# Patient Record
Sex: MALE | Race: BLACK OR AFRICAN AMERICAN | URBAN - METROPOLITAN AREA
[De-identification: names, ages, dates, MRNs, and addresses within clinical notes are randomized per-mention and may not be internally consistent; named-entity substitution may affect disease eponyms.]

---

## 2017-01-03 ENCOUNTER — OFFICE VISIT (OUTPATIENT)
Dept: PRIMARY CARE CLINIC | Age: 47
End: 2017-01-03

## 2017-01-03 VITALS
BODY MASS INDEX: 31.8 KG/M2 | WEIGHT: 228 LBS | HEART RATE: 92 BPM | SYSTOLIC BLOOD PRESSURE: 136 MMHG | DIASTOLIC BLOOD PRESSURE: 86 MMHG | OXYGEN SATURATION: 93 % | TEMPERATURE: 99.5 F

## 2017-01-03 DIAGNOSIS — E11.9 TYPE 2 DIABETES MELLITUS WITHOUT COMPLICATION, WITHOUT LONG-TERM CURRENT USE OF INSULIN (HCC): Chronic | ICD-10-CM

## 2017-01-03 DIAGNOSIS — R03.0 ELEVATED BLOOD PRESSURE (NOT HYPERTENSION): Chronic | ICD-10-CM

## 2017-01-03 DIAGNOSIS — N52.9 ERECTILE DYSFUNCTION, UNSPECIFIED ERECTILE DYSFUNCTION TYPE: ICD-10-CM

## 2017-01-03 DIAGNOSIS — E11.9 TYPE 2 DIABETES MELLITUS WITHOUT COMPLICATION, WITH LONG-TERM CURRENT USE OF INSULIN (HCC): ICD-10-CM

## 2017-01-03 DIAGNOSIS — Z79.4 TYPE 2 DIABETES MELLITUS WITHOUT COMPLICATION, WITH LONG-TERM CURRENT USE OF INSULIN (HCC): ICD-10-CM

## 2017-01-03 LAB
A/G RATIO: 1.5 (ref 1.1–2.2)
ALBUMIN SERPL-MCNC: 4.7 G/DL (ref 3.4–5)
ALP BLD-CCNC: 49 U/L (ref 40–129)
ALT SERPL-CCNC: 61 U/L (ref 10–40)
ANION GAP SERPL CALCULATED.3IONS-SCNC: 21 MMOL/L (ref 3–16)
AST SERPL-CCNC: 28 U/L (ref 15–37)
BASOPHILS ABSOLUTE: 0 K/UL (ref 0–0.2)
BASOPHILS RELATIVE PERCENT: 0.3 %
BILIRUB SERPL-MCNC: <0.2 MG/DL (ref 0–1)
BUN BLDV-MCNC: 12 MG/DL (ref 7–20)
CALCIUM SERPL-MCNC: 9.8 MG/DL (ref 8.3–10.6)
CHLORIDE BLD-SCNC: 99 MMOL/L (ref 99–110)
CHOLESTEROL, TOTAL: 218 MG/DL (ref 0–199)
CO2: 21 MMOL/L (ref 21–32)
CREAT SERPL-MCNC: 0.9 MG/DL (ref 0.9–1.3)
CREATININE URINE: 113 MG/DL (ref 39–259)
EOSINOPHILS ABSOLUTE: 0 K/UL (ref 0–0.6)
EOSINOPHILS RELATIVE PERCENT: 0.5 %
GFR AFRICAN AMERICAN: >60
GFR NON-AFRICAN AMERICAN: >60
GLOBULIN: 3.2 G/DL
GLUCOSE BLD-MCNC: 192 MG/DL (ref 70–99)
HCT VFR BLD CALC: 44.4 % (ref 40.5–52.5)
HDLC SERPL-MCNC: 50 MG/DL (ref 40–60)
HEMOGLOBIN: 14.3 G/DL (ref 13.5–17.5)
LDL CHOLESTEROL CALCULATED: 121 MG/DL
LYMPHOCYTES ABSOLUTE: 2.5 K/UL (ref 1–5.1)
LYMPHOCYTES RELATIVE PERCENT: 35.6 %
MCH RBC QN AUTO: 27 PG (ref 26–34)
MCHC RBC AUTO-ENTMCNC: 32.3 G/DL (ref 31–36)
MCV RBC AUTO: 83.5 FL (ref 80–100)
MICROALBUMIN UR-MCNC: 19.2 MG/DL
MICROALBUMIN/CREAT UR-RTO: 169.9 MG/G (ref 0–30)
MONOCYTES ABSOLUTE: 0.3 K/UL (ref 0–1.3)
MONOCYTES RELATIVE PERCENT: 4.7 %
NEUTROPHILS ABSOLUTE: 4.2 K/UL (ref 1.7–7.7)
NEUTROPHILS RELATIVE PERCENT: 58.9 %
PDW BLD-RTO: 13.8 % (ref 12.4–15.4)
PLATELET # BLD: 311 K/UL (ref 135–450)
PMV BLD AUTO: 8.9 FL (ref 5–10.5)
POTASSIUM SERPL-SCNC: 4.9 MMOL/L (ref 3.5–5.1)
RBC # BLD: 5.31 M/UL (ref 4.2–5.9)
SODIUM BLD-SCNC: 141 MMOL/L (ref 136–145)
TOTAL PROTEIN: 7.9 G/DL (ref 6.4–8.2)
TRIGL SERPL-MCNC: 236 MG/DL (ref 0–150)
TSH SERPL DL<=0.05 MIU/L-ACNC: 2.28 UIU/ML (ref 0.27–4.2)
VLDLC SERPL CALC-MCNC: 47 MG/DL
WBC # BLD: 7.1 K/UL (ref 4–11)

## 2017-01-03 PROCEDURE — 99214 OFFICE O/P EST MOD 30 MIN: CPT | Performed by: INTERNAL MEDICINE

## 2017-01-03 RX ORDER — LOVASTATIN 40 MG/1
40 TABLET ORAL DAILY
Qty: 30 TABLET | Refills: 6 | Status: SHIPPED | OUTPATIENT
Start: 2017-01-03 | End: 2017-09-12 | Stop reason: SDUPTHER

## 2017-01-03 RX ORDER — OLMESARTAN MEDOXOMIL 20 MG/1
20 TABLET ORAL DAILY
Qty: 30 TABLET | Refills: 6 | Status: SHIPPED | OUTPATIENT
Start: 2017-01-03 | End: 2017-09-12 | Stop reason: SDUPTHER

## 2017-01-03 RX ORDER — SILDENAFIL 100 MG/1
100 TABLET, FILM COATED ORAL PRN
Qty: 10 TABLET | Refills: 6 | Status: SHIPPED | OUTPATIENT
Start: 2017-01-03 | End: 2017-07-13 | Stop reason: SDUPTHER

## 2017-01-03 ASSESSMENT — PATIENT HEALTH QUESTIONNAIRE - PHQ9
2. FEELING DOWN, DEPRESSED OR HOPELESS: 0
1. LITTLE INTEREST OR PLEASURE IN DOING THINGS: 0
SUM OF ALL RESPONSES TO PHQ9 QUESTIONS 1 & 2: 0
SUM OF ALL RESPONSES TO PHQ QUESTIONS 1-9: 0

## 2017-01-03 ASSESSMENT — ENCOUNTER SYMPTOMS
EYE ITCHING: 0
BLURRED VISION: 0
EYE DISCHARGE: 0
GASTROINTESTINAL NEGATIVE: 1
EYE PAIN: 0
ORTHOPNEA: 0
SHORTNESS OF BREATH: 0
PHOTOPHOBIA: 0
RESPIRATORY NEGATIVE: 1
EYE REDNESS: 0

## 2017-01-04 LAB
ESTIMATED AVERAGE GLUCOSE: 214.5 MG/DL
HBA1C MFR BLD: 9.1 %

## 2017-03-24 ENCOUNTER — OFFICE VISIT (OUTPATIENT)
Dept: PRIMARY CARE CLINIC | Age: 47
End: 2017-03-24

## 2017-03-24 ENCOUNTER — TELEPHONE (OUTPATIENT)
Dept: SURGERY | Age: 47
End: 2017-03-24

## 2017-03-24 VITALS
BODY MASS INDEX: 31.94 KG/M2 | OXYGEN SATURATION: 100 % | WEIGHT: 229 LBS | TEMPERATURE: 97.9 F | DIASTOLIC BLOOD PRESSURE: 74 MMHG | HEART RATE: 86 BPM | SYSTOLIC BLOOD PRESSURE: 132 MMHG | RESPIRATION RATE: 18 BRPM

## 2017-03-24 DIAGNOSIS — L72.3 SEBACEOUS CYST: Primary | ICD-10-CM

## 2017-03-24 DIAGNOSIS — R03.0 ELEVATED BLOOD PRESSURE (NOT HYPERTENSION): Chronic | ICD-10-CM

## 2017-03-24 DIAGNOSIS — E11.9 TYPE 2 DIABETES MELLITUS WITHOUT COMPLICATION, WITHOUT LONG-TERM CURRENT USE OF INSULIN (HCC): Chronic | ICD-10-CM

## 2017-03-24 DIAGNOSIS — E78.5 HYPERLIPIDEMIA, UNSPECIFIED HYPERLIPIDEMIA TYPE: Chronic | ICD-10-CM

## 2017-03-24 PROCEDURE — 99214 OFFICE O/P EST MOD 30 MIN: CPT | Performed by: INTERNAL MEDICINE

## 2017-03-24 ASSESSMENT — ENCOUNTER SYMPTOMS
SORE THROAT: 0
VOMITING: 0
EYE DISCHARGE: 0
EYE REDNESS: 0
CHANGE IN BOWEL HABIT: 0
ABDOMINAL PAIN: 0
GASTROINTESTINAL NEGATIVE: 1
RESPIRATORY NEGATIVE: 1
EYE ITCHING: 0
COUGH: 0
NAUSEA: 0
SWOLLEN GLANDS: 0
PHOTOPHOBIA: 0
EYE PAIN: 0
VISUAL CHANGE: 0

## 2017-03-24 ASSESSMENT — PATIENT HEALTH QUESTIONNAIRE - PHQ9
2. FEELING DOWN, DEPRESSED OR HOPELESS: 0
SUM OF ALL RESPONSES TO PHQ QUESTIONS 1-9: 0
1. LITTLE INTEREST OR PLEASURE IN DOING THINGS: 0
SUM OF ALL RESPONSES TO PHQ9 QUESTIONS 1 & 2: 0

## 2017-03-28 ENCOUNTER — OFFICE VISIT (OUTPATIENT)
Dept: SURGERY | Age: 47
End: 2017-03-28

## 2017-03-28 VITALS
HEART RATE: 66 BPM | SYSTOLIC BLOOD PRESSURE: 112 MMHG | DIASTOLIC BLOOD PRESSURE: 78 MMHG | BODY MASS INDEX: 32.2 KG/M2 | WEIGHT: 230 LBS | HEIGHT: 71 IN

## 2017-03-28 DIAGNOSIS — L72.3 SEBACEOUS CYST: Primary | ICD-10-CM

## 2017-03-28 PROCEDURE — 11402 EXC TR-EXT B9+MARG 1.1-2 CM: CPT | Performed by: SURGERY

## 2017-07-13 DIAGNOSIS — N52.9 ERECTILE DYSFUNCTION, UNSPECIFIED ERECTILE DYSFUNCTION TYPE: ICD-10-CM

## 2017-07-14 RX ORDER — SILDENAFIL CITRATE 100 MG
TABLET ORAL
Qty: 10 TABLET | Refills: 0 | Status: SHIPPED | OUTPATIENT
Start: 2017-07-14 | End: 2017-08-18 | Stop reason: SDUPTHER

## 2017-08-18 DIAGNOSIS — N52.9 ERECTILE DYSFUNCTION, UNSPECIFIED ERECTILE DYSFUNCTION TYPE: ICD-10-CM

## 2017-08-22 RX ORDER — SILDENAFIL CITRATE 100 MG
TABLET ORAL
Qty: 10 TABLET | Refills: 0 | Status: SHIPPED | OUTPATIENT
Start: 2017-08-22 | End: 2017-09-12 | Stop reason: SDUPTHER

## 2017-09-12 ENCOUNTER — OFFICE VISIT (OUTPATIENT)
Dept: PRIMARY CARE CLINIC | Age: 47
End: 2017-09-12

## 2017-09-12 VITALS
BODY MASS INDEX: 30.8 KG/M2 | HEART RATE: 89 BPM | DIASTOLIC BLOOD PRESSURE: 81 MMHG | RESPIRATION RATE: 18 BRPM | WEIGHT: 220 LBS | SYSTOLIC BLOOD PRESSURE: 115 MMHG | TEMPERATURE: 98.2 F | OXYGEN SATURATION: 95 % | HEIGHT: 71 IN

## 2017-09-12 DIAGNOSIS — E11.9 TYPE 2 DIABETES MELLITUS WITHOUT COMPLICATION, WITHOUT LONG-TERM CURRENT USE OF INSULIN (HCC): Chronic | ICD-10-CM

## 2017-09-12 DIAGNOSIS — E11.9 TYPE 2 DIABETES MELLITUS WITHOUT COMPLICATION, WITH LONG-TERM CURRENT USE OF INSULIN (HCC): ICD-10-CM

## 2017-09-12 DIAGNOSIS — E11.8 TYPE 2 DIABETES MELLITUS WITH COMPLICATION, WITHOUT LONG-TERM CURRENT USE OF INSULIN (HCC): ICD-10-CM

## 2017-09-12 DIAGNOSIS — Z79.4 TYPE 2 DIABETES MELLITUS WITHOUT COMPLICATION, WITH LONG-TERM CURRENT USE OF INSULIN (HCC): ICD-10-CM

## 2017-09-12 DIAGNOSIS — N52.9 ERECTILE DYSFUNCTION, UNSPECIFIED ERECTILE DYSFUNCTION TYPE: ICD-10-CM

## 2017-09-12 LAB
GLUCOSE BLD-MCNC: 226 MG/DL
HBA1C MFR BLD: 14 %

## 2017-09-12 PROCEDURE — 82962 GLUCOSE BLOOD TEST: CPT | Performed by: INTERNAL MEDICINE

## 2017-09-12 PROCEDURE — 83036 HEMOGLOBIN GLYCOSYLATED A1C: CPT | Performed by: INTERNAL MEDICINE

## 2017-09-12 PROCEDURE — 99214 OFFICE O/P EST MOD 30 MIN: CPT | Performed by: INTERNAL MEDICINE

## 2017-09-12 RX ORDER — LOVASTATIN 40 MG/1
40 TABLET ORAL DAILY
Qty: 30 TABLET | Refills: 6 | Status: SHIPPED | OUTPATIENT
Start: 2017-09-12 | End: 2018-09-10

## 2017-09-12 RX ORDER — OLMESARTAN MEDOXOMIL 20 MG/1
20 TABLET ORAL DAILY
Qty: 30 TABLET | Refills: 6 | Status: SHIPPED | OUTPATIENT
Start: 2017-09-12 | End: 2018-10-08 | Stop reason: SDUPTHER

## 2017-09-12 RX ORDER — SILDENAFIL 100 MG/1
TABLET, FILM COATED ORAL
Qty: 10 TABLET | Refills: 5 | Status: SHIPPED | OUTPATIENT
Start: 2017-09-12 | End: 2018-03-27 | Stop reason: SDUPTHER

## 2017-09-12 RX ORDER — LANCETS 30 GAUGE
EACH MISCELLANEOUS
Qty: 50 EACH | Refills: 3 | Status: SHIPPED | OUTPATIENT
Start: 2017-09-12 | End: 2022-05-09

## 2017-09-12 ASSESSMENT — ENCOUNTER SYMPTOMS
PHOTOPHOBIA: 0
RESPIRATORY NEGATIVE: 1
EYE ITCHING: 0
VISUAL CHANGE: 0
EYE PAIN: 0
EYE REDNESS: 0
BLURRED VISION: 0
EYE DISCHARGE: 0
GASTROINTESTINAL NEGATIVE: 1

## 2017-09-26 ENCOUNTER — OFFICE VISIT (OUTPATIENT)
Dept: PRIMARY CARE CLINIC | Age: 47
End: 2017-09-26

## 2017-09-26 VITALS
DIASTOLIC BLOOD PRESSURE: 78 MMHG | TEMPERATURE: 98 F | OXYGEN SATURATION: 97 % | SYSTOLIC BLOOD PRESSURE: 115 MMHG | HEART RATE: 90 BPM | WEIGHT: 224.1 LBS | HEIGHT: 71 IN | BODY MASS INDEX: 31.37 KG/M2

## 2017-09-26 DIAGNOSIS — E11.9 TYPE 2 DIABETES MELLITUS WITHOUT COMPLICATION, WITHOUT LONG-TERM CURRENT USE OF INSULIN (HCC): Primary | Chronic | ICD-10-CM

## 2017-09-26 LAB — GLUCOSE BLD-MCNC: 281 MG/DL

## 2017-09-26 PROCEDURE — 99213 OFFICE O/P EST LOW 20 MIN: CPT | Performed by: INTERNAL MEDICINE

## 2017-09-26 PROCEDURE — 82962 GLUCOSE BLOOD TEST: CPT | Performed by: INTERNAL MEDICINE

## 2017-09-26 ASSESSMENT — ENCOUNTER SYMPTOMS
EYE REDNESS: 0
GASTROINTESTINAL NEGATIVE: 1
VISUAL CHANGE: 0
PHOTOPHOBIA: 0
EYE PAIN: 0
BLURRED VISION: 0
EYE DISCHARGE: 0
EYE ITCHING: 0
RESPIRATORY NEGATIVE: 1

## 2017-10-10 ENCOUNTER — OFFICE VISIT (OUTPATIENT)
Dept: PRIMARY CARE CLINIC | Age: 47
End: 2017-10-10

## 2017-10-10 VITALS
DIASTOLIC BLOOD PRESSURE: 76 MMHG | HEIGHT: 71 IN | BODY MASS INDEX: 31.5 KG/M2 | HEART RATE: 67 BPM | SYSTOLIC BLOOD PRESSURE: 115 MMHG | WEIGHT: 225 LBS

## 2017-10-10 DIAGNOSIS — E11.9 TYPE 2 DIABETES MELLITUS WITHOUT COMPLICATION, WITHOUT LONG-TERM CURRENT USE OF INSULIN (HCC): Chronic | ICD-10-CM

## 2017-10-10 PROCEDURE — 99213 OFFICE O/P EST LOW 20 MIN: CPT | Performed by: INTERNAL MEDICINE

## 2017-10-10 ASSESSMENT — ENCOUNTER SYMPTOMS
EYE PAIN: 0
RESPIRATORY NEGATIVE: 1
VISUAL CHANGE: 0
EYE ITCHING: 0
PHOTOPHOBIA: 0
BLURRED VISION: 0
GASTROINTESTINAL NEGATIVE: 1
EYE DISCHARGE: 0
EYE REDNESS: 0

## 2017-10-10 NOTE — ASSESSMENT & PLAN NOTE
2 week follow up and has had a change in his diet and improving blood sugars. Still is over 200 in the morning before breakfast. Increase the Toujeo to 35 units.

## 2017-10-10 NOTE — PROGRESS NOTES
Maico Mendoza  YOB: 1970    Date of Service:  10/10/2017    Chief Complaint   Patient presents with    Diabetes     Diabetes in under better control on basal insulin (Toujeo) and metformin daily along with an improved diet. Diabetes   He presents for his follow-up diabetic visit. He has type 2 diabetes mellitus. No MedicAlert identification noted. His disease course has been improving. Pertinent negatives for hypoglycemia include no confusion, dizziness, headaches, hunger, mood changes, nervousness/anxiousness, pallor, seizures, sleepiness, speech difficulty, sweats or tremors. Pertinent negatives for diabetes include no blurred vision, no chest pain, no fatigue, no foot ulcerations, no polydipsia, no polyphagia, no polyuria, no visual change, no weakness and no weight loss. There are no hypoglycemic complications. Pertinent negatives for hypoglycemia complications include no blackouts. Symptoms are improving. There are no diabetic complications. Risk factors for coronary artery disease include diabetes mellitus, hypertension, male sex, obesity and stress. Current diabetic treatment includes intensive insulin program and oral agent (monotherapy). He is compliant with treatment all of the time. His weight is increasing steadily. He is following a diabetic diet. Meal planning includes ADA exchanges. He has had a previous visit with a dietitian. He participates in exercise three times a week. His home blood glucose trend is decreasing rapidly. An ACE inhibitor/angiotensin II receptor blocker is being taken. He does not see a podiatrist.Eye exam is current.        Allergies   Allergen Reactions    Food      shrimp     Outpatient Prescriptions Marked as Taking for the 10/10/17 encounter (Office Visit) with Taylor Croft MD   Medication Sig Dispense Refill    Insulin Pen Needle 31G X 8 MM MISC 1 each by Does not apply route daily 100 each 3    sildenafil (VIAGRA) 100 MG tablet TAKE ONE Exam   Constitutional: He is oriented to person, place, and time. He appears well-developed and well-nourished. No distress. HENT:   Head: Normocephalic and atraumatic. Eyes: Conjunctivae and EOM are normal. Pupils are equal, round, and reactive to light. Neck: Normal range of motion. Neck supple. Cardiovascular: Normal rate, regular rhythm and normal heart sounds. Exam reveals no gallop and no friction rub. No murmur heard. Pulmonary/Chest: Effort normal and breath sounds normal.   Musculoskeletal: Normal range of motion. He exhibits no edema, tenderness or deformity. Neurological: He is alert and oriented to person, place, and time. Skin: Skin is warm and dry. He is not diaphoretic. Psychiatric: He has a normal mood and affect. His behavior is normal. Judgment and thought content normal.       Lab Review   Office Visit on 09/26/2017   Component Date Value    Glucose 09/26/2017 281    Office Visit on 09/12/2017   Component Date Value    Glucose 09/12/2017 226     Hemoglobin A1C 09/12/2017 14          Health Maintenance   Topic Date Due    Diabetic retinal exam  01/28/2017    DTaP/Tdap/Td vaccine (1 - Tdap) 03/23/2018 (Originally 1/2/1988)    Flu vaccine (1) 03/23/2018 (Originally 9/1/2017)    HIV screen  03/23/2018 (Originally 1/2/1984)    Pneumococcal med risk (1 of 1 - PPSV23) 09/13/2018 (Originally 1/2/1988)    Diabetic hemoglobin A1C test  12/12/2017    Diabetic microalbuminuria test  01/03/2018    Lipid screen  01/03/2018    Diabetic foot exam  03/24/2018          Assessment/Plan:    Diabetes mellitus ([address]  2 week follow up and has had a change in his diet and improving blood sugars. Still is over 200 in the morning before breakfast. Increase the Toujeo to 35 units. 1. Type 2 diabetes mellitus without complication, without long-term current use of insulin (HCC)    - Insulin Pen Needle 31G X 8 MM MISC; 1 each by Does not apply route daily  Dispense: 100 each;  Refill: 3

## 2017-10-10 NOTE — PATIENT INSTRUCTIONS
Increase the Toujeo to 35 units each day. If your morning blood sugars continue to be over 200, let me know before your next visit in a month and I will increase it to 40 units. Check your blood sugars each day as you are doing and bring in your diary to review on your next visit. Patient Education        Learning About Meal Planning for Diabetes  Why plan your meals? Meal planning can be a key part of managing diabetes. Planning meals and snacks with the right balance of carbohydrate, protein, and fat can help you keep your blood sugar at the target level you set with your doctor. You don't have to eat special foods. You can eat what your family eats, including sweets once in a while. But you do have to pay attention to how often you eat and how much you eat of certain foods. You may want to work with a dietitian or a certified diabetes educator. He or she can give you tips and meal ideas and can answer your questions about meal planning. This health professional can also help you reach a healthy weight if that is one of your goals. What plan is right for you? Your dietitian or diabetes educator may suggest that you start with the plate format or carbohydrate counting. The plate format  The plate format is a simple way to help you manage how you eat. You plan meals by learning how much space each food should take on a plate. Using the plate format helps you spread carbohydrate throughout the day. It can make it easier to keep your blood sugar level within your target range. It also helps you see if you're eating healthy portion sizes. To use the plate format, you put non-starchy vegetables on half your plate. Add meat or meat substitutes on one-quarter of the plate. Put a grain or starchy vegetable (such as brown rice or a potato) on the final quarter of the plate.  You can add a small piece of fruit and some low-fat or fat-free milk or yogurt, depending on your carbohydrate goal for each meal.  Here are [email or web address] and sign in to your [employer] account. Enter W867 in the [facility] box to learn more about \"Learning About Meal Planning for Diabetes. \"     If you do not have an account, please click on the \"Sign Up Now\" link. Current as of: March 13, 2017  Content Version: 11.3  © 1285-7943 [employer]. Care instructions adapted under license by [facility] ([facility]). If you have questions about a medical condition or this instruction, always ask your healthcare professional. [place] [address] any warranty or liability for your use of this information. Patient Education        Learning About Diabetes Food Guidelines  Your Care Instructions  Meal planning is important to manage diabetes. It helps keep your blood sugar at a target level (which you set with your doctor). You don't have to eat special foods. You can eat what your family eats, including sweets once in a while. But you do have to pay attention to how often you eat and how much you eat of certain foods. You may want to work with a dietitian or a certified diabetes educator (CDE) to help you plan meals and snacks. A dietitian or CDE can also help you lose weight if that is one of your goals. What should you know about eating carbs? Managing the amount of carbohydrate (carbs) you eat is an important part of healthy meals when you have diabetes. Carbohydrate is found in many foods. · Learn which foods have carbs. And learn the amounts of carbs in different foods. ¨ Bread, cereal, pasta, and rice have about 15 grams of carbs in a serving. A serving is 1 slice of bread (1 ounce), ½ cup of cooked cereal, or 1/3 cup of cooked pasta or rice. ¨ Fruits have 15 grams of carbs in a serving. A serving is 1 small fresh fruit, such as an apple or orange; ½ of a banana; ½ cup of cooked or canned fruit; ½ cup of fruit juice; 1 cup of melon or raspberries; or 2 tablespoons of dried fruit.   ¨ Milk and no-sugar-added yogurt have 15 grams of carbs in a serving. A serving is 1 cup of milk or 2/3 cup of no-sugar-added yogurt. ¨ Starchy vegetables have 15 grams of carbs in a serving. A serving is ½ cup of mashed potatoes or sweet potato; 1 cup winter squash; ½ of a small baked potato; ½ cup of cooked beans; or ½ cup cooked corn or green peas. · Learn how much carbs to eat each day and at each meal. A dietitian or CDE can teach you how to keep track of the amount of carbs you eat. This is called carbohydrate counting. · If you are not sure how to count carbohydrate grams, use the Plate Method to plan meals. It is a good, quick way to make sure that you have a balanced meal. It also helps you spread carbs throughout the day. ¨ Divide your plate by types of foods. Put non-starchy vegetables on half the plate, meat or other protein food on one-quarter of the plate, and a grain or starchy vegetable in the final quarter of the plate. To this you can add a small piece of fruit and 1 cup of milk or yogurt, depending on how many carbs you are supposed to eat at a meal.  · Try to eat about the same amount of carbs at each meal. Do not \"save up\" your daily allowance of carbs to eat at one meal.  · Proteins have very little or no carbs per serving. Examples of proteins are beef, chicken, turkey, fish, eggs, tofu, cheese, cottage cheese, and peanut butter. A serving size of meat is 3 ounces, which is about the size of a deck of cards. Examples of meat substitute serving sizes (equal to 1 ounce of meat) are 1/4 cup of cottage cheese, 1 egg, 1 tablespoon of peanut butter, and ½ cup of tofu. How can you eat out and still eat healthy? · Learn to estimate the serving sizes of foods that have carbohydrate. If you measure food at home, it will be easier to estimate the amount in a serving of restaurant food.   · If the meal you order has too much carbohydrate (such as potatoes, corn, or baked beans), ask to have a low-carbohydrate Instructions    You don't have to eat special foods when you take insulin. You just have to be careful to eat healthy foods. And you have to spread throughout the day the carbohydrates you eat. Carbohydrates raise blood sugar higher and more quickly than any other nutrient. It is found in desserts, breads and cereals, and fruit. It's also found in starchy vegetables such as potatoes and corn, grains such as rice and pasta, and milk and yogurt. The more carbohydrates, or carbs, you eat at one time, the higher your blood sugar will rise. Spreading carbs throughout the day helps keep your blood sugar levels within your target range. Counting carbs is one of the best ways to keep your blood sugar under control when you use insulin. It helps you match the right amount of insulin to the number of grams of carbohydrates in a meal. You need to test your blood sugar several times a day to learn how carbs affect you. Then you can change your diet and insulin dose as needed. A registered dietitian or certified diabetes educator can help you plan meals and snacks. Follow-up care is a key part of your treatment and safety. Be sure to make and go to all appointments, and call your doctor if you are having problems. It's also a good idea to know your test results and keep a list of the medicines you take. How can you care for yourself at home? Know your daily amount of carbohydrates  Your daily amount depends on several things, including your weight, how active you are, which diabetes medicines you take, and what your goals are for your blood sugar levels. A registered dietitian or certified diabetes educator can help you plan how many carbohydrates to include in each meal and snack. For most adults, a guideline for the daily amount of carbohydrates is:  · 45 to 60 grams at each meal. That's about the same as 3 to 4 carbohydrate servings. · 15 to 20 grams at each snack.  That's about the same as 1 carbohydrate serving. Count carbs  If you take insulin, you need to know how many grams of carbohydrates are in a meal. This lets you know how much rapid-acting insulin to take before you eat. If you use an insulin pump, you get a constant rate of insulin during the day. So the pump must be programmed at meals to give you extra insulin to cover the rise in blood sugar after meals. When you know how many carbohydrates you will eat, you can take the right amount of insulin. Or, if you always use the same amount of insulin, you need to make sure that you eat the same amount of carbs at meals. · Learn your own insulin-to-carbohydrates ratio. You and your diabetes health professional will figure out the ratio. You can do this by testing your blood sugar after meals. For example, you may need a certain amount of insulin for every 15 grams of carbohydrates. · Add up the carbohydrate grams in a meal. Then you can figure out how many units of insulin to take based on your insulin-to-carbohydrates ratio. · Look at labels on packaged foods. This can tell you how many carbohydrates are in a serving. You can also use guides from the American Diabetes Association. · Be aware of portions, or serving sizes. If a package has two servings and you eat the whole package, you need to double the number of grams of carbohydrates listed for one serving. · Protein, fat, and fiber do not raise blood sugar as much as carbs do. If you eat a lot of these nutrients in a meal, your blood sugar will rise more slowly than it would otherwise. · Exercise lowers blood sugar. You can use less insulin than you would if you were not doing exercise. Keep in mind that timing matters. If you exercise within 1 hour after a meal, your body may need less insulin for that meal than it would if you exercised 3 hours after the meal. Test your blood sugar to find out how exercise affects your need for insulin.   Eat from all food groups  · Eat at least three meals a drink.  ¨ Check your blood sugar more often. This is because alcohol can lower your blood sugar too much. This may happen even hours later while you sleep. You may want to eat and adjust your insulin dose when you drink alcohol to prevent severe low blood sugar. ¨ Talk to your doctor. Alcohol may not be recommended when you are taking certain diabetes medicines. Where can you learn more? Go to https://ScanCafepepiceweb.[employer]. org and sign in to your [employer] account. Enter I912 in the [place] box to learn more about \"Counting Carbohydrates When You Take Insulin: Care Instructions. \"     If you do not have an account, please click on the \"Sign Up Now\" link. Current as of: March 13, 2017  Content Version: 11.3  © 9295-7613 [employer]. Care instructions adapted under license by [facility] ([facility]). If you have questions about a medical condition or this instruction, always ask your healthcare professional. [place] [address] any warranty or liability for your use of this information.

## 2017-11-01 ENCOUNTER — TELEPHONE (OUTPATIENT)
Dept: PRIMARY CARE CLINIC | Age: 47
End: 2017-11-01

## 2017-11-01 DIAGNOSIS — E11.9 TYPE 2 DIABETES MELLITUS WITHOUT COMPLICATION, WITHOUT LONG-TERM CURRENT USE OF INSULIN (HCC): Primary | Chronic | ICD-10-CM

## 2017-11-01 RX ORDER — INSULIN GLARGINE 300 U/ML
25 INJECTION, SOLUTION SUBCUTANEOUS NIGHTLY
Qty: 1.5 ML | Refills: 5 | Status: SHIPPED | OUTPATIENT
Start: 2017-11-01 | End: 2018-07-05 | Stop reason: SDUPTHER

## 2017-11-01 NOTE — TELEPHONE ENCOUNTER
Sent to the pharmacy. If there are any problems with this (and there always are) please ask someone else in the office to take care of it [facility] with samples. I am out of town until next week. Thanks.

## 2017-12-06 ENCOUNTER — OFFICE VISIT (OUTPATIENT)
Dept: PRIMARY CARE CLINIC | Age: 47
End: 2017-12-06

## 2017-12-06 VITALS
WEIGHT: 232 LBS | RESPIRATION RATE: 18 BRPM | OXYGEN SATURATION: 96 % | BODY MASS INDEX: 32.36 KG/M2 | TEMPERATURE: 98 F | HEART RATE: 86 BPM | DIASTOLIC BLOOD PRESSURE: 80 MMHG | SYSTOLIC BLOOD PRESSURE: 134 MMHG

## 2017-12-06 DIAGNOSIS — E11.9 TYPE 2 DIABETES MELLITUS WITHOUT COMPLICATION, WITHOUT LONG-TERM CURRENT USE OF INSULIN (HCC): Chronic | ICD-10-CM

## 2017-12-06 LAB — HBA1C MFR BLD: 10.8 %

## 2017-12-06 PROCEDURE — 99213 OFFICE O/P EST LOW 20 MIN: CPT | Performed by: INTERNAL MEDICINE

## 2017-12-06 PROCEDURE — 83036 HEMOGLOBIN GLYCOSYLATED A1C: CPT | Performed by: INTERNAL MEDICINE

## 2017-12-06 ASSESSMENT — ENCOUNTER SYMPTOMS
RESPIRATORY NEGATIVE: 1
EYE DISCHARGE: 0
EYES NEGATIVE: 1

## 2017-12-06 NOTE — PATIENT INSTRUCTIONS
Continue the current insulin dose. Continue metformin. See me in 2 months. Continue your other medications.

## 2017-12-06 NOTE — PROGRESS NOTES
Dexter Mann  YOB: 1970    Date of Service:  12/6/2017    Chief Complaint   Patient presents with    1 Month Follow-Up    Diabetes     Doing well after 2 months on Toujeo and feels a lot better. Diabetes   He presents for his follow-up diabetic visit. He has type 2 diabetes mellitus. No MedicAlert identification noted. His disease course has been improving. There are no hypoglycemic associated symptoms. Pertinent negatives for hypoglycemia include no confusion or nervousness/anxiousness. There are no diabetic associated symptoms. There are no hypoglycemic complications. Symptoms are stable. There are no diabetic complications. Risk factors for coronary artery disease include hypertension, male sex, diabetes mellitus, dyslipidemia and sedentary lifestyle. Current diabetic treatment includes intensive insulin program and oral agent (monotherapy). He is compliant with treatment all of the time. His weight is increasing steadily. He is following a diabetic diet. Meal planning includes ADA exchanges. He has had a previous visit with a dietitian. He participates in exercise three times a week. His home blood glucose trend is decreasing rapidly. His breakfast blood glucose range is generally 180-200 mg/dl. His lunch blood glucose range is generally 140-180 mg/dl. His dinner blood glucose range is generally 140-180 mg/dl. His highest blood glucose is 140-180 mg/dl. His overall blood glucose range is 140-180 mg/dl. An ACE inhibitor/angiotensin II receptor blocker is being taken. He does not see a podiatrist.Eye exam is current.        Allergies   Allergen Reactions    Food      shrimp     Outpatient Prescriptions Marked as Taking for the 12/6/17 encounter (Office Visit) with Adam Robledo MD   Medication Sig Dispense Refill    TOUJEO SOLOSTAR 300 UNIT/ML injection pen Inject 25 Units into the skin nightly 1.5 mL 5    Insulin Pen Needle 31G X 8 MM MISC 1 each by Does not apply route daily 100 each 3    sildenafil (VIAGRA) 100 MG tablet TAKE ONE TABLET BY MOUTH AS NEEDED FOR ERECTILE DYSFUNCTION 10 tablet 5    metFORMIN (GLUCOPHAGE) 500 MG tablet TAKE ONE TABLET BY MOUTH TWICE DAILY WITH MEALS 60 tablet 5    Lancets MISC Test once daily 50 each 3    olmesartan (BENICAR) 20 MG tablet Take 1 tablet by mouth daily 30 tablet 6         There is no immunization history on file for this patient. Past Medical History:   Diagnosis Date    Diabetes mellitus ([address]     ED (erectile dysfunction) 1/31/2013    Hyperlipidemia LDL goal < 100 1/31/2013     No past surgical history on file. Family History   Problem Relation Age of Onset    Diabetes Mother     Diabetes Father     Diabetes Sister     Diabetes Brother        Review of Systems:  Review of Systems   Constitutional: Negative for activity change and appetite change. HENT: Negative. Eyes: Negative. Negative for discharge. Respiratory: Negative. Genitourinary: Negative for difficulty urinating. Musculoskeletal: Negative for arthralgias. Skin: Negative for rash. Neurological: Negative. Psychiatric/Behavioral: Negative. Negative for agitation and confusion. The patient is not nervous/anxious. All other systems reviewed and are negative. Vitals:    12/06/17 1619   BP: 134/80   Site: Left Arm   Position: Sitting   Cuff Size: Large Adult   Pulse: 86   Resp: 18   Temp: 98 °F (36.7 °C)   TempSrc: Oral   SpO2: 96%   Weight: 232 lb (105.2 kg)     Body mass index is 32.36 kg/m². Wt Readings from Last 3 Encounters:   12/06/17 232 lb (105.2 kg)   10/10/17 225 lb (102.1 kg)   09/26/17 224 lb 1.6 oz (101.7 kg)     BP Readings from Last 3 Encounters:   12/06/17 134/80   10/10/17 115/76   09/26/17 115/78         Physical Exam   Constitutional: He is oriented to person, place, and time. He appears well-developed and well-nourished. HENT:   Head: Normocephalic and atraumatic.    Eyes: Conjunctivae and EOM are normal. Pupils are equal, round, and reactive to light. Neck: Normal range of motion. Neck supple. Cardiovascular: Normal rate, regular rhythm and normal heart sounds. Pulmonary/Chest: Effort normal and breath sounds normal.   Musculoskeletal: Normal range of motion. Neurological: He is alert and oriented to person, place, and time. Skin: Skin is warm and dry. Psychiatric: He has a normal mood and affect. His behavior is normal. Thought content normal.       Lab Review   Office Visit on 09/26/2017   Component Date Value    Glucose 09/26/2017 281    Office Visit on 09/12/2017   Component Date Value    Glucose 09/12/2017 226     Hemoglobin A1C 09/12/2017 14          Health Maintenance   Topic Date Due    Diabetic retinal exam  01/28/2017    Diabetic hemoglobin A1C test  12/12/2017    DTaP/Tdap/Td vaccine (1 - Tdap) 03/23/2018 (Originally 1/2/1988)    Flu vaccine (1) 03/23/2018 (Originally 9/1/2017)    HIV screen  03/23/2018 (Originally 1/2/1984)    Pneumococcal med risk (1 of 1 - PPSV23) 09/13/2018 (Originally 1/2/1988)    Diabetic microalbuminuria test  01/03/2018    Lipid screen  01/03/2018    Diabetic foot exam  03/24/2018          Assessment/Plan:    Diabetes mellitus ([address]  The patient is doing well on the 35 untis of [facility]. The diary was reviewed from the last 2 months and his blood sugar control is improving with blood sugars consistently in the mid to high one hundreds. 1. Type 2 diabetes mellitus without complication, without long-term current use of insulin ([address]         No Follow-up on file.

## 2017-12-06 NOTE — ASSESSMENT & PLAN NOTE
The patient is doing well on the 35 untis of Toujeo. The diary was reviewed from the last 2 months and his blood sugar control is improving with blood sugars consistently in the mid to high one hundreds.

## 2018-01-15 LAB — DIABETIC RETINOPATHY: NEGATIVE

## 2018-02-07 ENCOUNTER — OFFICE VISIT (OUTPATIENT)
Dept: PRIMARY CARE CLINIC | Age: 48
End: 2018-02-07

## 2018-02-07 VITALS
SYSTOLIC BLOOD PRESSURE: 118 MMHG | TEMPERATURE: 98.7 F | HEART RATE: 92 BPM | DIASTOLIC BLOOD PRESSURE: 81 MMHG | BODY MASS INDEX: 33.42 KG/M2 | WEIGHT: 239.6 LBS | OXYGEN SATURATION: 95 %

## 2018-02-07 DIAGNOSIS — E11.9 TYPE 2 DIABETES MELLITUS WITHOUT COMPLICATION, WITHOUT LONG-TERM CURRENT USE OF INSULIN (HCC): Primary | Chronic | ICD-10-CM

## 2018-02-07 LAB — HBA1C MFR BLD: 9.3 %

## 2018-02-07 PROCEDURE — 99213 OFFICE O/P EST LOW 20 MIN: CPT | Performed by: INTERNAL MEDICINE

## 2018-02-07 PROCEDURE — 83036 HEMOGLOBIN GLYCOSYLATED A1C: CPT | Performed by: INTERNAL MEDICINE

## 2018-02-07 ASSESSMENT — ENCOUNTER SYMPTOMS
EYE ITCHING: 0
EYE REDNESS: 0
RESPIRATORY NEGATIVE: 1
EYE DISCHARGE: 0
EYE PAIN: 0
PHOTOPHOBIA: 0
EYES NEGATIVE: 1
GASTROINTESTINAL NEGATIVE: 1

## 2018-02-07 NOTE — ASSESSMENT & PLAN NOTE
a1c is slowly coming down on 35 units of insulin. Weight is going up. Not controlling carbohydrates very well. Give diet instruction today in summary. See me in 3months.

## 2018-02-07 NOTE — PATIENT INSTRUCTIONS
up\" your daily allowance of carbs to eat at one meal.  · Proteins have very little or no carbs per serving. Examples of proteins are beef, chicken, turkey, fish, eggs, tofu, cheese, cottage cheese, and peanut butter. A serving size of meat is 3 ounces, which is about the size of a deck of cards. Examples of meat substitute serving sizes (equal to 1 ounce of meat) are 1/4 cup of cottage cheese, 1 egg, 1 tablespoon of peanut butter, and ½ cup of tofu. How can you eat out and still eat healthy? · Learn to estimate the serving sizes of foods that have carbohydrate. If you measure food at home, it will be easier to estimate the amount in a serving of restaurant food. · If the meal you order has too much carbohydrate (such as potatoes, corn, or baked beans), ask to have a low-carbohydrate food instead. Ask for a salad or green vegetables. · If you use insulin, check your blood sugar before and after eating out to help you plan how much to eat in the future. · If you eat more carbohydrate at a meal than you had planned, take a walk or do other exercise. This will help lower your blood sugar. What else should you know? · Limit saturated fat, such as the fat from meat and dairy products. This is a healthy choice because people who have diabetes are at higher risk of heart disease. So choose lean cuts of meat and nonfat or low-fat dairy products. Use olive or canola oil instead of butter or shortening when cooking. · Don't skip meals. Your blood sugar may drop too low if you skip meals and take insulin or certain medicines for diabetes. · Check with your doctor before you drink alcohol. Alcohol can cause your blood sugar to drop too low. Alcohol can also cause a bad reaction if you take certain diabetes medicines. Follow-up care is a key part of your treatment and safety. Be sure to make and go to all appointments, and call your doctor if you are having problems.  It's also a good idea to know your test results and Add to it as you think of more things you need. · Take the list to the store to do your weekly shopping. Follow-up care is a key part of your treatment and safety. Be sure to make and go to all appointments, and call your doctor if you are having problems. It's also a good idea to know your test results and keep a list of the medicines you take. Where can you learn more? Go to [email or web address]://[email or web address] org and sign in to your RiffTrax account. Enter K235 in the Jiglu box to learn more about \"Learning About Meal Planning for Diabetes. \"     If you do not have an account, please click on the \"Sign Up Now\" link. Current as of: March 13, 2017  Content Version: 11.5  © 3343-9010 V[employer]. Care instructions adapted under license by [facility] [place] [address] If you have questions about a medical condition or this instruction, always ask your healthcare professional. [place] [address] any warranty or liability for your use of this information. Patient Education        Counting Carbohydrates: Care Instructions  Your Care Instructions    You don't have to eat special foods when you have diabetes. You just have to be careful to eat healthy foods. Carbohydrates (carbs) raise blood sugar higher and quicker than any other nutrient. Carbs are found in desserts, breads and cereals, and fruit. They're also in starchy vegetables. These include potatoes, corn, and grains such as rice and pasta. Carbs are also in milk and yogurt. The more carbs you eat at one time, the higher your blood sugar will rise. Spreading carbs all through the day helps keep your blood sugar levels within your target range. Counting carbs is one of the best ways to keep your blood sugar under control. If you use insulin, counting carbs helps you match the right amount of insulin to the number of grams of carbs in a meal. Then you can change your diet and insulin dose as needed.  Testing your blood than it would if you exercised 3 hours after the meal. Test your blood sugar to find out how exercise affects your need for insulin. If you do or don't take insulin:  · Look at labels on packaged foods. This can tell you how many carbs are in a serving. You can also use guides from the American Diabetes Association. · Be aware of portions, or serving sizes. If a package has two servings and you eat the whole package, you need to double the number of grams of carbohydrate listed for one serving. · Protein, fat, and fiber do not raise blood sugar as much as carbs do. If you eat a lot of these nutrients in a meal, your blood sugar will rise more slowly than it would otherwise. Eat from all food groups  · Eat at least three meals a day. · Plan meals to include food from all the food groups. The food groups include grains, fruits, dairy, proteins, and vegetables. · Talk to your dietitian or diabetes educator about ways to add limited amounts of sweets into your meal plan. · If you drink alcohol, talk to your doctor. It may not be recommended when you are taking certain diabetes medicines. Where can you learn more? Go to [email or web address]://[email or web address] [email or web address].[email or web address] org and sign in to your Modumetal account. Enter D609 in the [facility] box to learn more about \"Counting Carbohydrates: Care Instructions. \"     If you do not have an account, please click on the \"Sign Up Now\" link. Current as of: March 13, 2017  Content Version: 11.5  © 6842-7037 [employer]. Care instructions adapted under license by [facility] ([facility]). If you have questions about a medical condition or this instruction, always ask your healthcare professional. [place] [address] any warranty or liability for your use of this information. Patient Education        Learning About Insulin Pens  What is an insulin pen? An insulin pen is a device for giving insulin shots. It looks like a pen.  Inside the pen is a needle and a cartridge filled with insulin. You can set the dose of insulin with a dial on the outside of the pen. You use the pen to give the insulin shot (injection). Both disposable and reusable insulin pens are available. With a disposable pen, a set amount of insulin comes in the pen ready to use. When the insulin is used up, you throw the pen away. You use a new pen the next time you need insulin. With a reusable pen, you don't throw the pen away. Instead, you reload the pen with a pre-measured cartridge of insulin. When the insulin is used up, you insert a new cartridge into the pen. Disposable and reusable pens both need a new needle with each shot. The needles come in different lengths and widths. [place] needles will prevent injecting into the muscle, especially in children or people who are lean. Thinner-width needles reduce the pricking sensation. Width is measured by gauge. The higher the number, the thinner the needle. Why do some people prefer pens? · Most people find that insulin pens are easier to use than a bottle and syringe. · Many people feel less pain (or no pain) with the smaller insulin pen needle, compared to a syringe needle. · Insulin pens may help you give yourself more accurate doses. When you draw insulin into a syringe, you must carefully measure so that you don't get too much or too little. But with a pen, you set a dial for the amount of insulin you want, and then you push the button. · Insulin pens may work better than syringes for people who don't see well or who have problems like arthritis that make it harder to use a syringe. · Using an insulin pen draws less attention from others. You can give yourself insulin with fewer people noticing. · You don't need to carry insulin bottles and syringes everywhere you go. An insulin pen fits into a pocket or purse. What should you know about insulin pens? Each pen delivers a different brand and type (or types) of insulin.  Some deliver button and push it in until it stops. Keep the pen in your skin. Hold the dose knob in for 10 seconds (or to the number that the  recommends). Then pull the needle out of your skin. Do not rub the area. 8. Put only the outer cap back over the needle. The thin inner cover is harder to put back on, and you could stick yourself. 9. After covering the needle with the outer cap, unscrew the needle and throw it away in a sharps container or other solid plastic container. You can get a sharps container at your drugstore. 10. Always read the insulin package information that tells the best way to store your insulin pen and insulin cartridges. In general, unopened insulin for pens will last longer if it is kept in the refrigerator. After insulin is opened, most manufacturers say to store it at room temperature. Don't share insulin pens with anyone else who uses insulin. Even when the needle is changed, an insulin pen can carry bacteria or blood that can make another person sick. Where can you learn more? Go to https://ECO-SAFE.[email or web address] org and sign in to your [employer] Technologies account. Enter M910 in the Twist box to learn more about \"Learning About Insulin Pens. \"     If you do not have an account, please click on the \"Sign Up Now\" link. Current as of: March 13, 2017  Content Version: 11.5  © 3027-1647 [employer]CH[employer]OLOG[employer]. Care instructions adapted under license by [facility] ([facility]). If you have questions about a medical condition or this instruction, always ask your healthcare professional. [place] [address] any warranty or liability for your use of this information. Patient Education        Learning About Metformin for Type 2 Diabetes  Introduction    Metformin (such as Glucophage) is a medicine used to treat type 2 diabetes. It helps keep blood sugar levels on target.   You may have tried to eat a healthy diet, lose weight, and get more exercise to keep your blood [email or web address] and sign in to your [employer] account. Enter J360 in the Emos [employer] box to learn more about \"Learning About Metformin for Type 2 Diabetes. \"     If you do not have an account, please click on the \"Sign Up Now\" link. Current as of: March 13, 2017  Content Version: 11.5  © 5737-2736 Healthwise, [employer]. Care instructions adapted under license by [facility] ([facility]). If you have questions about a medical condition or this instruction, always ask your healthcare professional. [place] any warranty or liability for your use of this information.

## 2018-03-21 ENCOUNTER — OFFICE VISIT (OUTPATIENT)
Dept: PRIMARY CARE CLINIC | Age: 48
End: 2018-03-21

## 2018-03-21 VITALS
DIASTOLIC BLOOD PRESSURE: 83 MMHG | BODY MASS INDEX: 33.74 KG/M2 | HEIGHT: 71 IN | TEMPERATURE: 97.8 F | WEIGHT: 241 LBS | SYSTOLIC BLOOD PRESSURE: 119 MMHG | HEART RATE: 78 BPM | OXYGEN SATURATION: 96 %

## 2018-03-21 DIAGNOSIS — L29.9 ITCHING: ICD-10-CM

## 2018-03-21 DIAGNOSIS — L02.91 ABSCESS: ICD-10-CM

## 2018-03-21 PROCEDURE — 99214 OFFICE O/P EST MOD 30 MIN: CPT | Performed by: INTERNAL MEDICINE

## 2018-03-21 ASSESSMENT — ENCOUNTER SYMPTOMS
COUGH: 0
EYE ITCHING: 0
EYE DISCHARGE: 0
PHOTOPHOBIA: 0
RESPIRATORY NEGATIVE: 1
EYE PAIN: 0
GASTROINTESTINAL NEGATIVE: 1
RHINORRHEA: 0
EYE REDNESS: 0

## 2018-03-21 NOTE — PATIENT INSTRUCTIONS
Start on the topical steroid cream up to twice a day as needed to help relieve itching. Continue to change the bandage at least once a day. Continue current medications. See me next month as previously planned.

## 2018-03-27 DIAGNOSIS — E11.9 TYPE 2 DIABETES MELLITUS WITHOUT COMPLICATION, WITHOUT LONG-TERM CURRENT USE OF INSULIN (HCC): Chronic | ICD-10-CM

## 2018-03-27 DIAGNOSIS — N52.9 ERECTILE DYSFUNCTION, UNSPECIFIED ERECTILE DYSFUNCTION TYPE: ICD-10-CM

## 2018-03-28 RX ORDER — SILDENAFIL CITRATE 100 MG
TABLET ORAL
Qty: 10 TABLET | Refills: 5 | Status: SHIPPED | OUTPATIENT
Start: 2018-03-28 | End: 2018-09-24 | Stop reason: SDUPTHER

## 2018-03-28 NOTE — TELEPHONE ENCOUNTER
Requested Prescriptions     Pending Prescriptions Disp Refills    metFORMIN (GLUCOPHAGE) 500 MG tablet [Pharmacy Med Name: METFORMIN 500MG TAB] 60 tablet 5     Sig: TAKE ONE TABLET BY MOUTH TWICE DAILY WITH MEALS    VIAGRA 100 MG tablet [Pharmacy Med Name: SILDENAFIL 100MG TAB] 10 tablet 5     Sig: TAKE ONE TABLET BY MOUTH AS NEEDED FOR ERECTILE DYSFUNCTION     LAst OV 3/21/18

## 2018-05-08 ENCOUNTER — OFFICE VISIT (OUTPATIENT)
Dept: PRIMARY CARE CLINIC | Age: 48
End: 2018-05-08

## 2018-05-08 VITALS
SYSTOLIC BLOOD PRESSURE: 113 MMHG | RESPIRATION RATE: 16 BRPM | OXYGEN SATURATION: 97 % | DIASTOLIC BLOOD PRESSURE: 80 MMHG | HEIGHT: 71 IN | BODY MASS INDEX: 32.62 KG/M2 | TEMPERATURE: 97.5 F | HEART RATE: 78 BPM | WEIGHT: 233 LBS

## 2018-05-08 DIAGNOSIS — R03.0 ELEVATED BLOOD PRESSURE READING WITHOUT DIAGNOSIS OF HYPERTENSION: Chronic | ICD-10-CM

## 2018-05-08 DIAGNOSIS — E11.9 TYPE 2 DIABETES MELLITUS WITHOUT COMPLICATION, WITHOUT LONG-TERM CURRENT USE OF INSULIN (HCC): Primary | Chronic | ICD-10-CM

## 2018-05-08 LAB — HBA1C MFR BLD: 11 %

## 2018-05-08 PROCEDURE — 83036 HEMOGLOBIN GLYCOSYLATED A1C: CPT | Performed by: INTERNAL MEDICINE

## 2018-05-08 PROCEDURE — 99214 OFFICE O/P EST MOD 30 MIN: CPT | Performed by: INTERNAL MEDICINE

## 2018-05-08 ASSESSMENT — ENCOUNTER SYMPTOMS
RESPIRATORY NEGATIVE: 1
BLURRED VISION: 0
EYES NEGATIVE: 1
EYE DISCHARGE: 0

## 2018-07-06 ENCOUNTER — TELEPHONE (OUTPATIENT)
Dept: PRIMARY CARE CLINIC | Age: 48
End: 2018-07-06

## 2018-07-06 NOTE — TELEPHONE ENCOUNTER
Pt is calling because he takes Toujeo, however he will not be able to get another refill until 7/11/18. Do we have any samples available? Pl,s advise. thank you!   Last ov: 5/8/18   Lion: [phone]

## 2018-08-15 ENCOUNTER — OFFICE VISIT (OUTPATIENT)
Dept: PRIMARY CARE CLINIC | Age: 48
End: 2018-08-15

## 2018-08-15 VITALS
RESPIRATION RATE: 18 BRPM | WEIGHT: 237 LBS | HEIGHT: 71 IN | DIASTOLIC BLOOD PRESSURE: 88 MMHG | HEART RATE: 68 BPM | BODY MASS INDEX: 33.18 KG/M2 | OXYGEN SATURATION: 96 % | SYSTOLIC BLOOD PRESSURE: 128 MMHG

## 2018-08-15 DIAGNOSIS — M25.572 ACUTE LEFT ANKLE PAIN: ICD-10-CM

## 2018-08-15 DIAGNOSIS — R03.0 ELEVATED BLOOD PRESSURE READING WITHOUT DIAGNOSIS OF HYPERTENSION: Chronic | ICD-10-CM

## 2018-08-15 DIAGNOSIS — E11.9 TYPE 2 DIABETES MELLITUS WITHOUT COMPLICATION, WITHOUT LONG-TERM CURRENT USE OF INSULIN (HCC): Primary | Chronic | ICD-10-CM

## 2018-08-15 DIAGNOSIS — E11.41 DIABETIC MONONEUROPATHY ASSOCIATED WITH TYPE 2 DIABETES MELLITUS (HCC): ICD-10-CM

## 2018-08-15 LAB — HBA1C MFR BLD: 10.9 %

## 2018-08-15 PROCEDURE — 83036 HEMOGLOBIN GLYCOSYLATED A1C: CPT | Performed by: INTERNAL MEDICINE

## 2018-08-15 PROCEDURE — 99214 OFFICE O/P EST MOD 30 MIN: CPT | Performed by: INTERNAL MEDICINE

## 2018-08-15 RX ORDER — GABAPENTIN 100 MG/1
100 CAPSULE ORAL 3 TIMES DAILY
Qty: 42 CAPSULE | Refills: 0 | Status: SHIPPED | OUTPATIENT
Start: 2018-08-15 | End: 2021-02-15 | Stop reason: ALTCHOICE

## 2018-08-15 RX ORDER — INSULIN GLARGINE 300 U/ML
INJECTION, SOLUTION SUBCUTANEOUS
Qty: 9 PEN | Refills: 5 | Status: SHIPPED | OUTPATIENT
Start: 2018-08-15 | End: 2018-12-24 | Stop reason: SDUPTHER

## 2018-08-15 ASSESSMENT — ENCOUNTER SYMPTOMS
EYE ITCHING: 0
EYES NEGATIVE: 1
RESPIRATORY NEGATIVE: 1
PHOTOPHOBIA: 0
EYE REDNESS: 0
GASTROINTESTINAL NEGATIVE: 1
EYE DISCHARGE: 0
EYE PAIN: 0

## 2018-08-15 ASSESSMENT — PATIENT HEALTH QUESTIONNAIRE - PHQ9
SUM OF ALL RESPONSES TO PHQ QUESTIONS 1-9: 0
2. FEELING DOWN, DEPRESSED OR HOPELESS: 0
SUM OF ALL RESPONSES TO PHQ QUESTIONS 1-9: 0
SUM OF ALL RESPONSES TO PHQ9 QUESTIONS 1 & 2: 0
1. LITTLE INTEREST OR PLEASURE IN DOING THINGS: 0

## 2018-08-15 NOTE — PATIENT INSTRUCTIONS
Start taking 1000 mg of Metformin twice a day with food. See the endocrinologist for further suggestions. Start on Gabapentin for the left leg pain up to 3 times a day. Xray of the left foot and ankle. See me in 3 months.

## 2018-08-15 NOTE — PROGRESS NOTES
Jessica Stauffer  YOB: 1970    Date of Service:  8/15/2018    Chief Complaint   Patient presents with    Diabetes     3 mo follow up, medications taken as prescribed, no concerns    Knee Pain     Lt knee pain, radiates down Lt leg    Leg Pain     associated with knee pain         Diabetes   He presents for his follow-up diabetic visit. He has type 2 diabetes mellitus. No MedicAlert identification noted. His disease course has been stable. There are no hypoglycemic associated symptoms. Associated symptoms include foot paresthesias. Pertinent negatives for diabetes include no fatigue. There are no hypoglycemic complications. Symptoms are worsening. There are no diabetic complications. Risk factors for coronary artery disease include hypertension, male sex, dyslipidemia and diabetes mellitus. Knee Pain      Leg Pain      Foot Pain   This is a new problem. The current episode started 1 to 4 weeks ago. The problem occurs intermittently. The problem has been unchanged. Pertinent negatives include no chills, diaphoresis, fatigue or fever. The symptoms are aggravated by standing (The pain in his left leg in mainly in the lateral left ankle ). He has tried NSAIDs for the symptoms. The treatment provided moderate relief. Allergies   Allergen Reactions    Food      shrimp     Outpatient Prescriptions Marked as Taking for the 8/15/18 encounter (Office Visit) with William Nye MD   Medication Sig Dispense Refill    TOUJEO SOLOSTAR 300 UNIT/ML injection pen INJECT 40 UNITS SUBCUTANEOUSLY AT NIGHT AS DIRECTED 9 pen 5    metFORMIN (GLUCOPHAGE) 1000 MG tablet Take 1 tablet by mouth 2 times daily (with meals) 60 tablet 5    gabapentin (NEURONTIN) 100 MG capsule Take 1 capsule by mouth 3 times daily for 14 days. . 42 capsule 0    Dulaglutide (TRULICITY) 1.5 QR/7.3JN SOPN Inject 1 pen into the skin once a week 4 pen 5    metFORMIN (GLUCOPHAGE) 500 MG tablet TAKE ONE TABLET BY MOUTH TWICE DAILY WITH MEALS 60 tablet 5    VIAGRA 100 MG tablet TAKE ONE TABLET BY MOUTH AS NEEDED FOR ERECTILE DYSFUNCTION 10 tablet 5    hydrocortisone 2.5 % cream Use twice a day as needed to relieve itching. 28 g 1    Insulin Pen Needle 31G X 8 MM MISC 1 each by Does not apply route daily 100 each 3    Lancets MISC Test once daily 50 each 3    olmesartan (BENICAR) 20 MG tablet Take 1 tablet by mouth daily 30 tablet 6         There is no immunization history on file for this patient. Past Medical History:   Diagnosis Date    Diabetes mellitus ([address]     ED (erectile dysfunction) 1/31/2013    Hyperlipidemia LDL goal < 100 1/31/2013     No past surgical history on file. Family History   Problem Relation Age of Onset    Diabetes Mother     Diabetes Father     Diabetes Sister     Diabetes Brother        Review of Systems:  Review of Systems   Constitutional: Negative for activity change, appetite change, chills, diaphoresis, fatigue, fever and unexpected weight change. HENT: Negative. Eyes: Negative. Negative for photophobia, pain, discharge, redness, itching and visual disturbance. Respiratory: Negative. Cardiovascular: Negative. Gastrointestinal: Negative. Genitourinary: Negative. Musculoskeletal: Negative. Skin: Negative. Neurological: Negative. Psychiatric/Behavioral: Negative. All other systems reviewed and are negative. Vitals:    08/15/18 1618   BP: 128/88   Site: Right Arm   Position: Sitting   Cuff Size: Medium Adult   Pulse: 68   Resp: 18   SpO2: 96%   Weight: 237 lb (107.5 kg)   Height: 5' 11\" (1.803 m)     Body mass index is 33.05 kg/m². Wt Readings from Last 3 Encounters:   08/15/18 237 lb (107.5 kg)   05/08/18 233 lb (105.7 kg)   03/21/18 241 lb (109.3 kg)     BP Readings from Last 3 Encounters:   08/15/18 128/88   05/08/18 113/80   03/21/18 119/83         Physical Exam   Constitutional: He is oriented to person, place, and time.  He appears well-developed and well-nourished. HENT:   Head: Normocephalic and atraumatic. Eyes: Pupils are equal, round, and reactive to light. Conjunctivae and EOM are normal.   Neck: Normal range of motion. Neck supple. Cardiovascular: Normal rate, regular rhythm and normal heart sounds. Pulmonary/Chest: Effort normal and breath sounds normal.   Musculoskeletal: Normal range of motion. He exhibits tenderness. The left lateral ankle is slightly tender without any significant swelling   Neurological: He is alert and oriented to person, place, and time. Skin: Skin is warm and dry. Psychiatric: He has a normal mood and affect. His behavior is normal. Judgment and thought content normal.       Lab Review   Office Visit on 05/08/2018   Component Date Value    Hemoglobin A1C 05/08/2018 11.0          Health Maintenance   Topic Date Due    HIV screen  01/02/1984    DTaP/Tdap/Td vaccine (1 - Tdap) 01/02/1988    Diabetic retinal exam  01/28/2017    Diabetic microalbuminuria test  01/03/2018    Lipid screen  01/03/2018    Potassium monitoring  01/03/2018    Creatinine monitoring  01/03/2018    Diabetic foot exam  03/24/2018    A1C test (Diabetic or Prediabetic)  08/08/2018    Pneumococcal med risk (1 of 1 - PPSV23) 09/13/2018 (Originally 1/2/1988)    Flu vaccine (1) 09/01/2018          Assessment/Plan:    Diabetes mellitus ([address]  No progress lowering A1c over the past 3 months. Will try increasing the Metformin dose. Referred to endocrinology for further suggestions. Elevated blood pressure reading without diagnosis of hypertension  Stable           1. Type 2 diabetes mellitus without complication, without long-term current use of insulin ([facility])    - POCT glycosylated hemoglobin (Hb A1C)  - TOUJEO SOLOSTAR 300 UNIT/ML injection pen; INJECT 40 UNITS SUBCUTANEOUSLY AT NIGHT AS DIRECTED  Dispense: 9 pen; Refill: 5  - Malvin Domínguez MD  - metFORMIN (GLUCOPHAGE) 1000 MG tablet;  Take 1 tablet by mouth 2 times daily (with meals)  Dispense: 60 tablet; Refill: 5    2. Diabetic mononeuropathy associated with type 2 diabetes mellitus (HCC)    - gabapentin (NEURONTIN) 100 MG capsule; Take 1 capsule by mouth 3 times daily for 14 days. .  Dispense: 42 capsule; Refill: 0    3. Acute left ankle pain    - XR FOOT LEFT (MIN 3 VIEWS); Future  - XR ANKLE LEFT (2 VIEWS); Future       Return in about 3 months (around 11/15/2018).

## 2018-08-15 NOTE — ASSESSMENT & PLAN NOTE
No progress lowering A1c over the past 3 months. Will try increasing the Metformin dose. Referred to endocrinology for further suggestions.

## 2018-09-10 ENCOUNTER — OFFICE VISIT (OUTPATIENT)
Dept: ENDOCRINOLOGY | Age: 48
End: 2018-09-10

## 2018-09-10 VITALS
OXYGEN SATURATION: 97 % | WEIGHT: 236 LBS | DIASTOLIC BLOOD PRESSURE: 83 MMHG | SYSTOLIC BLOOD PRESSURE: 121 MMHG | HEART RATE: 78 BPM | HEIGHT: 71 IN | BODY MASS INDEX: 33.04 KG/M2

## 2018-09-10 DIAGNOSIS — I10 ESSENTIAL HYPERTENSION: ICD-10-CM

## 2018-09-10 DIAGNOSIS — E78.5 DYSLIPIDEMIA ASSOCIATED WITH TYPE 2 DIABETES MELLITUS (HCC): ICD-10-CM

## 2018-09-10 DIAGNOSIS — E11.69 DYSLIPIDEMIA ASSOCIATED WITH TYPE 2 DIABETES MELLITUS (HCC): ICD-10-CM

## 2018-09-10 PROCEDURE — 99244 OFF/OP CNSLTJ NEW/EST MOD 40: CPT | Performed by: INTERNAL MEDICINE

## 2018-09-10 RX ORDER — ATORVASTATIN CALCIUM 40 MG/1
40 TABLET, FILM COATED ORAL DAILY
Qty: 90 TABLET | Refills: 2 | Status: SHIPPED | OUTPATIENT
Start: 2018-09-10 | End: 2019-11-27 | Stop reason: SDUPTHER

## 2018-09-10 ASSESSMENT — PATIENT HEALTH QUESTIONNAIRE - PHQ9
SUM OF ALL RESPONSES TO PHQ9 QUESTIONS 1 & 2: 0
SUM OF ALL RESPONSES TO PHQ QUESTIONS 1-9: 0
2. FEELING DOWN, DEPRESSED OR HOPELESS: 0
SUM OF ALL RESPONSES TO PHQ QUESTIONS 1-9: 0
1. LITTLE INTEREST OR PLEASURE IN DOING THINGS: 0

## 2018-09-10 NOTE — PROGRESS NOTES
Patient ID:   Nimisha Ansari is a 51 y.o. male    Chief Complaint:   Nimisha Ansari presents for an initial evaluation of Type 2 Diabetes Mellitus , Hyperlipidemia and hypertension. Referred by Dr. 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 Reviewed/Reported  AM: after 130-139  Lunch:  Supper:   HS:     Hypoglycemias: None     Meals Tthree dinner is bigger. Chips 2 bags per day, 90 osbaldo each. No sodas, some juice or regular t ea. Exercise:None      Denies chest pain, exertional dyspnea. Family history of CAD: sister  of CAD at age 61   Denies smoking. SRecommend low dose aspirin     The following portions of the patient's history were reviewed and updated as appropriate:       Family History   Problem Relation Age of Onset    Diabetes Mother     Diabetes Father     Diabetes Sister     Diabetes Brother          Social History     Social History    Marital status: Single     Spouse name: N/A    Number of children: N/A    Years of education: N/A     Occupational History    Not on file. Social History Main Topics    Smoking status: Never Smoker    Smokeless tobacco: Never Used    Alcohol use Yes      Comment: social    Drug use: No    Sexual activity: Yes     Other Topics Concern    Not on file     Social History Narrative    No narrative on file       Past Medical History:   Diagnosis Date    Diabetes mellitus ([address]     ED (erectile dysfunction) 2013    Essential hypertension 9/10/2018    Hyperlipidemia LDL goal < 100 2013       History reviewed. No pertinent surgical history.       Allergies   Allergen Reactions    Food      shrimp         Current Outpatient Prescriptions:    atorvastatin (LIPITOR) 40 MG tablet, Take 1 tablet by mouth daily, Disp: 90 tablet, Rfl: 2    TOUJEO SOLOSTAR 300 UNIT/ML injection pen, INJECT 40 UNITS SUBCUTANEOUSLY AT NIGHT AS DIRECTED (Patient taking differently: INJECT 30 UNITS SUBCUTANEOUSLY AT NIGHT AS DIRECTED), Disp: 9 pen, Rfl: 5    metFORMIN (GLUCOPHAGE) 1000 MG tablet, Take 1 tablet by mouth 2 times daily (with meals), Disp: 60 tablet, Rfl: 5    VIAGRA 100 MG tablet, TAKE ONE TABLET BY MOUTH AS NEEDED FOR ERECTILE DYSFUNCTION, Disp: 10 tablet, Rfl: 5    Insulin Pen Needle 31G X 8 MM MISC, 1 each by Does not apply route daily, Disp: 100 each, Rfl: 3    Lancets MISC, Test once daily, Disp: 50 each, Rfl: 3    olmesartan (BENICAR) 20 MG tablet, Take 1 tablet by mouth daily, Disp: 30 tablet, Rfl: 6    gabapentin (NEURONTIN) 100 MG capsule, Take 1 capsule by mouth 3 times daily for 14 days. ., Disp: 42 capsule, Rfl: 0      Review of Systems:    Constitutional: Negative for fever, chills, and unexpected weight change. HENT: Negative for congestion, ear pain, rhinorrhea,  sore throat and trouble swallowing. Eyes: Negative for photophobia, redness, itching. Respiratory: Negative for cough, shortness of breath and sputum. Cardiovascular: Negative for chest pain, palpitations and leg swelling. Gastrointestinal: Negative for nausea, vomiting, abdominal pain, diarrhea, constipation. Endocrine: Negative for cold intolerance, heat intolerance, polydipsia, polyphagia and polyuria. Genitourinary: Negative for dysuria, urgency, frequency, hematuria and flank pain. Musculoskeletal: Negative for myalgias, back pain, arthralgias and neck pain. Skin/Nail: Negative for rash, itching. Normal nails. Neurological: Negative for seizures, weakness, light-headedness, numbness and headaches. Hematological/ Lymph nodes: Negative for adenopathy. Does not bruise/bleed easily.    Psychiatric/Behavioral: Negative for suicidal ideas, depression, anxiety, sleep disturbance and decreased concentration. Objective:   Physical Exam:  /83 (Site: Left Upper Arm, Position: Sitting, Cuff Size: Large Adult)   Pulse 78   Ht 5' 11\" (1.803 m)   Wt 236 lb (107 kg)   SpO2 97%   BMI 32.92 kg/m²   Constitutional: Patient is oriented to person, place, and time. Patient appears well-developed and well-nourished. HENT:    Head: Normocephalic and atraumatic. Eyes: Conjunctivae and EOM are normal. Pupils are equal, round, and reactive to light. Neck: Normal range of motion. Cardiovascular: Normal rate, regular rhythm and normal heart sounds. Pulmonary/Chest: Effort normal and breath sounds normal.   Abdominal: Soft. Bowel sounds are normal.   Musculoskeletal: Normal range of motion. Neurological: Patient is alert and oriented to person, place, and time. Patient has normal reflexes. Skin: Skin is warm and dry. Psychiatric: Patient has a normal mood and affect. Patient behavior is normal.     Lab Review:    Office Visit on 09/10/2018   Component Date Value Ref Range Status    Diabetic Retinopathy 01/15/2018 Negative   Final   Office Visit on 08/15/2018   Component Date Value Ref Range Status    Hemoglobin A1C 08/15/2018 10.9  % Final   Office Visit on 05/08/2018   Component Date Value Ref Range Status    Hemoglobin A1C 05/08/2018 11.0  % Final   Office Visit on 02/07/2018   Component Date Value Ref Range Status    Hemoglobin A1C 02/07/2018 9.3  % Final   Office Visit on 12/06/2017   Component Date Value Ref Range Status    Hemoglobin A1C 12/06/2017 10.8  % Final   Office Visit on 09/26/2017   Component Date Value Ref Range Status    Glucose 09/26/2017 281  mg/dL Final   Office Visit on 09/12/2017   Component Date Value Ref Range Status    Glucose 09/12/2017 226  mg/dL Final    Hemoglobin A1C 09/12/2017 14  % Final           Assessment and Plan     Jefferson Schaumann was seen today for consultation and diabetes.     Diagnoses and all orders for this visit:    Uncontrolled type 2 diabetes mellitus with microalbuminuria, with long-term current use of insulin (HCC)  -     atorvastatin (LIPITOR) 40 MG tablet; Take 1 tablet by mouth daily    Dyslipidemia associated with type 2 diabetes mellitus (HCC)  -     atorvastatin (LIPITOR) 40 MG tablet; Take 1 tablet by mouth daily    Essential hypertension          1: Type 2 DM complicated with nephropathy   Uncontrolled A1C 10.9% Aug 15th 2018     Need to work on diet and juices, snacks     Metformin 1000MG BID   Toujeo 30 units daily at 2-4am      Will consider adding DPP Iv inhibitor     All instructions provided in written. Check Blood sugars 2-3 times per day. Log them along with insulin and send them every 2 weeks. Call for blood sugars less than 60 or more than 400. Eye exam: Last exam in Jan 2018, denies DR. Conchita Quezada Foot exam: Due March 2018   Deformity/amputation: absent  Skin lesions/pre-ulcerative calluses: absent  Edema: right- negative, left- negative  Sensory exam: Monofilament sensation: normal  Pulses: normal, Vibration (128 Hz): Intact    Renal screen: MACR 169.9 Jan 2017     TSH screen: Jan 2017     2: HTN   Controlled     3: Hyperlipidemia   LDL: 93 , HDL: 48 , TGs: 140 June 2016   Atorvastatin 40mg daily     RTC in 4 weeks with logs and then in 4-6 weeks with A1C, lipids, MACR, UA       EDUCATION:   Greater than 50% of this follow-up visit was spent in general counseling regarding   obesity, diet, exercise, importance of adherence to insulin regime, recognition and treatment of hypo and hyperglycemia,  glucose logging, proper diabetes management, diabetic complications with poor management and the importance of glycemic control in order to avoid the complications of diabetes. Risks and potential complications of diabetes were reviewed with the patient. Diabetes health maintenance plan and follow-up were discussed and understood by the patient.   We reviewed the importance of medication compliance and

## 2018-09-24 DIAGNOSIS — N52.9 ERECTILE DYSFUNCTION, UNSPECIFIED ERECTILE DYSFUNCTION TYPE: ICD-10-CM

## 2018-09-24 RX ORDER — SILDENAFIL 100 MG/1
100 TABLET, FILM COATED ORAL PRN
Qty: 10 TABLET | Refills: 1 | Status: SHIPPED | OUTPATIENT
Start: 2018-09-24 | End: 2018-12-22 | Stop reason: SDUPTHER

## 2018-09-26 ENCOUNTER — TELEPHONE (OUTPATIENT)
Dept: ENDOCRINOLOGY | Age: 48
End: 2018-09-26

## 2018-09-26 NOTE — TELEPHONE ENCOUNTER
Called Mr. Jesús Leal to discuss BS log. Had to leave a message to contact the office to discuss BS log:    No change keep medications the same.

## 2018-10-08 ENCOUNTER — OFFICE VISIT (OUTPATIENT)
Dept: ENDOCRINOLOGY | Age: 48
End: 2018-10-08
Payer: COMMERCIAL

## 2018-10-08 VITALS
HEART RATE: 60 BPM | OXYGEN SATURATION: 98 % | WEIGHT: 232 LBS | HEIGHT: 71 IN | BODY MASS INDEX: 32.48 KG/M2 | SYSTOLIC BLOOD PRESSURE: 124 MMHG | DIASTOLIC BLOOD PRESSURE: 88 MMHG

## 2018-10-08 DIAGNOSIS — E11.69 DYSLIPIDEMIA ASSOCIATED WITH TYPE 2 DIABETES MELLITUS (HCC): ICD-10-CM

## 2018-10-08 DIAGNOSIS — I10 ESSENTIAL HYPERTENSION: ICD-10-CM

## 2018-10-08 DIAGNOSIS — E78.5 DYSLIPIDEMIA ASSOCIATED WITH TYPE 2 DIABETES MELLITUS (HCC): ICD-10-CM

## 2018-10-08 DIAGNOSIS — E11.9 TYPE 2 DIABETES MELLITUS WITHOUT COMPLICATION, WITH LONG-TERM CURRENT USE OF INSULIN (HCC): ICD-10-CM

## 2018-10-08 DIAGNOSIS — Z79.4 TYPE 2 DIABETES MELLITUS WITHOUT COMPLICATION, WITH LONG-TERM CURRENT USE OF INSULIN (HCC): ICD-10-CM

## 2018-10-08 PROCEDURE — 99214 OFFICE O/P EST MOD 30 MIN: CPT | Performed by: INTERNAL MEDICINE

## 2018-10-08 RX ORDER — OLMESARTAN MEDOXOMIL 20 MG/1
20 TABLET ORAL DAILY
Qty: 30 TABLET | Refills: 6 | Status: SHIPPED | OUTPATIENT
Start: 2018-10-08 | End: 2018-12-24 | Stop reason: SDUPTHER

## 2018-10-08 NOTE — PROGRESS NOTES
Objective:   Physical Exam:  /88 (Site: Left Upper Arm, Position: Sitting, Cuff Size: Large Adult)   Pulse 60   Ht 5' 11\" (1.803 m)   Wt 232 lb (105.2 kg)   SpO2 98%   BMI 32.36 kg/m²   Constitutional: Patient is oriented to person, place, and time. Patient appears well-developed and well-nourished. HENT:    Head: Normocephalic and atraumatic. Eyes: Conjunctivae and EOM are normal. Pupils are equal, round, and reactive to light. Neck: Normal range of motion. Cardiovascular: Normal rate, regular rhythm and normal heart sounds. Pulmonary/Chest: Effort normal and breath sounds normal.   Abdominal: Soft. Bowel sounds are normal.   Musculoskeletal: Normal range of motion. Neurological: Patient is alert and oriented to person, place, and time. Patient has normal reflexes. Skin: Skin is warm and dry. Psychiatric: Patient has a normal mood and affect. Patient behavior is normal.     Lab Review:    Office Visit on 09/10/2018   Component Date Value Ref Range Status    Diabetic Retinopathy 01/15/2018 Negative   Final   Office Visit on 08/15/2018   Component Date Value Ref Range Status    Hemoglobin A1C 08/15/2018 10.9  % Final   Office Visit on 05/08/2018   Component Date Value Ref Range Status    Hemoglobin A1C 05/08/2018 11.0  % Final   Office Visit on 02/07/2018   Component Date Value Ref Range Status    Hemoglobin A1C 02/07/2018 9.3  % Final   Office Visit on 12/06/2017   Component Date Value Ref Range Status    Hemoglobin A1C 12/06/2017 10.8  % Final           Assessment and Plan     Jud Salcido was seen today for diabetes. Diagnoses and all orders for this visit:    Uncontrolled type 2 diabetes mellitus with microalbuminuria, with long-term current use of insulin (HCC)  -     Hemoglobin A1C; Future    Dyslipidemia associated with type 2 diabetes mellitus (HCC)  -     Lipid, Fasting; Future    Essential hypertension  -     Comprehensive Metabolic Panel;  Future  -

## 2018-11-19 ENCOUNTER — OFFICE VISIT (OUTPATIENT)
Dept: ENDOCRINOLOGY | Age: 48
End: 2018-11-19
Payer: COMMERCIAL

## 2018-11-19 VITALS
HEIGHT: 71 IN | BODY MASS INDEX: 32.62 KG/M2 | WEIGHT: 233 LBS | SYSTOLIC BLOOD PRESSURE: 118 MMHG | DIASTOLIC BLOOD PRESSURE: 72 MMHG | HEART RATE: 68 BPM | OXYGEN SATURATION: 98 %

## 2018-11-19 DIAGNOSIS — E11.69 DYSLIPIDEMIA ASSOCIATED WITH TYPE 2 DIABETES MELLITUS (HCC): ICD-10-CM

## 2018-11-19 DIAGNOSIS — I10 ESSENTIAL HYPERTENSION: ICD-10-CM

## 2018-11-19 DIAGNOSIS — E78.5 DYSLIPIDEMIA ASSOCIATED WITH TYPE 2 DIABETES MELLITUS (HCC): ICD-10-CM

## 2018-11-19 DIAGNOSIS — E66.01 MORBID OBESITY DUE TO EXCESS CALORIES (HCC): ICD-10-CM

## 2018-11-19 PROBLEM — L02.91 ABSCESS: Status: RESOLVED | Noted: 2018-03-21 | Resolved: 2018-11-19

## 2018-11-19 LAB
A/G RATIO: 1.6 (ref 1.1–2.2)
ALBUMIN SERPL-MCNC: 4.6 G/DL (ref 3.4–5)
ALP BLD-CCNC: 42 U/L (ref 40–129)
ALT SERPL-CCNC: 39 U/L (ref 10–40)
ANION GAP SERPL CALCULATED.3IONS-SCNC: 18 MMOL/L (ref 3–16)
AST SERPL-CCNC: 21 U/L (ref 15–37)
BILIRUB SERPL-MCNC: <0.2 MG/DL (ref 0–1)
BILIRUBIN URINE: NEGATIVE
BLOOD, URINE: NEGATIVE
BUN BLDV-MCNC: 13 MG/DL (ref 7–20)
CALCIUM SERPL-MCNC: 9.8 MG/DL (ref 8.3–10.6)
CHLORIDE BLD-SCNC: 109 MMOL/L (ref 99–110)
CHOLESTEROL, FASTING: 137 MG/DL (ref 0–199)
CLARITY: CLEAR
CO2: 23 MMOL/L (ref 21–32)
COLOR: YELLOW
CREAT SERPL-MCNC: 0.8 MG/DL (ref 0.9–1.3)
CREATININE URINE: 92.3 MG/DL (ref 39–259)
GFR AFRICAN AMERICAN: >60
GFR NON-AFRICAN AMERICAN: >60
GLOBULIN: 2.9 G/DL
GLUCOSE BLD-MCNC: 121 MG/DL (ref 70–99)
GLUCOSE URINE: NEGATIVE MG/DL
HDLC SERPL-MCNC: 42 MG/DL (ref 40–60)
KETONES, URINE: NEGATIVE MG/DL
LDL CHOLESTEROL CALCULATED: 69 MG/DL
LEUKOCYTE ESTERASE, URINE: NEGATIVE
MICROALBUMIN UR-MCNC: <1.2 MG/DL
MICROALBUMIN/CREAT UR-RTO: NORMAL MG/G (ref 0–30)
MICROSCOPIC EXAMINATION: NORMAL
NITRITE, URINE: NEGATIVE
PH UA: 5
POTASSIUM SERPL-SCNC: 4.6 MMOL/L (ref 3.5–5.1)
PROTEIN UA: NEGATIVE MG/DL
SODIUM BLD-SCNC: 150 MMOL/L (ref 136–145)
SPECIFIC GRAVITY UA: 1.01
TOTAL PROTEIN: 7.5 G/DL (ref 6.4–8.2)
TRIGLYCERIDE, FASTING: 128 MG/DL (ref 0–150)
URINE REFLEX TO CULTURE: NORMAL
URINE TYPE: NORMAL
UROBILINOGEN, URINE: 0.2 E.U./DL
VLDLC SERPL CALC-MCNC: 26 MG/DL

## 2018-11-19 PROCEDURE — 99214 OFFICE O/P EST MOD 30 MIN: CPT | Performed by: INTERNAL MEDICINE

## 2018-11-19 NOTE — PATIENT INSTRUCTIONS
rapid-acting insulin to take before you eat. If you use an insulin pump, you get a constant rate of insulin during the day. So the pump must be programmed at meals to give you extra insulin to cover the rise in blood sugar after meals. When you know how much carbohydrate you will eat, you can take the right amount of insulin. Or, if you always use the same amount of insulin, you need to make sure that you eat the same amount of carbohydrate at meals. If you need more help to understand carbohydrate counting and food labels, ask your doctor, dietitian, or diabetes educator. How do you get started with meal planning? Here are some tips to get started:  · Plan your meals a week at a time. Don't forget to include snacks too. · Use cookbooks or online recipes to plan several main meals. Plan some quick meals for busy nights. You also can double some recipes that freeze well. Then you can save half for other busy nights when you don't have time to cook. · Make sure you have the ingredients you need for your recipes. If you're running low on basic items, put these items on your shopping list too. · List foods that you use to make breakfasts, lunches, and snacks. List plenty of fruits and vegetables. · Post this list on the refrigerator. Add to it as you think of more things you need. · Take the list to the store to do your weekly shopping. Follow-up care is a key part of your treatment and safety. Be sure to make and go to all appointments, and call your doctor if you are having problems. It's also a good idea to know your test results and keep a list of the medicines you take. Where can you learn more? Go to [email or web address]://[email or web address] org and sign in to your [employer] account. Enter Z711 in the [employer] box to learn more about \"Learning About Meal Planning for Diabetes. \"     If you do not have an account, please click on the \"Sign Up Now\" link.   Current as of: December 7, 2017  Content Version: 11.8  © 1403-0243 [employer]. Care instructions adapted under license by [facility] ([facility]). If you have questions about a medical condition or this instruction, always ask your healthcare professional. [address] any warranty or liability for your use of this information.

## 2018-11-20 ENCOUNTER — TELEPHONE (OUTPATIENT)
Dept: ENDOCRINOLOGY | Age: 48
End: 2018-11-20

## 2018-11-20 LAB
ESTIMATED AVERAGE GLUCOSE: 203 MG/DL
HBA1C MFR BLD: 8.7 %

## 2018-11-26 ENCOUNTER — TELEPHONE (OUTPATIENT)
Dept: ENDOCRINOLOGY | Age: 48
End: 2018-11-26

## 2018-11-26 NOTE — TELEPHONE ENCOUNTER
Mr. Spencer Lynn informed  NATALIO [facility] has reviewed BS log and explained numbers look good! Keep working on diet and exercise.

## 2018-12-22 DIAGNOSIS — N52.9 ERECTILE DYSFUNCTION, UNSPECIFIED ERECTILE DYSFUNCTION TYPE: ICD-10-CM

## 2018-12-24 DIAGNOSIS — Z79.4 TYPE 2 DIABETES MELLITUS WITHOUT COMPLICATION, WITH LONG-TERM CURRENT USE OF INSULIN (HCC): ICD-10-CM

## 2018-12-24 DIAGNOSIS — E11.9 TYPE 2 DIABETES MELLITUS WITHOUT COMPLICATION, WITHOUT LONG-TERM CURRENT USE OF INSULIN (HCC): Chronic | ICD-10-CM

## 2018-12-24 DIAGNOSIS — N52.9 ERECTILE DYSFUNCTION, UNSPECIFIED ERECTILE DYSFUNCTION TYPE: ICD-10-CM

## 2018-12-24 DIAGNOSIS — E11.9 TYPE 2 DIABETES MELLITUS WITHOUT COMPLICATION, WITH LONG-TERM CURRENT USE OF INSULIN (HCC): ICD-10-CM

## 2018-12-24 RX ORDER — INSULIN GLARGINE 300 U/ML
INJECTION, SOLUTION SUBCUTANEOUS
Qty: 9 PEN | Refills: 5 | Status: SHIPPED | OUTPATIENT
Start: 2018-12-24 | End: 2019-05-13 | Stop reason: SDUPTHER

## 2018-12-24 RX ORDER — SILDENAFIL 100 MG/1
100 TABLET, FILM COATED ORAL PRN
Qty: 10 TABLET | Refills: 1 | Status: SHIPPED | OUTPATIENT
Start: 2018-12-24 | End: 2018-12-24 | Stop reason: SDUPTHER

## 2018-12-24 RX ORDER — OLMESARTAN MEDOXOMIL 20 MG/1
20 TABLET ORAL DAILY
Qty: 30 TABLET | Refills: 6 | Status: SHIPPED | OUTPATIENT
Start: 2018-12-24 | End: 2019-02-18

## 2018-12-24 RX ORDER — SILDENAFIL 100 MG/1
100 TABLET, FILM COATED ORAL PRN
Qty: 10 TABLET | Refills: 1 | Status: SHIPPED | OUTPATIENT
Start: 2018-12-24 | End: 2019-04-14 | Stop reason: SDUPTHER

## 2019-02-09 LAB
ESTIMATED AVERAGE GLUCOSE: 211.6 MG/DL
HBA1C MFR BLD: 9 %

## 2019-02-18 ENCOUNTER — OFFICE VISIT (OUTPATIENT)
Dept: ENDOCRINOLOGY | Age: 49
End: 2019-02-18
Payer: COMMERCIAL

## 2019-02-18 VITALS
HEIGHT: 71 IN | BODY MASS INDEX: 32.68 KG/M2 | SYSTOLIC BLOOD PRESSURE: 126 MMHG | WEIGHT: 233.4 LBS | DIASTOLIC BLOOD PRESSURE: 87 MMHG | HEART RATE: 86 BPM | OXYGEN SATURATION: 97 %

## 2019-02-18 DIAGNOSIS — E11.69 DYSLIPIDEMIA ASSOCIATED WITH TYPE 2 DIABETES MELLITUS (HCC): ICD-10-CM

## 2019-02-18 DIAGNOSIS — E66.01 MORBID OBESITY DUE TO EXCESS CALORIES (HCC): ICD-10-CM

## 2019-02-18 DIAGNOSIS — E78.5 DYSLIPIDEMIA ASSOCIATED WITH TYPE 2 DIABETES MELLITUS (HCC): ICD-10-CM

## 2019-02-18 PROCEDURE — 99214 OFFICE O/P EST MOD 30 MIN: CPT | Performed by: INTERNAL MEDICINE

## 2019-02-18 RX ORDER — SILDENAFIL 100 MG/1
TABLET, FILM COATED ORAL
COMMUNITY
Start: 2019-02-17 | End: 2019-04-14 | Stop reason: SDUPTHER

## 2019-02-20 ENCOUNTER — TELEPHONE (OUTPATIENT)
Dept: PRIMARY CARE CLINIC | Age: 49
End: 2019-02-20

## 2019-04-10 DIAGNOSIS — E11.9 TYPE 2 DIABETES MELLITUS WITHOUT COMPLICATION, WITHOUT LONG-TERM CURRENT USE OF INSULIN (HCC): Chronic | ICD-10-CM

## 2019-04-12 DIAGNOSIS — N52.9 ERECTILE DYSFUNCTION, UNSPECIFIED ERECTILE DYSFUNCTION TYPE: ICD-10-CM

## 2019-04-14 RX ORDER — SILDENAFIL 100 MG/1
100 TABLET, FILM COATED ORAL PRN
Qty: 10 TABLET | Refills: 0 | Status: SHIPPED | OUTPATIENT
Start: 2019-04-14 | End: 2019-09-10 | Stop reason: SDUPTHER

## 2019-05-07 LAB
ESTIMATED AVERAGE GLUCOSE: 171.4 MG/DL
HBA1C MFR BLD: 7.6 %

## 2019-05-13 ENCOUNTER — OFFICE VISIT (OUTPATIENT)
Dept: ENDOCRINOLOGY | Age: 49
End: 2019-05-13
Payer: COMMERCIAL

## 2019-05-13 VITALS
SYSTOLIC BLOOD PRESSURE: 114 MMHG | WEIGHT: 223 LBS | HEIGHT: 71 IN | BODY MASS INDEX: 31.22 KG/M2 | OXYGEN SATURATION: 96 % | HEART RATE: 78 BPM | DIASTOLIC BLOOD PRESSURE: 81 MMHG

## 2019-05-13 DIAGNOSIS — E78.5 DYSLIPIDEMIA ASSOCIATED WITH TYPE 2 DIABETES MELLITUS (HCC): ICD-10-CM

## 2019-05-13 DIAGNOSIS — E66.01 MORBID OBESITY DUE TO EXCESS CALORIES (HCC): ICD-10-CM

## 2019-05-13 DIAGNOSIS — E11.69 DYSLIPIDEMIA ASSOCIATED WITH TYPE 2 DIABETES MELLITUS (HCC): ICD-10-CM

## 2019-05-13 DIAGNOSIS — I10 ESSENTIAL HYPERTENSION: ICD-10-CM

## 2019-05-13 PROCEDURE — 99214 OFFICE O/P EST MOD 30 MIN: CPT | Performed by: INTERNAL MEDICINE

## 2019-05-13 RX ORDER — INSULIN GLARGINE 300 U/ML
INJECTION, SOLUTION SUBCUTANEOUS
Qty: 9 PEN | Refills: 1 | Status: SHIPPED | OUTPATIENT
Start: 2019-05-13 | End: 2020-01-13 | Stop reason: SDUPTHER

## 2019-05-13 NOTE — PROGRESS NOTES
Patient ID:   Nedra Sarmiento is a 47 y.o. male    Chief Complaint:   Nedra Sarmiento presents for an evaluation of Type 2 Diabetes Mellitus , Hyperlipidemia and hypertension. Subjective:   Type 2 Diabetes Mellitus diagnosed in   On insulin since 9210   Trulicity caused severe GI side effects   Invokana caused urinary issues      Metformin 1000MG BID   Toujeo 24 units daily at [employer] in [facility]  Working on diet and will start working out     Checks blood sugars 2-3 times per day. Reviewed    AM:   Lunch:   Supper:    HS:     Hypoglycemias: None     Meals Three dinner is bigger. Chips 2 bags per day, 90 osbaldo each. No sodas, some juice or regular tea. Exercise:None      Denies chest pain, exertional dyspnea.      Family history of CAD: sister  of CAD at age 61   Denies smoking   Recommend low dose aspirin     The following portions of the patient's history were reviewed and updated as appropriate:       Family History   Problem Relation Age of Onset    Diabetes Mother     Diabetes Father     Diabetes Sister     Diabetes Brother          Social History     Socioeconomic History    Marital status: Single     Spouse name: Not on file    Number of children: Not on file    Years of education: Not on file    Highest education level: Not on file   Occupational History    Not on file   Social Needs    Financial resource strain: Not on file    Food insecurity:     Worry: Not on file     Inability: Not on file    Transportation needs:     Medical: Not on file     Non-medical: Not on file   Tobacco Use    Smoking status: Never Smoker    Smokeless tobacco: Never Used   Substance and Sexual Activity    Alcohol use: Yes     Comment: social    Drug use: No    Sexual activity: Yes   Lifestyle    Physical activity:     Days per week: Not on file     Minutes per session: Not on file    Stress: Not on file   Relationships    Social connections: Talks on phone: Not on file     Gets together: Not on file     Attends Muslim service: Not on file     Active member of club or organization: Not on file     Attends meetings of clubs or organizations: Not on file     Relationship status: Not on file    Intimate partner violence:     Fear of current or ex partner: Not on file     Emotionally abused: Not on file     Physically abused: Not on file     Forced sexual activity: Not on file   Other Topics Concern    Not on file   Social History Narrative    Not on file       Past Medical History:   Diagnosis Date    Diabetes mellitus ([address]    [facility] ED (erectile dysfunction) 1/31/2013    Essential hypertension 9/10/2018    Hyperlipidemia LDL goal < 100 1/31/2013    Morbid obesity due to excess calories ([address] 11/19/2018       History reviewed. No pertinent surgical history. Allergies   Allergen Reactions    Food      shrimp         Current Outpatient Medications:     TOUJEO SOLOSTAR 300 UNIT/ML injection pen, INJECT 24 UNITS SUBCUTANEOUSLY IN AM, Disp: 9 pen, Rfl: 1    sildenafil (VIAGRA) 100 MG tablet, Take 1 tablet by mouth as needed for Erectile Dysfunction, Disp: 10 tablet, Rfl: 0    metFORMIN (GLUCOPHAGE) 1000 MG tablet, TAKE 1 TABLET BY MOUTH TWICE DAILY WITH MEALS, Disp: 180 tablet, Rfl: 1    aspirin 81 MG tablet, Take 81 mg by mouth daily, Disp: , Rfl:     atorvastatin (LIPITOR) 40 MG tablet, Take 1 tablet by mouth daily, Disp: 90 tablet, Rfl: 2    Insulin Pen Needle 31G X 8 MM MISC, 1 each by Does not apply route daily, Disp: 100 each, Rfl: 3    Lancets MISC, Test once daily, Disp: 50 each, Rfl: 3    gabapentin (NEURONTIN) 100 MG capsule, Take 1 capsule by mouth 3 times daily for 14 days. ., Disp: 42 capsule, Rfl: 0      Review of Systems:    Constitutional: Negative for fever, chills, and unexpected weight change. HENT: Negative for congestion, ear pain, rhinorrhea,  sore throat and trouble swallowing.    Eyes: Negative for photophobia, redness, itching. Respiratory: Negative for cough, shortness of breath and sputum. Cardiovascular: Negative for chest pain, palpitations and leg swelling. Gastrointestinal: Negative for nausea, vomiting, abdominal pain, diarrhea, constipation. Endocrine: Negative for cold intolerance, heat intolerance, polydipsia, polyphagia and polyuria. Genitourinary: Negative for dysuria, urgency, frequency, hematuria and flank pain. Musculoskeletal: Negative for myalgias, back pain, arthralgias and neck pain. Skin/Nail: Negative for rash, itching. Normal nails. Neurological: Negative for seizures, weakness, light-headedness, numbness and headaches. Hematological/ Lymph nodes: Negative for adenopathy. Does not bruise/bleed easily. Psychiatric/Behavioral: Negative for suicidal ideas, depression, anxiety, sleep disturbance and decreased concentration. Objective:   Physical Exam:  /81 (Site: Left Upper Arm, Position: Sitting, Cuff Size: Large Adult)   Pulse 78   Ht 5' 11\" (1.803 m)   Wt 223 lb (101.2 kg)   SpO2 96%   BMI 31.10 kg/m²   Constitutional: Patient is oriented to person, place, and time. Patient appears well-developed and well-nourished. HENT:    Head: Normocephalic and atraumatic. Eyes: Conjunctivae and EOM are normal. Pupils are equal, round, and reactive to light. Neck: Normal range of motion. Cardiovascular: Normal rate, regular rhythm and normal heart sounds. Pulmonary/Chest: Effort normal and breath sounds normal.   Abdominal: Soft. Bowel sounds are normal.   Musculoskeletal: Normal range of motion. Neurological: Patient is alert and oriented to person, place, and time. Patient has normal reflexes. Skin: Skin is warm and dry. Psychiatric: Patient has a normal mood and affect.  Patient behavior is normal.     Lab Review:    Orders Only on 05/06/2019   Component Date Value Ref Range Status    Hemoglobin A1C 05/06/2019 7.6  See comment % Final    eAG 05/06/2019 171.4  mg/dL Final   Orders Only on 02/08/2019   Component Date Value Ref Range Status    Hemoglobin A1C 02/08/2019 9.0  See comment % Final    eAG 02/08/2019 211.6  mg/dL Final   Orders Only on 11/19/2018   Component Date Value Ref Range Status    Hemoglobin A1C 11/19/2018 8.7  See comment % Final    eAG 11/19/2018 203.0  mg/dL Final    Cholesterol, Fasting 11/19/2018 137  0 - 199 mg/dL Final    Triglyceride, Fasting 11/19/2018 128  0 - 150 mg/dL Final    HDL 11/19/2018 42  40 - 60 mg/dL Final    LDL Calculated 11/19/2018 69  <100 mg/dL Final    VLDL Cholesterol Calculated 11/19/2018 26  Not Established mg/dL Final    Sodium 11/19/2018 150* 136 - 145 mmol/L Final    Potassium 11/19/2018 4.6  3.5 - 5.1 mmol/L Final    Chloride 11/19/2018 109  99 - 110 mmol/L Final    CO2 11/19/2018 23  21 - 32 mmol/L Final    Anion Gap 11/19/2018 18* 3 - 16 Final    Glucose 11/19/2018 121* 70 - 99 mg/dL Final    BUN 11/19/2018 13  7 - 20 mg/dL Final    CREATININE 11/19/2018 0.8* 0.9 - 1.3 mg/dL Final    GFR Non- 11/19/2018 >60  >60 Final    GFR  11/19/2018 >60  >60 Final    Calcium 11/19/2018 9.8  8.3 - 10.6 mg/dL Final    Total Protein 11/19/2018 7.5  6.4 - 8.2 g/dL Final    Alb 11/19/2018 4.6  3.4 - 5.0 g/dL Final    Albumin/Globulin Ratio 11/19/2018 1.6  1.1 - 2.2 Final    Total Bilirubin 11/19/2018 <0.2  0.0 - 1.0 mg/dL Final    Alkaline Phosphatase 11/19/2018 42  40 - 129 U/L Final    ALT 11/19/2018 39  10 - 40 U/L Final    AST 11/19/2018 21  15 - 37 U/L Final    Globulin 11/19/2018 2.9  g/dL Final    Microalbumin, Random Urine 11/19/2018 <1.20  <2.0 mg/dL Final    Creatinine, Ur 11/19/2018 92.3  39.0 - 259.0 mg/dL Final    Microalbumin Creatinine Ratio 11/19/2018 see below  0.0 - 30.0 mg/g Final    Color, UA 11/19/2018 YELLOW  Straw/Yellow Final    Clarity, UA 11/19/2018 Clear  Clear Final    Glucose, Ur 11/19/2018 Negative  Negative mg/dL Final   [place] Bilirubin Urine 11/19/2018 Negative  Negative Final    Ketones, Urine 11/19/2018 Negative  Negative mg/dL Final    Specific [place], UA 11/19/2018 1.015  1.005 - 1.030 Final    Blood, Urine 11/19/2018 Negative  Negative Final    pH, UA 11/19/2018 5.0  5.0 - 8.0 Final    Protein, UA 11/19/2018 Negative  Negative mg/dL Final    Urobilinogen, Urine 11/19/2018 0.2  <2.0 E.U./dL Final    Nitrite, Urine 11/19/2018 Negative  Negative Final    Leukocyte Esterase, Urine 11/19/2018 Negative  Negative Final    Microscopic Examination 11/19/2018 Not Indicated   Final    Urine Reflex to Culture 11/19/2018 Not Indicated   Final    Urine Type 11/19/2018 Voided   Final   Office Visit on 09/10/2018   Component Date Value Ref Range Status    Diabetic Retinopathy 01/15/2018 Negative   Final   Office Visit on 08/15/2018   Component Date Value Ref Range Status    Hemoglobin A1C 08/15/2018 10.9  % Final           Assessment and Plan     Rosmery Najera was seen today for diabetes. Diagnoses and all orders for this visit:    Uncontrolled type 2 diabetes mellitus with microalbuminuria, with long-term current use of insulin ([facility])  -     Hemoglobin A1C; Future  -     TOUJEO SOLOSTAR 300 UNIT/ML injection pen; INJECT 24 UNITS SUBCUTANEOUSLY IN AM    Dyslipidemia associated with type 2 diabetes mellitus ([address]    Essential hypertension    Morbid obesity due to excess calories ([address]          1: Type 2 DM complicated with nephropathy   Uncontrolled A1C 7.6% May 2019 << 9% < 8.9 %< 10.9%      Blood sugars improved on diet   Will start doing strength exercises and bicycling   He can cut down insulin to 20 units when start working out    Metformin 1000MG BID   Toujeo 24 units daily at 6am   Discussed Treat to target of  in am. He agreed with plan. All instructions provided in written. Check Blood sugars 2-3 times per day. Log them along with insulin and send them every 2 weeks.  Call for blood sugars less than 60 or more than 400.     Eye exam: Last exam in Jan 2018, denies DR. Lord Gamez Foot exam: Due Aug 2019   Deformity/amputation: absent  Skin lesions/pre-ulcerative calluses: absent  Edema: right- negative, left- negative  Sensory exam: Monofilament sensation: normal  Pulses: normal, Vibration (128 Hz): Intact    Renal screen: Normal Nov 2018  TSH screen: Jan 2017     2: HTN   Controlled with out meds     3: Hyperlipidemia   LDL: 69 , HDL: 42, TGs: 128 Nov 2018    Atorvastatin 40mg daily     4: Morbid obesity   Need to work on diet, exercise and lose weight     RTC in 4 months A1C    EDUCATION:   Greater than 50% of this follow-up visit was spent in general counseling regarding   obesity, diet, exercise, importance of adherence to insulin regime, recognition and treatment of hypo and hyperglycemia,  glucose logging, proper diabetes management, diabetic complications with poor management and the importance of glycemic control in order to avoid the complications of diabetes. Risks and potential complications of diabetes were reviewed with the patient. Diabetes health maintenance plan and follow-up were discussed and understood by the patient. We reviewed the importance of medication compliance and regular follow-up. Aggressive lifestyle modification was encouraged. Exercise Counselling: This patient is a candidate for regular physical exercise. Instructions to perform the following types of exercise:  Swimming or water aerobic exercise  Brisk walking  Playing tennis  Stationary bicycle or elliptical indoor  Low impact aerobic exercise    Instructions given to exercise for the following duration:  30 minutes a day for five-seven days per week.     Following instructions for being active throughout the day in addition to formal exercise:  Walk instead of drive whenever possible  Take the stairs instead of the elevator  Work in the garden  Park to the far end of the parking lot to add more walking steps to destination      Electronically signed by Kait Estrella MD on 5/13/2019 at 12:55 PM

## 2019-05-13 NOTE — PATIENT INSTRUCTIONS

## 2019-05-31 DIAGNOSIS — N52.9 ERECTILE DYSFUNCTION, UNSPECIFIED ERECTILE DYSFUNCTION TYPE: ICD-10-CM

## 2019-06-04 RX ORDER — SILDENAFIL 100 MG/1
100 TABLET, FILM COATED ORAL PRN
Qty: 10 TABLET | Refills: 1 | Status: SHIPPED | OUTPATIENT
Start: 2019-06-04 | End: 2019-09-03 | Stop reason: SDUPTHER

## 2019-09-03 DIAGNOSIS — N52.9 ERECTILE DYSFUNCTION, UNSPECIFIED ERECTILE DYSFUNCTION TYPE: ICD-10-CM

## 2019-09-04 RX ORDER — SILDENAFIL 100 MG/1
100 TABLET, FILM COATED ORAL PRN
Qty: 10 TABLET | Refills: 1 | Status: SHIPPED | OUTPATIENT
Start: 2019-09-04 | End: 2019-11-05 | Stop reason: SDUPTHER

## 2019-09-10 ENCOUNTER — TELEPHONE (OUTPATIENT)
Dept: PRIMARY CARE CLINIC | Age: 49
End: 2019-09-10

## 2019-09-10 DIAGNOSIS — N52.9 ERECTILE DYSFUNCTION, UNSPECIFIED ERECTILE DYSFUNCTION TYPE: ICD-10-CM

## 2019-09-10 LAB
ESTIMATED AVERAGE GLUCOSE: 177.2 MG/DL
HBA1C MFR BLD: 7.8 %

## 2019-09-10 RX ORDER — SILDENAFIL 100 MG/1
100 TABLET, FILM COATED ORAL PRN
Qty: 10 TABLET | Refills: 0 | Status: SHIPPED | OUTPATIENT
Start: 2019-09-10 | End: 2019-09-16

## 2019-09-11 ENCOUNTER — TELEPHONE (OUTPATIENT)
Dept: ENDOCRINOLOGY | Age: 49
End: 2019-09-11

## 2019-09-16 ENCOUNTER — OFFICE VISIT (OUTPATIENT)
Dept: ENDOCRINOLOGY | Age: 49
End: 2019-09-16
Payer: COMMERCIAL

## 2019-09-16 VITALS
BODY MASS INDEX: 31.22 KG/M2 | WEIGHT: 223 LBS | HEART RATE: 86 BPM | DIASTOLIC BLOOD PRESSURE: 82 MMHG | HEIGHT: 71 IN | OXYGEN SATURATION: 98 % | SYSTOLIC BLOOD PRESSURE: 118 MMHG

## 2019-09-16 DIAGNOSIS — E66.01 MORBID OBESITY DUE TO EXCESS CALORIES (HCC): ICD-10-CM

## 2019-09-16 DIAGNOSIS — E11.69 DYSLIPIDEMIA ASSOCIATED WITH TYPE 2 DIABETES MELLITUS (HCC): ICD-10-CM

## 2019-09-16 DIAGNOSIS — I10 ESSENTIAL HYPERTENSION: ICD-10-CM

## 2019-09-16 DIAGNOSIS — E78.5 DYSLIPIDEMIA ASSOCIATED WITH TYPE 2 DIABETES MELLITUS (HCC): ICD-10-CM

## 2019-09-16 PROCEDURE — 99214 OFFICE O/P EST MOD 30 MIN: CPT | Performed by: INTERNAL MEDICINE

## 2019-09-16 NOTE — PROGRESS NOTES
lifestyle modification was encouraged. Exercise Counselling: This patient is a candidate for regular physical exercise. Instructions to perform the following types of exercise:  Swimming or water aerobic exercise  Brisk walking  Playing tennis  Stationary bicycle or elliptical indoor  Low impact aerobic exercise    Instructions given to exercise for the following duration:  30 minutes a day for five-seven days per week.     Following instructions for being active throughout the day in addition to formal exercise:  Walk instead of drive whenever possible  Take the stairs instead of the elevator  Work in the garden  Park to the far end of the parking lot to add more walking steps to destination      Electronically signed by Kristal Melendez MD on 9/16/2019 at 1:04 PM

## 2019-11-05 ENCOUNTER — TELEPHONE (OUTPATIENT)
Dept: PRIMARY CARE CLINIC | Age: 49
End: 2019-11-05

## 2019-11-05 DIAGNOSIS — N52.9 ERECTILE DYSFUNCTION, UNSPECIFIED ERECTILE DYSFUNCTION TYPE: ICD-10-CM

## 2019-11-05 RX ORDER — SILDENAFIL 100 MG/1
100 TABLET, FILM COATED ORAL PRN
Qty: 10 TABLET | Refills: 1 | Status: SHIPPED | OUTPATIENT
Start: 2019-11-05 | End: 2021-10-14

## 2019-11-27 DIAGNOSIS — E11.69 DYSLIPIDEMIA ASSOCIATED WITH TYPE 2 DIABETES MELLITUS (HCC): ICD-10-CM

## 2019-11-27 DIAGNOSIS — E78.5 DYSLIPIDEMIA ASSOCIATED WITH TYPE 2 DIABETES MELLITUS (HCC): ICD-10-CM

## 2019-11-27 RX ORDER — ATORVASTATIN CALCIUM 40 MG/1
TABLET, FILM COATED ORAL
Qty: 90 TABLET | Refills: 2 | Status: SHIPPED | OUTPATIENT
Start: 2019-11-27 | End: 2020-04-13 | Stop reason: SDUPTHER

## 2019-12-16 DIAGNOSIS — E11.9 TYPE 2 DIABETES MELLITUS WITHOUT COMPLICATION, WITHOUT LONG-TERM CURRENT USE OF INSULIN (HCC): Chronic | ICD-10-CM

## 2019-12-30 DIAGNOSIS — N52.9 ERECTILE DYSFUNCTION, UNSPECIFIED ERECTILE DYSFUNCTION TYPE: ICD-10-CM

## 2019-12-30 RX ORDER — SILDENAFIL 100 MG/1
100 TABLET, FILM COATED ORAL PRN
Qty: 10 TABLET | Refills: 1 | OUTPATIENT
Start: 2019-12-30 | End: 2020-01-29

## 2020-01-08 ENCOUNTER — OFFICE VISIT (OUTPATIENT)
Dept: PRIMARY CARE CLINIC | Age: 50
End: 2020-01-08
Payer: COMMERCIAL

## 2020-01-08 VITALS
OXYGEN SATURATION: 98 % | HEART RATE: 72 BPM | WEIGHT: 228 LBS | BODY MASS INDEX: 31.92 KG/M2 | SYSTOLIC BLOOD PRESSURE: 126 MMHG | TEMPERATURE: 98.2 F | DIASTOLIC BLOOD PRESSURE: 88 MMHG | HEIGHT: 71 IN

## 2020-01-08 DIAGNOSIS — E66.01 MORBID OBESITY DUE TO EXCESS CALORIES (HCC): ICD-10-CM

## 2020-01-08 DIAGNOSIS — E11.69 DYSLIPIDEMIA ASSOCIATED WITH TYPE 2 DIABETES MELLITUS (HCC): ICD-10-CM

## 2020-01-08 DIAGNOSIS — E78.5 DYSLIPIDEMIA ASSOCIATED WITH TYPE 2 DIABETES MELLITUS (HCC): ICD-10-CM

## 2020-01-08 DIAGNOSIS — I10 ESSENTIAL HYPERTENSION: ICD-10-CM

## 2020-01-08 DIAGNOSIS — R10.9 LEFT FLANK PAIN: ICD-10-CM

## 2020-01-08 LAB
A/G RATIO: 1.8 (ref 1.1–2.2)
ALBUMIN SERPL-MCNC: 4.8 G/DL (ref 3.4–5)
ALP BLD-CCNC: 45 U/L (ref 40–129)
ALT SERPL-CCNC: 68 U/L (ref 10–40)
ANION GAP SERPL CALCULATED.3IONS-SCNC: 16 MMOL/L (ref 3–16)
AST SERPL-CCNC: 50 U/L (ref 15–37)
BILIRUB SERPL-MCNC: 0.4 MG/DL (ref 0–1)
BILIRUBIN URINE: NEGATIVE
BLOOD, URINE: NEGATIVE
BUN BLDV-MCNC: 8 MG/DL (ref 7–20)
CALCIUM SERPL-MCNC: 9.8 MG/DL (ref 8.3–10.6)
CHLORIDE BLD-SCNC: 102 MMOL/L (ref 99–110)
CHOLESTEROL, FASTING: 108 MG/DL (ref 0–199)
CLARITY: CLEAR
CO2: 24 MMOL/L (ref 21–32)
COLOR: YELLOW
CREAT SERPL-MCNC: 0.7 MG/DL (ref 0.9–1.3)
CREATININE URINE: 156.8 MG/DL (ref 39–259)
EPITHELIAL CELLS, UA: 0 /HPF (ref 0–5)
GFR AFRICAN AMERICAN: >60
GFR NON-AFRICAN AMERICAN: >60
GLOBULIN: 2.7 G/DL
GLUCOSE BLD-MCNC: 158 MG/DL (ref 70–99)
GLUCOSE URINE: NEGATIVE MG/DL
HDLC SERPL-MCNC: 43 MG/DL (ref 40–60)
HYALINE CASTS: 1 /LPF (ref 0–8)
KETONES, URINE: NEGATIVE MG/DL
LDL CHOLESTEROL CALCULATED: 41 MG/DL
LEUKOCYTE ESTERASE, URINE: NEGATIVE
MICROALBUMIN UR-MCNC: 13.9 MG/DL
MICROALBUMIN/CREAT UR-RTO: 88.6 MG/G (ref 0–30)
MICROSCOPIC EXAMINATION: YES
NITRITE, URINE: NEGATIVE
PH UA: 5.5 (ref 5–8)
POTASSIUM SERPL-SCNC: 4.7 MMOL/L (ref 3.5–5.1)
PROTEIN UA: 30 MG/DL
RBC UA: 1 /HPF (ref 0–4)
SODIUM BLD-SCNC: 142 MMOL/L (ref 136–145)
SPECIFIC GRAVITY UA: 1.02 (ref 1–1.03)
TOTAL PROTEIN: 7.5 G/DL (ref 6.4–8.2)
TRIGLYCERIDE, FASTING: 119 MG/DL (ref 0–150)
URINE TYPE: ABNORMAL
UROBILINOGEN, URINE: 0.2 E.U./DL
VLDLC SERPL CALC-MCNC: 24 MG/DL
WBC UA: 1 /HPF (ref 0–5)

## 2020-01-08 PROCEDURE — 99214 OFFICE O/P EST MOD 30 MIN: CPT | Performed by: INTERNAL MEDICINE

## 2020-01-08 RX ORDER — SILDENAFIL 100 MG/1
TABLET, FILM COATED ORAL
Qty: 10 TABLET | Refills: 5 | Status: SHIPPED | OUTPATIENT
Start: 2020-01-08 | End: 2020-06-03 | Stop reason: SDUPTHER

## 2020-01-08 ASSESSMENT — PATIENT HEALTH QUESTIONNAIRE - PHQ9
SUM OF ALL RESPONSES TO PHQ QUESTIONS 1-9: 0
SUM OF ALL RESPONSES TO PHQ QUESTIONS 1-9: 0
2. FEELING DOWN, DEPRESSED OR HOPELESS: 0
SUM OF ALL RESPONSES TO PHQ9 QUESTIONS 1 & 2: 0
1. LITTLE INTEREST OR PLEASURE IN DOING THINGS: 0

## 2020-01-09 ENCOUNTER — TELEPHONE (OUTPATIENT)
Dept: PRIMARY CARE CLINIC | Age: 50
End: 2020-01-09

## 2020-01-09 LAB
ESTIMATED AVERAGE GLUCOSE: 194.4 MG/DL
HBA1C MFR BLD: 8.4 %

## 2020-01-09 ASSESSMENT — ENCOUNTER SYMPTOMS
CONSTIPATION: 1
DIARRHEA: 1
EYES NEGATIVE: 1
EYE DISCHARGE: 0
RESPIRATORY NEGATIVE: 1

## 2020-01-09 NOTE — PROGRESS NOTES
Nickolas Ornelas  YOB: 1970    Date of Service:  1/8/2020    Chief Complaint   Patient presents with    Diabetes   Recent diarrhea but says that he is constipated and has left flank pain. See orders. Treat his diarrhea with OTC meds as ordered and CT of his abdomen to see if he has a renal mass. Linzess if still constipated after OTC diarrhea meds trial.      Diabetes   He presents for his follow-up diabetic visit. He has type 2 diabetes mellitus. No MedicAlert identification noted. His disease course has been stable. There are no hypoglycemic associated symptoms. Pertinent negatives for hypoglycemia include no confusion or nervousness/anxiousness. There are no diabetic associated symptoms. Symptoms are stable. There are no diabetic complications. Risk factors for coronary artery disease include stress. Constipation   This is a new problem. The current episode started in the past 7 days. The problem is unchanged. Stool frequency: unclear, poor historian. The patient is not on a high fiber diet. He does not exercise regularly. Associated symptoms include diarrhea. Pertinent negatives include no difficulty urinating. Risk factors include obesity and stress.        Allergies   Allergen Reactions    Food      shrimp     Outpatient Medications Marked as Taking for the 1/8/20 encounter (Office Visit) with Sayra Varghese MD   Medication Sig Dispense Refill    sildenafil (VIAGRA) 100 MG tablet TAKE ONE TABLET BY MOUTH AS NEEDED FOR ERECTILE DYSFUNCTION 10 tablet 5    linaclotide (LINZESS) 145 MCG capsule Take 1 capsule by mouth every morning (before breakfast) 15 capsule 1    metFORMIN (GLUCOPHAGE) 1000 MG tablet TAKE 1 TABLET BY MOUTH TWICE DAILY WITH MEALS 180 tablet 1    atorvastatin (LIPITOR) 40 MG tablet TAKE 1 TABLET BY MOUTH ONCE DAILY 90 tablet 2    TOUJEO SOLOSTAR 300 UNIT/ML injection pen INJECT 24 UNITS SUBCUTANEOUSLY IN AM (Patient taking differently: INJECT 20 UNITS SUBCUTANEOUSLY IN AM) 9 pen 1    aspirin 81 MG tablet Take 81 mg by mouth daily      Insulin Pen Needle 31G X 8 MM MISC 1 each by Does not apply route daily 100 each 3    Lancets MISC Test once daily 50 each 3         There is no immunization history on file for this patient. Past Medical History:   Diagnosis Date    Diabetes mellitus ([facility]     ED (erectile dysfunction) 1/31/2013    Essential hypertension 9/10/2018    Hyperlipidemia LDL goal < 100 1/31/2013    Morbid obesity due to excess calories ([facility] 11/19/2018     No past surgical history on file. Family History   Problem Relation Age of Onset    Diabetes Mother     Diabetes Father     Diabetes Sister     Diabetes Brother        Review of Systems:  Review of Systems   Constitutional: Negative for activity change and appetite change. HENT: Negative. Eyes: Negative. Negative for discharge. Respiratory: Negative. Gastrointestinal: Positive for constipation and diarrhea. Genitourinary: Negative for difficulty urinating. Musculoskeletal: Negative for arthralgias. Skin: Negative for rash. Neurological: Negative. Psychiatric/Behavioral: Negative. Negative for agitation and confusion. The patient is not nervous/anxious. All other systems reviewed and are negative. Vitals:    01/08/20 1031   BP: 126/88   Pulse: 72   Temp: 98.2 °F (36.8 °C)   TempSrc: Oral   SpO2: 98%   Weight: 228 lb (103.4 kg)   Height: 5' 11\" (1.803 m)     Body mass index is 31.8 kg/m². Wt Readings from Last 3 Encounters:   01/08/20 228 lb (103.4 kg)   09/16/19 223 lb (101.2 kg)   05/13/19 223 lb (101.2 kg)     BP Readings from Last 3 Encounters:   01/08/20 126/88   09/16/19 118/82   05/13/19 114/81         Physical Exam  Constitutional:       Appearance: He is well-developed. HENT:      Head: Normocephalic and atraumatic. Eyes:      Conjunctiva/sclera: Conjunctivae normal.      Pupils: Pupils are equal, round, and reactive to light.    Neck: 15 capsule; Refill: 1  - CT ABDOMEN WO CONTRAST Additional Contrast? Radiologist Recommendation; Future  - CT ABDOMEN PELVIS WO CONTRAST Additional Contrast? Radiologist Recommendation; Future       Return in about 6 weeks (around 2/19/2020).

## 2020-01-13 ENCOUNTER — OFFICE VISIT (OUTPATIENT)
Dept: ENDOCRINOLOGY | Age: 50
End: 2020-01-13
Payer: COMMERCIAL

## 2020-01-13 VITALS
HEIGHT: 71 IN | OXYGEN SATURATION: 98 % | HEART RATE: 87 BPM | RESPIRATION RATE: 18 BRPM | SYSTOLIC BLOOD PRESSURE: 125 MMHG | WEIGHT: 233.2 LBS | BODY MASS INDEX: 32.65 KG/M2 | DIASTOLIC BLOOD PRESSURE: 84 MMHG

## 2020-01-13 PROCEDURE — 99214 OFFICE O/P EST MOD 30 MIN: CPT | Performed by: INTERNAL MEDICINE

## 2020-01-13 RX ORDER — INSULIN GLARGINE 300 U/ML
INJECTION, SOLUTION SUBCUTANEOUS
Qty: 9 PEN | Refills: 1 | Status: SHIPPED | OUTPATIENT
Start: 2020-01-13 | End: 2020-11-09 | Stop reason: SDUPTHER

## 2020-01-13 NOTE — PATIENT INSTRUCTIONS
· Your blood sugar stays outside the level your doctor set for you.     · You have any problems. Where can you learn more? Go to [email or web address] and sign in to your [employer] account. Enter H153 in the Smartfield box to learn more about \"Noninsulin Medicines for Type 2 Diabetes: Care Instructions. \"     If you do not have an account, please click on the \"Sign Up Now\" link. Current as of: April 16, 2019  Content Version: 12.3  © 2766-7675 Healthwise, Incorporated. Care instructions adapted under license by [facility] ([facility]). If you have questions about a medical condition or this instruction, always ask your healthcare professional. [address] any warranty or liability for your use of this information.

## 2020-01-13 NOTE — PROGRESS NOTES
Patient ID:   Kolton Traylor is a 45 y.o. male    Chief Complaint:   Kolton Traylor presents for an evaluation of Type 2 Diabetes Mellitus , Hyperlipidemia and hypertension. Subjective:   Type 2 Diabetes Mellitus diagnosed in   On insulin since 5595   Trulicity caused severe GI side effects   Invokana caused urinary issues      Metformin 1000MG BID   Toujeo 22 units daily at 6am  TTT stopped as he could not follow      Drives  in [facility] [employer], tense with situation at home, in marital dispute     Checks blood sugars 3 times per day. Reviewed    AM: 184-536  Lunch: 127-200  Supper: 118-171  HS:     Hypoglycemias: None     Meals Three dinner is bigger. Chips 2 bags per day, 90 osbaldo each. No sodas, some juice or regular tea. Exercise: Not doing stationary bike. 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      Family history of CAD: sister  of CAD at age 61   Denies smoking   On low dose Aspirin    The following portions of the patient's history were reviewed and updated as appropriate:       Family History   Problem Relation Age of Onset    Diabetes Mother     Diabetes Father     Diabetes Sister     Diabetes Brother          Social History     Socioeconomic History    Marital status: Single     Spouse name: Not on file    Number of children: Not on file    Years of education: Not on file    Highest education level: Not on file   Occupational History    Not on file   Social Needs    Financial resource strain: Not on file    Food insecurity:     Worry: Not on file     Inability: Not on file    Transportation needs:     Medical: Not on file     Non-medical: Not on file   Tobacco Use    Smoking status: Never Smoker    Smokeless tobacco: Never Used   Substance and Sexual Activity    Alcohol use: Yes     Comment: social    Drug use: No    Sexual activity: Yes   Lifestyle    Physical activity:     Days per needed for Erectile Dysfunction, Disp: 10 tablet, Rfl: 1    gabapentin (NEURONTIN) 100 MG capsule, Take 1 capsule by mouth 3 times daily for 14 days. ., Disp: 42 capsule, Rfl: 0      Review of Systems:    Constitutional: Negative for fever, chills, and unexpected weight change. HENT: Negative for congestion, ear pain, rhinorrhea,  sore throat and trouble swallowing. Eyes: Negative for photophobia, redness, itching. Respiratory: Negative for cough, shortness of breath and sputum. Cardiovascular: Negative for chest pain, palpitations and leg swelling. Gastrointestinal: Negative for nausea, vomiting, abdominal pain, diarrhea, constipation. Endocrine: Negative for cold intolerance, heat intolerance, polydipsia, polyphagia and polyuria. Genitourinary: Negative for dysuria, urgency, frequency, hematuria and flank pain. Musculoskeletal: Negative for myalgias, back pain, arthralgias and neck pain. Skin/Nail: Negative for rash, itching. Normal nails. Neurological: Negative for seizures, weakness, light-headedness, numbness and headaches. Hematological/ Lymph nodes: Negative for adenopathy. Does not bruise/bleed easily. Psychiatric/Behavioral: Negative for suicidal ideas, depression, anxiety, sleep disturbance and decreased concentration. Objective:   Physical Exam:  /84 (Site: Left Upper Arm, Position: Sitting, Cuff Size: Medium Adult)   Pulse 87   Resp 18   Ht 5' 11\" (1.803 m)   Wt 233 lb 3.2 oz (105.8 kg)   SpO2 98%   BMI 32.52 kg/m²   Constitutional: Patient is oriented to person, place, and time. Patient appears well-developed and well-nourished. HENT:    Head: Normocephalic and atraumatic. Eyes: Conjunctivae and EOM are normal. Pupils are equal, round, and reactive to light. Neck: Normal range of motion. Cardiovascular: Normal rate, regular rhythm and normal heart sounds. Pulmonary/Chest: Effort normal and breath sounds normal.   Abdominal: Soft.  Bowel sounds Color, UA 01/08/2020 YELLOW  Straw/Yellow Final    Clarity, UA 01/08/2020 Clear  Clear Final    Glucose, Ur 01/08/2020 Negative  Negative mg/dL Final    Bilirubin Urine 01/08/2020 Negative  Negative Final    Ketones, Urine 01/08/2020 Negative  Negative mg/dL Final    Specific [place], UA 01/08/2020 1.017  1.005 - 1.030 Final    Blood, Urine 01/08/2020 Negative  Negative Final    pH, UA 01/08/2020 5.5  5.0 - 8.0 Final    Protein, UA 01/08/2020 30* Negative mg/dL Final    Urobilinogen, Urine 01/08/2020 0.2  <2.0 E.U./dL Final    Nitrite, Urine 01/08/2020 Negative  Negative Final    Leukocyte Esterase, Urine 01/08/2020 Negative  Negative Final    Microscopic Examination 01/08/2020 YES   Final    Urine Type 01/08/2020 Cleancatch   Final    Hyaline Casts, UA 01/08/2020 1  0 - 8 /LPF Final    WBC, UA 01/08/2020 1  0 - 5 /HPF Final    RBC, UA 01/08/2020 1  0 - 4 /HPF Final    Epi Cells 01/08/2020 0  0 - 5 /HPF Final   Orders Only on 09/09/2019   Component Date Value Ref Range Status    Hemoglobin A1C 09/09/2019 7.8  See comment % Final    eAG 09/09/2019 177.2  mg/dL Final   Orders Only on 05/06/2019   Component Date Value Ref Range Status    Hemoglobin A1C 05/06/2019 7.6  See comment % Final    eAG 05/06/2019 171.4  mg/dL Final   Orders Only on 02/08/2019   Component Date Value Ref Range Status    Hemoglobin A1C 02/08/2019 9.0  See comment % Final    eAG 02/08/2019 211.6  mg/dL Final           Assessment and Plan     Negrito Melissa was seen today for diabetes. Diagnoses and all orders for this visit:    Uncontrolled type 2 diabetes mellitus with microalbuminuria, with long-term current use of insulin ([facility])  -     TOUJEO SOLOSTAR 300 UNIT/ML SOPN; INJECT 25 UNITS SUBCUTANEOUSLY IN AM  -     Hemoglobin A1C; Future  -     Comprehensive Metabolic Panel; Future  -     Microalbumin / Creatinine Urine Ratio;  Future    Dyslipidemia associated with type 2 diabetes mellitus ([address]    Morbid obesity due to excess

## 2020-03-24 ENCOUNTER — TELEPHONE (OUTPATIENT)
Dept: PRIMARY CARE CLINIC | Age: 50
End: 2020-03-24

## 2020-04-01 ENCOUNTER — TELEPHONE (OUTPATIENT)
Dept: ENDOCRINOLOGY | Age: 50
End: 2020-04-01

## 2020-04-01 NOTE — TELEPHONE ENCOUNTER
Mr. Betancourt Every advised to  stop milk thistle and increase Toujeo to 27 units daily . Expressed understanding.

## 2020-04-09 LAB
A/G RATIO: 1.6 (ref 1.1–2.2)
ALBUMIN SERPL-MCNC: 4.5 G/DL (ref 3.4–5)
ALP BLD-CCNC: 50 U/L (ref 40–129)
ALT SERPL-CCNC: 52 U/L (ref 10–40)
ANION GAP SERPL CALCULATED.3IONS-SCNC: 12 MMOL/L (ref 3–16)
AST SERPL-CCNC: 20 U/L (ref 15–37)
BILIRUB SERPL-MCNC: <0.2 MG/DL (ref 0–1)
BUN BLDV-MCNC: 15 MG/DL (ref 7–20)
CALCIUM SERPL-MCNC: 9.7 MG/DL (ref 8.3–10.6)
CHLORIDE BLD-SCNC: 101 MMOL/L (ref 99–110)
CO2: 27 MMOL/L (ref 21–32)
CREAT SERPL-MCNC: 0.8 MG/DL (ref 0.9–1.3)
CREATININE URINE: 88.2 MG/DL (ref 39–259)
ESTIMATED AVERAGE GLUCOSE: 254.7 MG/DL
GFR AFRICAN AMERICAN: >60
GFR NON-AFRICAN AMERICAN: >60
GLOBULIN: 2.8 G/DL
GLUCOSE BLD-MCNC: 278 MG/DL (ref 70–99)
HBA1C MFR BLD: 10.5 %
MICROALBUMIN UR-MCNC: 5.7 MG/DL
MICROALBUMIN/CREAT UR-RTO: 64.6 MG/G (ref 0–30)
POTASSIUM SERPL-SCNC: 4.7 MMOL/L (ref 3.5–5.1)
SODIUM BLD-SCNC: 140 MMOL/L (ref 136–145)
TOTAL PROTEIN: 7.3 G/DL (ref 6.4–8.2)

## 2020-04-13 ENCOUNTER — VIRTUAL VISIT (OUTPATIENT)
Dept: ENDOCRINOLOGY | Age: 50
End: 2020-04-13
Payer: COMMERCIAL

## 2020-04-13 PROBLEM — R74.8 ELEVATED LIVER ENZYMES: Status: ACTIVE | Noted: 2020-04-13

## 2020-04-13 PROCEDURE — 99214 OFFICE O/P EST MOD 30 MIN: CPT | Performed by: INTERNAL MEDICINE

## 2020-04-13 RX ORDER — ATORVASTATIN CALCIUM 40 MG/1
TABLET, FILM COATED ORAL
Qty: 90 TABLET | Refills: 3 | Status: SHIPPED | OUTPATIENT
Start: 2020-04-13 | End: 2020-06-16 | Stop reason: SDUPTHER

## 2020-04-13 NOTE — PROGRESS NOTES
 Highest education level: Not on file   Occupational History    Not on file   Social Needs    Financial resource strain: Not on file    Food insecurity     Worry: Not on file     Inability: Not on file    Transportation needs     Medical: Not on file     Non-medical: Not on file   Tobacco Use    Smoking status: Never Smoker    Smokeless tobacco: Never Used   Substance and Sexual Activity    Alcohol use: Yes     Comment: social    Drug use: No    Sexual activity: Yes   Lifestyle    Physical activity     Days per week: Not on file     Minutes per session: Not on file    Stress: Not on file   Relationships    Social connections     Talks on phone: Not on file     Gets together: Not on file     Attends Orthodox service: Not on file     Active member of club or organization: Not on file     Attends meetings of clubs or organizations: Not on file     Relationship status: Not on file    Intimate partner violence     Fear of current or ex partner: Not on file     Emotionally abused: Not on file     Physically abused: Not on file     Forced sexual activity: Not on file   Other Topics Concern    Not on file   Social History Narrative    Not on file       Past Medical History:   Diagnosis Date    Diabetes mellitus ([facility]     ED (erectile dysfunction) 1/31/2013    Essential hypertension 9/10/2018    Hyperlipidemia LDL goal < 100 1/31/2013    Morbid obesity due to excess calories ([facility] 11/19/2018       No past surgical history on file.       Allergies   Allergen Reactions    Food      shrimp         Current Outpatient Medications:     atorvastatin (LIPITOR) 40 MG tablet, TAKE 1 TABLET BY MOUTH ONCE DAILY, Disp: 90 tablet, Rfl: 3    TOUJEO SOLOSTAR 300 UNIT/ML SOPN, INJECT 25 UNITS SUBCUTANEOUSLY IN AM (Patient taking differently: INJECT 27 UNITS SUBCUTANEOUSLY IN AM), Disp: 9 pen, Rfl: 1    sildenafil (VIAGRA) 100 MG tablet, TAKE ONE TABLET BY MOUTH AS NEEDED FOR ERECTILE DYSFUNCTION, Disp: 10 tablet, Intact    Renal screen: 87, Jan 2020 > 63 April 2020   For proteinuria, need to control DM and lose weight. TSH screen: Jan 2017     2: HTN   Controlled with out meds   Add lisinopril for continued proteinuria   Also need to improve diabetes control   He wants to wait for lisinopril until next OV    3: Hyperlipidemia   LDL: 41, HDL: 43, TGs: 119 Jan 2020     Atorvastatin 40mg daily, refilled     4: Morbid obesity   Need to work on diet, exercise and lose weight     5: Elevated LFTs   Improving   Follows with pcp     RTC in 3 months A1C, MACR     EDUCATION:   Greater than 50% of this follow-up visit was spent in general counseling regarding   obesity, diet, exercise, importance of adherence to insulin regime, recognition and treatment of hypo and hyperglycemia,  glucose logging, proper diabetes management, diabetic complications with poor management and the importance of glycemic control in order to avoid the complications of diabetes. Risks and potential complications of diabetes were reviewed with the patient. Diabetes health maintenance plan and follow-up were discussed and understood by the patient. We reviewed the importance of medication compliance and regular follow-up. Aggressive lifestyle modification was encouraged. Exercise Counselling: This patient is a candidate for regular physical exercise. Instructions to perform the following types of exercise:  Swimming or water aerobic exercise  Brisk walking  Playing tennis  Stationary bicycle or elliptical indoor  Low impact aerobic exercise    Instructions given to exercise for the following duration:  30 minutes a day for five-seven days per week.     Following instructions for being active throughout the day in addition to formal exercise:  Walk instead of drive whenever possible  Take the stairs instead of the elevator  Work in the garden  Park to the far end of the parking lot to add more walking steps to destination      Electronically signed

## 2020-04-15 ENCOUNTER — TELEPHONE (OUTPATIENT)
Dept: ENDOCRINOLOGY | Age: 50
End: 2020-04-15

## 2020-05-12 ENCOUNTER — TELEPHONE (OUTPATIENT)
Dept: ENDOCRINOLOGY | Age: 50
End: 2020-05-12

## 2020-06-03 RX ORDER — SILDENAFIL 100 MG/1
TABLET, FILM COATED ORAL
Qty: 10 TABLET | Refills: 5 | Status: SHIPPED | OUTPATIENT
Start: 2020-06-03 | Stop reason: SDUPTHER

## 2020-06-11 ENCOUNTER — TELEPHONE (OUTPATIENT)
Dept: PRIMARY CARE CLINIC | Age: 50
End: 2020-06-11

## 2020-06-16 ENCOUNTER — VIRTUAL VISIT (OUTPATIENT)
Dept: PRIMARY CARE CLINIC | Age: 50
End: 2020-06-16
Payer: COMMERCIAL

## 2020-06-16 PROCEDURE — 99214 OFFICE O/P EST MOD 30 MIN: CPT | Performed by: INTERNAL MEDICINE

## 2020-06-16 RX ORDER — ATORVASTATIN CALCIUM 40 MG/1
TABLET, FILM COATED ORAL
Qty: 90 TABLET | Refills: 3 | Status: SHIPPED | OUTPATIENT
Start: 2020-06-16 | End: 2020-08-03 | Stop reason: SDUPTHER

## 2020-07-10 ENCOUNTER — OFFICE VISIT (OUTPATIENT)
Dept: PRIMARY CARE CLINIC | Age: 50
End: 2020-07-10
Payer: COMMERCIAL

## 2020-07-10 PROCEDURE — 99211 OFF/OP EST MAY X REQ PHY/QHP: CPT | Performed by: NURSE PRACTITIONER

## 2020-07-10 NOTE — PATIENT INSTRUCTIONS

## 2020-07-16 ENCOUNTER — TELEPHONE (OUTPATIENT)
Dept: PRIMARY CARE CLINIC | Age: 50
End: 2020-07-16

## 2020-07-16 LAB
SARS-COV-2: NOT DETECTED
SOURCE: NORMAL

## 2020-08-03 ENCOUNTER — OFFICE VISIT (OUTPATIENT)
Dept: ENDOCRINOLOGY | Age: 50
End: 2020-08-03
Payer: COMMERCIAL

## 2020-08-03 VITALS
HEIGHT: 71 IN | HEART RATE: 84 BPM | WEIGHT: 230.4 LBS | BODY MASS INDEX: 32.26 KG/M2 | DIASTOLIC BLOOD PRESSURE: 89 MMHG | TEMPERATURE: 98.2 F | OXYGEN SATURATION: 97 % | SYSTOLIC BLOOD PRESSURE: 133 MMHG

## 2020-08-03 PROCEDURE — 99214 OFFICE O/P EST MOD 30 MIN: CPT | Performed by: INTERNAL MEDICINE

## 2020-08-03 RX ORDER — ATORVASTATIN CALCIUM 40 MG/1
TABLET, FILM COATED ORAL
Qty: 90 TABLET | Refills: 3 | Status: SHIPPED | OUTPATIENT
Start: 2020-08-03 | End: 2020-11-09 | Stop reason: SDUPTHER

## 2020-08-03 NOTE — PATIENT INSTRUCTIONS
Patient Education        Diabetes and Preventing Falls: Care Instructions  Your Care Instructions     If you are an older adult who has diabetes, you may have a higher risk of falling. Complications of diabetes--such as nerve damage, foot problems, and reduced vision--may increase your risk of a fall. Some of your medicines also may add to your risk. By making your home safer, you can lower your risk of falling. Doing things to prevent diabetes complications may also help to lower your risk. You can make your home safer with a few simple measures. Follow-up care is a key part of your treatment and safety. Be sure to make and go to all appointments, and call your doctor if you are having problems. It's also a good idea to know your test results and keep a list of the medicines you take. How can you care for yourself at home? Taking care of yourself  · Keep your blood sugar at a target level (which you set with your doctor). · Exercise regularly to improve your strength, muscle tone, and balance. Walk if you can. Swimming may be a good choice if you cannot walk easily. · Have your vision checked as often as your doctor recommends. It is usually once a year or more often if you have eye problems. · Know the side effects of the medicines you take. Ask your doctor or pharmacist whether the medicines you take can affect your balance. Sleeping pills or sedatives can affect your balance. · Limit the amount of alcohol you drink. Alcohol can impair your balance and other senses. · Have your doctor check your feet during each visit. If you have a foot problem, see your doctor. Preventing falls at home  · Remove raised doorway thresholds, throw rugs, and clutter. Repair loose carpet or raised areas in the floor. · Move furniture and electrical cords to keep them out of walking paths. · Use nonskid floor wax, and wipe up spills right away, especially on ceramic tile floors.   · If you use a walker or cane, put rubber tips on it. If you use crutches, clean the bottoms of them regularly with an abrasive pad, such as steel wool. · Keep your house well lit, especially [facility], and outside walkways. Use night-lights in areas such as hallways and bathrooms. Add extra light switches or use remote switches (such as switches that go on or off when you clap your hands) to make it easier to turn lights on if you have to get up during the night. · Install sturdy handrails on stairways. Put grab bars near your shower, bathtub, and toilet. · Store household items on low shelves so that you do not have to climb or reach high. Or use a reaching device that you can get at a medical supply store. If you have to climb for something, use a step stool with handrails, or ask someone to get it for you. · Keep a cordless phone and a flashlight with new batteries by your bed. If possible, put a phone in each of the main rooms of your house, or carry a cell phone in case you fall and cannot reach a phone. Or you can wear a device around your neck or wrist. You push a button that sends a signal for help. · Wear low-heeled shoes that fit well and give your feet good support. Use footwear with nonskid soles. Check the heels and soles of your shoes for wear. Repair or replace worn heels or soles. · Do not wear socks without shoes on wood floors. · Walk on the grass when the sidewalks are slippery. If you live in an area that gets snow and ice in the winter, sprinkle salt on slippery steps and sidewalks. Where can you learn more? Go to https://FreePriceAlertspeInvestorio.de.Storybyte. org and sign in to your [employer] account. Enter I933 in the [facility] box to learn more about \"Diabetes and Preventing Falls: Care Instructions. \"     If you do not have an account, please click on the \"Sign Up Now\" link. Current as of: August 7, 2019               Content Version: 12.5  © 6351-1942 Healthwise, Incorporated.    Care instructions adapted under license by [facility] ([facility]). If you have questions about a medical condition or this instruction, always ask your healthcare professional. [address] any warranty or liability for your use of this information.

## 2020-08-03 NOTE — PROGRESS NOTES
Patient ID:   Robbin Ingram is a 45 y.o. male    Chief Complaint:   Robbin Ingram presents for an evaluation of Type 2 Diabetes Mellitus , Hyperlipidemia and hypertension. Subjective:   Type 2 Diabetes Mellitus diagnosed in   On insulin since 4114   Trulicity caused severe GI side effects   Invokana caused urinary issues      Metformin 1000MG BID. Missing some even doses   Toujeo 30 units daily at 6am  TTT stopped as he could not follow      Checks blood sugars 3 times per day. Reportedly    AM:   Lunch:   Supper:   HS:     Hypoglycemias: None     Meals Three dinner is bigger. Chips 2 bags per day, 90 osbaldo each. No sodas, some juice or regular tea. Exercise: Walking some. Not doing stationary bike. 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      Family history of CAD: sister  of CAD at age 61   Denies smoking   On low dose Aspirin    The following portions of the patient's history were reviewed and updated as appropriate:       Family History   Problem Relation Age of Onset    Diabetes Mother     Diabetes Father     Diabetes Sister     Diabetes Brother          Social History     Socioeconomic History    Marital status: Single     Spouse name: Not on file    Number of children: Not on file    Years of education: Not on file    Highest education level: Not on file   Occupational History    Not on file   Social Needs    Financial resource strain: Not on file    Food insecurity     Worry: Not on file     Inability: Not on file    Transportation needs     Medical: Not on file     Non-medical: Not on file   Tobacco Use    Smoking status: Never Smoker    Smokeless tobacco: Never Used   Substance and Sexual Activity    Alcohol use: Yes     Comment: social    Drug use: No    Sexual activity: Yes   Lifestyle    Physical activity     Days per week: Not on file     Minutes per session: Not on file    Stress: Not on file Relationships    Social connections     Talks on phone: Not on file     Gets together: Not on file     Attends Spiritism service: Not on file     Active member of club or organization: Not on file     Attends meetings of clubs or organizations: Not on file     Relationship status: Not on file    Intimate partner violence     Fear of current or ex partner: Not on file     Emotionally abused: Not on file     Physically abused: Not on file     Forced sexual activity: Not on file   Other Topics Concern    Not on file   Social History Narrative    Not on file       Past Medical History:   Diagnosis Date    Diabetes mellitus ([facility]    [facility] ED (erectile dysfunction) 1/31/2013    Essential hypertension 9/10/2018    Hyperlipidemia LDL goal < 100 1/31/2013    Morbid obesity due to excess calories ([facility] 11/19/2018       History reviewed. No pertinent surgical history. Allergies   Allergen Reactions    Food      shrimp         Current Outpatient Medications:     atorvastatin (LIPITOR) 40 MG tablet, TAKE 1 TABLET BY MOUTH ONCE DAILY, Disp: 90 tablet, Rfl: 3    sildenafil (VIAGRA) 100 MG tablet, TAKE ONE TABLET BY MOUTH AS NEEDED FOR ERECTILE DYSFUNCTION, Disp: 10 tablet, Rfl: 5    metFORMIN (GLUCOPHAGE) 1000 MG tablet, TAKE 1 TABLET BY MOUTH TWICE DAILY WITH MEALS, Disp: 180 tablet, Rfl: 0    TOUJEO SOLOSTAR 300 UNIT/ML SOPN, INJECT 25 UNITS SUBCUTANEOUSLY IN AM (Patient taking differently: INJECT 27 UNITS SUBCUTANEOUSLY IN AM), Disp: 9 pen, Rfl: 1    aspirin 81 MG tablet, Take 81 mg by mouth daily, Disp: , Rfl:     Insulin Pen Needle 31G X 8 MM MISC, 1 each by Does not apply route daily, Disp: 100 each, Rfl: 3    Lancets MISC, Test once daily, Disp: 50 each, Rfl: 3    sildenafil (VIAGRA) 100 MG tablet, Take 1 tablet by mouth as needed for Erectile Dysfunction, Disp: 10 tablet, Rfl: 1    gabapentin (NEURONTIN) 100 MG capsule, Take 1 capsule by mouth 3 times daily for 14 days. ., Disp: 42 capsule, Rfl: Skin is warm and dry. Psychiatric: Patient has a normal mood and affect.  Patient behavior is normal.     Lab Review:    Orders Only on 07/29/2020   Component Date Value Ref Range Status    Microalbumin, Random Urine 07/29/2020 4.60* <2.0 mg/dL Final    Creatinine, Ur 07/29/2020 107.2  39.0 - 259.0 mg/dL Final    Microalbumin Creatinine Ratio 07/29/2020 42.9* 0.0 - 30.0 mg/g Final    Hemoglobin A1C 07/29/2020 10.4  See comment % Final    eAG 07/29/2020 251.8  mg/dL Final   Office Visit on 07/10/2020   Component Date Value Ref Range Status    SARS-CoV-2 07/10/2020 Not Detected  Not Detected Final    Source 07/10/2020 NP swab   Final   Orders Only on 04/09/2020   Component Date Value Ref Range Status    Microalbumin, Random Urine 04/09/2020 5.70* <2.0 mg/dL Final    Creatinine, Ur 04/09/2020 88.2  39.0 - 259.0 mg/dL Final    Microalbumin Creatinine Ratio 04/09/2020 64.6* 0.0 - 30.0 mg/g Final    Sodium 04/09/2020 140  136 - 145 mmol/L Final    Potassium 04/09/2020 4.7  3.5 - 5.1 mmol/L Final    Chloride 04/09/2020 101  99 - 110 mmol/L Final    CO2 04/09/2020 27  21 - 32 mmol/L Final    Anion Gap 04/09/2020 12  3 - 16 Final    Glucose 04/09/2020 278* 70 - 99 mg/dL Final    BUN 04/09/2020 15  7 - 20 mg/dL Final    CREATININE 04/09/2020 0.8* 0.9 - 1.3 mg/dL Final    GFR Non- 04/09/2020 >60  >60 Final    GFR  04/09/2020 >60  >60 Final    Calcium 04/09/2020 9.7  8.3 - 10.6 mg/dL Final    Total Protein 04/09/2020 7.3  6.4 - 8.2 g/dL Final    Alb 04/09/2020 4.5  3.4 - 5.0 g/dL Final    Albumin/Globulin Ratio 04/09/2020 1.6  1.1 - 2.2 Final    Total Bilirubin 04/09/2020 <0.2  0.0 - 1.0 mg/dL Final    Alkaline Phosphatase 04/09/2020 50  40 - 129 U/L Final    ALT 04/09/2020 52* 10 - 40 U/L Final    AST 04/09/2020 20  15 - 37 U/L Final    Globulin 04/09/2020 2.8  g/dL Final    Hemoglobin A1C 04/09/2020 10.5  See comment % Final    eAG 04/09/2020 254.7  mg/dL Final Orders Only on 01/08/2020   Component Date Value Ref Range Status    Microalbumin, Random Urine 01/08/2020 13.90* <2.0 mg/dL Final    Creatinine, Ur 01/08/2020 156.8  39.0 - 259.0 mg/dL Final    Microalbumin Creatinine Ratio 01/08/2020 88.6* 0.0 - 30.0 mg/g Final    Sodium 01/08/2020 142  136 - 145 mmol/L Final    Potassium 01/08/2020 4.7  3.5 - 5.1 mmol/L Final    Chloride 01/08/2020 102  99 - 110 mmol/L Final    CO2 01/08/2020 24  21 - 32 mmol/L Final    Anion Gap 01/08/2020 16  3 - 16 Final    Glucose 01/08/2020 158* 70 - 99 mg/dL Final    BUN 01/08/2020 8  7 - 20 mg/dL Final    CREATININE 01/08/2020 0.7* 0.9 - 1.3 mg/dL Final    GFR Non- 01/08/2020 >60  >60 Final    GFR  01/08/2020 >60  >60 Final    Calcium 01/08/2020 9.8  8.3 - 10.6 mg/dL Final    Total Protein 01/08/2020 7.5  6.4 - 8.2 g/dL Final    Alb 01/08/2020 4.8  3.4 - 5.0 g/dL Final    Albumin/Globulin Ratio 01/08/2020 1.8  1.1 - 2.2 Final    Total Bilirubin 01/08/2020 0.4  0.0 - 1.0 mg/dL Final    Alkaline Phosphatase 01/08/2020 45  40 - 129 U/L Final    ALT 01/08/2020 68* 10 - 40 U/L Final    AST 01/08/2020 50* 15 - 37 U/L Final    Globulin 01/08/2020 2.7  g/dL Final    Cholesterol, Fasting 01/08/2020 108  0 - 199 mg/dL Final    Triglyceride, Fasting 01/08/2020 119  0 - 150 mg/dL Final    HDL 01/08/2020 43  40 - 60 mg/dL Final    LDL Calculated 01/08/2020 41  <100 mg/dL Final    VLDL Cholesterol Calculated 01/08/2020 24  Not Established mg/dL Final    Hemoglobin A1C 01/08/2020 8.4  See comment % Final    eAG 01/08/2020 194.4  mg/dL Final   Orders Only on 01/08/2020   Component Date Value Ref Range Status    Color, UA 01/08/2020 YELLOW  Straw/Yellow Final    Clarity, UA 01/08/2020 Clear  Clear Final    Glucose, Ur 01/08/2020 Negative  Negative mg/dL Final    Bilirubin Urine 01/08/2020 Negative  Negative Final    Ketones, Urine 01/08/2020 Negative  Negative mg/dL Final    Specific Gravity, UA 01/08/2020 1.017  1.005 - 1.030 Final    Blood, Urine 01/08/2020 Negative  Negative Final    pH, UA 01/08/2020 5.5  5.0 - 8.0 Final    Protein, UA 01/08/2020 30* Negative mg/dL Final    Urobilinogen, Urine 01/08/2020 0.2  <2.0 E.U./dL Final    Nitrite, Urine 01/08/2020 Negative  Negative Final    Leukocyte Esterase, Urine 01/08/2020 Negative  Negative Final    Microscopic Examination 01/08/2020 YES   Final    Urine Type 01/08/2020 Cleancatch   Final    Hyaline Casts, UA 01/08/2020 1  0 - 8 /LPF Final    WBC, UA 01/08/2020 1  0 - 5 /HPF Final    RBC, UA 01/08/2020 1  0 - 4 /HPF Final    Epithelial Cells, UA 01/08/2020 0  0 - 5 /HPF Final   Orders Only on 09/09/2019   Component Date Value Ref Range Status    Hemoglobin A1C 09/09/2019 7.8  See comment % Final    eAG 09/09/2019 177.2  mg/dL Final           Assessment and Plan     Yasmeen Montemayor was seen today for follow-up and diabetes. Diagnoses and all orders for this visit:    Essential hypertension    Uncontrolled type 2 diabetes mellitus with microalbuminuria, with long-term current use of insulin (HCC)  -     atorvastatin (LIPITOR) 40 MG tablet; TAKE 1 TABLET BY MOUTH ONCE DAILY  -     Hemoglobin A1C; Future  -     Microalbumin / Creatinine Urine Ratio; Future    Dyslipidemia associated with type 2 diabetes mellitus (HCC)  -     atorvastatin (LIPITOR) 40 MG tablet; TAKE 1 TABLET BY MOUTH ONCE DAILY  -     Hemoglobin A1C; Future  -     Microalbumin / Creatinine Urine Ratio; Future    Morbid obesity due to excess calories ([address]          1: Type 2 DM complicated with nephropathy   Uncontrolled A1C 10.4 %< 10.5% < 7.8% < 7.6% < 9% < 8.9 %< 10.9%      Blood sugars are higher than last time     We need to work on diet and exercise    Increase work out /bike to 5 days a week     Metformin 1000MG BID breakfast and lunch   Change Toujeo to 34 units daily at 6am     Need to cut down beer     All instructions provided in written.    Check Blood sugars

## 2020-08-07 ENCOUNTER — VIRTUAL VISIT (OUTPATIENT)
Dept: PRIMARY CARE CLINIC | Age: 50
End: 2020-08-07
Payer: COMMERCIAL

## 2020-08-07 PROCEDURE — 99213 OFFICE O/P EST LOW 20 MIN: CPT | Performed by: INTERNAL MEDICINE

## 2020-08-07 RX ORDER — HYDROXYZINE HYDROCHLORIDE 25 MG/1
25 TABLET, FILM COATED ORAL 3 TIMES DAILY PRN
Qty: 90 TABLET | Refills: 1 | Status: SHIPPED | OUTPATIENT
Start: 2020-08-07 | End: 2020-09-06

## 2020-08-07 NOTE — PATIENT INSTRUCTIONS
Continue Hydroxyzine as needed up to 3 times a day for the recurrent rash. Reduce the frequency of dosing to before bed only if this medication makes you drowsy during the day.

## 2020-08-07 NOTE — PROGRESS NOTES
Kailee Whiting is a 45 y.o. male evaluated via telephone on 8/7/2020. Consent:  He and/or health care decision maker is aware that that he may receive a bill for this telephone service, depending on his insurance coverage, and has provided verbal consent to proceed: Yes      Documentation:  I communicated with the patient and/or health care decision maker about Recurrent urticarial skin rash that responds to hydroxyzine. Details of this discussion including any medical advice provided: Renew hydroxyzine. I affirm this is a Patient Initiated Episode with a Patient who has not had a related appointment within my department in the past 7 days or scheduled within the next 24 hours.     Patient identification was verified at the start of the visit: Yes    Total Time: minutes: 11-20 minutes    Note: not billable if this call serves to triage the patient into an appointment for the relevant concern      Chuck Qureshi

## 2020-09-15 ENCOUNTER — NURSE TRIAGE (OUTPATIENT)
Dept: OTHER | Facility: CLINIC | Age: 50
End: 2020-09-15

## 2020-09-15 NOTE — TELEPHONE ENCOUNTER
Reason for Disposition   SEVERE sore throat pain    Answer Assessment - Initial Assessment Questions  1. ONSET: \"When did the throat start hurting? \" (Hours or days ago)       Three days ago  2. SEVERITY: \"How bad is the sore throat? \" (Scale 1-10; mild, moderate or severe)    - MILD (1-3):  doesn't interfere with eating or normal activities    - MODERATE (4-7): interferes with eating some solids and normal activities    - SEVERE (8-10):  excruciating pain, interferes with most normal activities    - SEVERE DYSPHAGIA: can't swallow liquids, drooling      severe  3. STREP EXPOSURE: \"Has there been any exposure to strep within the past week? \" If so, ask: \"What type of contact occurred? \"       no  4. VIRAL SYMPTOMS: Manuel Hooker there any symptoms of a cold, such as a runny nose, cough, hoarse voice or red eyes? \"       no  5. FEVER: \"Do you have a fever? \" If so, ask: \"What is your temperature, how was it measured, and when did it start? \"      no  6. PUS ON THE TONSILS: \"Is there pus on the tonsils in the back of your throat? \"      No redness  7. OTHER SYMPTOMS: \"Do you have any other symptoms? \" (e.g., difficulty breathing, headache, rash)      Neck pain  8. PREGNANCY: \"Is there any chance you are pregnant? \" \"When was your last menstrual period? \"      n/a    Protocols used: SORE THROAT-ADULT-[place]    Please do not respond to the triage nurse through this encounter. Any subsequent communication should be directly with the patient.   Warm transfer to [facility]

## 2020-09-28 ENCOUNTER — TELEPHONE (OUTPATIENT)
Dept: ENDOCRINOLOGY | Age: 50
End: 2020-09-28

## 2020-09-28 NOTE — TELEPHONE ENCOUNTER
Left a message to contact the office. Per Dr. Jeri Guerra: I am not aware of any benefit. He can try it and see if it helps.

## 2020-11-03 DIAGNOSIS — E11.69 DYSLIPIDEMIA ASSOCIATED WITH TYPE 2 DIABETES MELLITUS (HCC): ICD-10-CM

## 2020-11-03 DIAGNOSIS — E78.5 DYSLIPIDEMIA ASSOCIATED WITH TYPE 2 DIABETES MELLITUS (HCC): ICD-10-CM

## 2020-11-03 LAB
CREATININE URINE: 129.1 MG/DL (ref 39–259)
MICROALBUMIN UR-MCNC: 7.7 MG/DL
MICROALBUMIN/CREAT UR-RTO: 59.6 MG/G (ref 0–30)

## 2020-11-04 LAB
ESTIMATED AVERAGE GLUCOSE: 223.1 MG/DL
HBA1C MFR BLD: 9.4 %

## 2020-11-09 ENCOUNTER — VIRTUAL VISIT (OUTPATIENT)
Dept: ENDOCRINOLOGY | Age: 50
End: 2020-11-09
Payer: COMMERCIAL

## 2020-11-09 PROCEDURE — 99214 OFFICE O/P EST MOD 30 MIN: CPT | Performed by: INTERNAL MEDICINE

## 2020-11-09 RX ORDER — INSULIN GLARGINE 300 U/ML
INJECTION, SOLUTION SUBCUTANEOUS
Qty: 18 ML | Refills: 0 | OUTPATIENT
Start: 2020-11-09

## 2020-11-09 RX ORDER — INSULIN GLARGINE 300 U/ML
INJECTION, SOLUTION SUBCUTANEOUS
Qty: 9 PEN | Refills: 1 | Status: SHIPPED | OUTPATIENT
Start: 2020-11-09 | End: 2020-11-11 | Stop reason: SDUPTHER

## 2020-11-09 RX ORDER — ATORVASTATIN CALCIUM 40 MG/1
TABLET, FILM COATED ORAL
Qty: 90 TABLET | Refills: 3 | Status: SHIPPED | OUTPATIENT
Start: 2020-11-09 | End: 2021-02-15 | Stop reason: SDUPTHER

## 2020-11-09 NOTE — PROGRESS NOTES
Patient ID:   Maggy Chery is a 45 y.o. male    Chief Complaint:   Maggy Chery presents for an evaluation of Type 2 Diabetes Mellitus , Hyperlipidemia and hypertension. Subjective:   Type 2 Diabetes Mellitus diagnosed in   On insulin since 1708   Trulicity caused severe GI side effects   Invokana caused urinary issues      Metformin 1000MG bid. Missing some even doses   Toujeo 34 units daily at 6am  TTT stopped as he could not follow      Checks blood sugars 2 times per day. Reportedly    AM: 134- 219    Lunch:   Supper:   HS: 140 -191    Hypoglycemias: None     Meals Three dinner is bigger. Chips 2 bags per day, 90 osbaldo each. No sodas, some juice or regular tea. Exercise: Walking at plant, may be 2 miles per day. Bike around the neighborhood twice a week. 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      Family history of CAD: sister  of CAD at age 61   Denies smoking   On low dose Aspirin    The following portions of the patient's history were reviewed and updated as appropriate:       Family History   Problem Relation Age of Onset    Diabetes Mother     Diabetes Father     Diabetes Sister     Diabetes Brother          Social History     Socioeconomic History    Marital status: Single     Spouse name: Not on file    Number of children: Not on file    Years of education: Not on file    Highest education level: Not on file   Occupational History    Not on file   Social Needs    Financial resource strain: Not on file    Food insecurity     Worry: Not on file     Inability: Not on file    Transportation needs     Medical: Not on file     Non-medical: Not on file   Tobacco Use    Smoking status: Never Smoker    Smokeless tobacco: Never Used   Substance and Sexual Activity    Alcohol use: Yes     Comment: social    Drug use: No    Sexual activity: Yes   Lifestyle    Physical activity     Days per week: Not on file Systems:    Constitutional: Negative for fever, chills, and unexpected weight change. HENT: Negative for congestion, ear pain, rhinorrhea,  sore throat and trouble swallowing. Eyes: Negative for photophobia, redness, itching. Respiratory: Negative for cough, shortness of breath and sputum. Cardiovascular: Negative for chest pain, palpitations and leg swelling. Gastrointestinal: Negative for nausea, vomiting, abdominal pain, diarrhea, constipation. Endocrine: Negative for cold intolerance, heat intolerance, polydipsia, polyphagia and polyuria. Genitourinary: Negative for dysuria, urgency, frequency, hematuria and flank pain. Musculoskeletal: Negative for myalgias, back pain, arthralgias and neck pain. Skin/Nail: Negative for rash, itching. Normal nails. Neurological: Negative for seizures, weakness, light-headedness, numbness and headaches. Hematological/ Lymph nodes: Negative for adenopathy. Does not bruise/bleed easily. Psychiatric/Behavioral: Negative for suicidal ideas, depression, anxiety, sleep disturbance and decreased concentration. Objective:   Physical Exam:  There were no vitals taken for this visit. Constitutional: Patient is oriented to person, place, and time. Patient appears well-developed and well-nourished. Pulmonary/Chest: Effort normal.   Neurological: Patient is alert and oriented to person, place, and time. Psychiatric: Patient has a normal mood and affect.  Patient behavior is normal.     Lab Review:    Orders Only on 11/03/2020   Component Date Value Ref Range Status    Hemoglobin A1C 11/03/2020 9.4  See comment % Final    eAG 11/03/2020 223.1  mg/dL Final    Microalbumin, Random Urine 11/03/2020 7.70* <2.0 mg/dL Final    Creatinine, Ur 11/03/2020 129.1  39.0 - 259.0 mg/dL Final    Microalbumin Creatinine Ratio 11/03/2020 59.6* 0.0 - 30.0 mg/g Final   Orders Only on 07/29/2020   Component Date Value Ref Range Status    Microalbumin, Random Urine 07/29/2020 4.60* <2.0 mg/dL Final    Creatinine, Ur 07/29/2020 107.2  39.0 - 259.0 mg/dL Final    Microalbumin Creatinine Ratio 07/29/2020 42.9* 0.0 - 30.0 mg/g Final    Hemoglobin A1C 07/29/2020 10.4  See comment % Final    eAG 07/29/2020 251.8  mg/dL Final   Office Visit on 07/10/2020   Component Date Value Ref Range Status    SARS-CoV-2 07/10/2020 Not Detected  Not Detected Final    Source 07/10/2020 NP swab   Final   Orders Only on 04/09/2020   Component Date Value Ref Range Status    Microalbumin, Random Urine 04/09/2020 5.70* <2.0 mg/dL Final    Creatinine, Ur 04/09/2020 88.2  39.0 - 259.0 mg/dL Final    Microalbumin Creatinine Ratio 04/09/2020 64.6* 0.0 - 30.0 mg/g Final    Sodium 04/09/2020 140  136 - 145 mmol/L Final    Potassium 04/09/2020 4.7  3.5 - 5.1 mmol/L Final    Chloride 04/09/2020 101  99 - 110 mmol/L Final    CO2 04/09/2020 27  21 - 32 mmol/L Final    Anion Gap 04/09/2020 12  3 - 16 Final    Glucose 04/09/2020 278* 70 - 99 mg/dL Final    BUN 04/09/2020 15  7 - 20 mg/dL Final    CREATININE 04/09/2020 0.8* 0.9 - 1.3 mg/dL Final    GFR Non- 04/09/2020 >60  >60 Final    GFR  04/09/2020 >60  >60 Final    Calcium 04/09/2020 9.7  8.3 - 10.6 mg/dL Final    Total Protein 04/09/2020 7.3  6.4 - 8.2 g/dL Final    Alb 04/09/2020 4.5  3.4 - 5.0 g/dL Final    Albumin/Globulin Ratio 04/09/2020 1.6  1.1 - 2.2 Final    Total Bilirubin 04/09/2020 <0.2  0.0 - 1.0 mg/dL Final    Alkaline Phosphatase 04/09/2020 50  40 - 129 U/L Final    ALT 04/09/2020 52* 10 - 40 U/L Final    AST 04/09/2020 20  15 - 37 U/L Final    Globulin 04/09/2020 2.8  g/dL Final    Hemoglobin A1C 04/09/2020 10.5  See comment % Final    eAG 04/09/2020 254.7  mg/dL Final   Orders Only on 01/08/2020   Component Date Value Ref Range Status    Microalbumin, Random Urine 01/08/2020 13.90* <2.0 mg/dL Final    Creatinine, Ur 01/08/2020 156.8  39.0 - 259.0 mg/dL Final    Microalbumin Creatinine Ratio 01/08/2020 88.6* 0.0 - 30.0 mg/g Final    Sodium 01/08/2020 142  136 - 145 mmol/L Final    Potassium 01/08/2020 4.7  3.5 - 5.1 mmol/L Final    Chloride 01/08/2020 102  99 - 110 mmol/L Final    CO2 01/08/2020 24  21 - 32 mmol/L Final    Anion Gap 01/08/2020 16  3 - 16 Final    Glucose 01/08/2020 158* 70 - 99 mg/dL Final    BUN 01/08/2020 8  7 - 20 mg/dL Final    CREATININE 01/08/2020 0.7* 0.9 - 1.3 mg/dL Final    GFR Non- 01/08/2020 >60  >60 Final    GFR  01/08/2020 >60  >60 Final    Calcium 01/08/2020 9.8  8.3 - 10.6 mg/dL Final    Total Protein 01/08/2020 7.5  6.4 - 8.2 g/dL Final    Alb 01/08/2020 4.8  3.4 - 5.0 g/dL Final    Albumin/Globulin Ratio 01/08/2020 1.8  1.1 - 2.2 Final    Total Bilirubin 01/08/2020 0.4  0.0 - 1.0 mg/dL Final    Alkaline Phosphatase 01/08/2020 45  40 - 129 U/L Final    ALT 01/08/2020 68* 10 - 40 U/L Final    AST 01/08/2020 50* 15 - 37 U/L Final    Globulin 01/08/2020 2.7  g/dL Final    Cholesterol, Fasting 01/08/2020 108  0 - 199 mg/dL Final    Triglyceride, Fasting 01/08/2020 119  0 - 150 mg/dL Final    HDL 01/08/2020 43  40 - 60 mg/dL Final    LDL Calculated 01/08/2020 41  <100 mg/dL Final    VLDL Cholesterol Calculated 01/08/2020 24  Not Established mg/dL Final    Hemoglobin A1C 01/08/2020 8.4  See comment % Final    eAG 01/08/2020 194.4  mg/dL Final   Orders Only on 01/08/2020   Component Date Value Ref Range Status    Color, UA 01/08/2020 YELLOW  Straw/Yellow Final    Clarity, UA 01/08/2020 Clear  Clear Final    Glucose, Ur 01/08/2020 Negative  Negative mg/dL Final    Bilirubin Urine 01/08/2020 Negative  Negative Final    Ketones, Urine 01/08/2020 Negative  Negative mg/dL Final    Specific [place], UA 01/08/2020 1.017  1.005 - 1.030 Final    Blood, Urine 01/08/2020 Negative  Negative Final    pH, UA 01/08/2020 5.5  5.0 - 8.0 Final    Protein, UA 01/08/2020 30* Negative mg/dL Final    Urobilinogen, Urine 01/08/2020 0.2  <2.0 E.U./dL Final    Nitrite, Urine 01/08/2020 Negative  Negative Final    Leukocyte Esterase, Urine 01/08/2020 Negative  Negative Final    Microscopic Examination 01/08/2020 YES   Final    Urine Type 01/08/2020 Cleancatch   Final    Hyaline Casts, UA 01/08/2020 1  0 - 8 /LPF Final    WBC, UA 01/08/2020 1  0 - 5 /HPF Final    RBC, UA 01/08/2020 1  0 - 4 /HPF Final    Epithelial Cells, UA 01/08/2020 0  0 - 5 /HPF Final           Assessment and Plan     Bob Reyes was seen today for diabetes. Diagnoses and all orders for this visit:    Uncontrolled type 2 diabetes mellitus with microalbuminuria, with long-term current use of insulin ([facility])  -     atorvastatin (LIPITOR) 40 MG tablet; TAKE 1 TABLET BY MOUTH ONCE DAILY  -     metFORMIN (GLUCOPHAGE) 1000 MG tablet; One tab twice a day with food  -     TOUJEO SOLOSTAR 300 UNIT/ML SOPN; INJECT 34 UNITS SUBCUTANEOUSLY IN AM  -     Hemoglobin A1C; Future  -     Comprehensive Metabolic Panel; Future  -     CBC Auto Differential; Future  -     Fructosamine; Future  -     HEMOGLOBINOPATHY EVALUATION; Future    Dyslipidemia associated with type 2 diabetes mellitus ([facility])  -     atorvastatin (LIPITOR) 40 MG tablet; TAKE 1 TABLET BY MOUTH ONCE DAILY  -     metFORMIN (GLUCOPHAGE) 1000 MG tablet; One tab twice a day with food  -     TOUJEO SOLOSTAR 300 UNIT/ML SOPN; INJECT 34 UNITS SUBCUTANEOUSLY IN AM  -     Hemoglobin A1C; Future  -     Comprehensive Metabolic Panel; Future  -     CBC Auto Differential; Future  -     Fructosamine; Future  -     HEMOGLOBINOPATHY EVALUATION; Future    Type 2 diabetes mellitus without complication, without long-term current use of insulin ([facility])  -     atorvastatin (LIPITOR) 40 MG tablet; TAKE 1 TABLET BY MOUTH ONCE DAILY  -     metFORMIN (GLUCOPHAGE) 1000 MG tablet;  One tab twice a day with food  -     TOUJEO SOLOSTAR 300 UNIT/ML SOPN; INJECT 34 UNITS SUBCUTANEOUSLY IN AM  -     Hemoglobin A1C; Future  -     Comprehensive Metabolic Panel; Future  -     CBC Auto Differential; Future  -     Fructosamine; Future  -     HEMOGLOBINOPATHY EVALUATION; Future    Essential hypertension  -     atorvastatin (LIPITOR) 40 MG tablet; TAKE 1 TABLET BY MOUTH ONCE DAILY  -     metFORMIN (GLUCOPHAGE) 1000 MG tablet; One tab twice a day with food  -     TOUJEO SOLOSTAR 300 UNIT/ML SOPN; INJECT 34 UNITS SUBCUTANEOUSLY IN AM  -     Hemoglobin A1C; Future  -     Comprehensive Metabolic Panel; Future  -     CBC Auto Differential; Future  -     Fructosamine; Future  -     HEMOGLOBINOPATHY EVALUATION; Future    Morbid obesity due to excess calories (HCC)  -     atorvastatin (LIPITOR) 40 MG tablet; TAKE 1 TABLET BY MOUTH ONCE DAILY  -     metFORMIN (GLUCOPHAGE) 1000 MG tablet; One tab twice a day with food  -     TOUJEO SOLOSTAR 300 UNIT/ML SOPN; INJECT 34 UNITS SUBCUTANEOUSLY IN AM  -     Hemoglobin A1C; Future  -     Comprehensive Metabolic Panel; Future  -     CBC Auto Differential; Future  -     Fructosamine; Future  -     HEMOGLOBINOPATHY EVALUATION; Future          1: Type 2 DM complicated with nephropathy   Uncontrolled A1C 9.4% < 10.4 %< 10.5% < 7.8% < 7.6% < 9% < 8.9 %< 10.9%      Blood sugars are better than A1C     We need to work on diet and exercise  He wants to work on diet   Will not make a change in insulin     Metformin 1000MG BID breakfast and lunch   C/we Toujeo to 34 units daily at 6am     Need to cut down beer     All instructions provided in written. Check Blood sugars 2-3 times per day. Log them along with insulin and send them every 2 weeks. Call for blood sugars less than 60 or more than 400. Eye exam: Last exam in April 2019, aubrey RODRIGUEZ Foot exam: Sep 2019    Deformity/amputation: absent  Skin lesions/pre-ulcerative calluses: absent  Edema: right- negative, left- negative  Sensory exam: Monofilament sensation: normal  Pulses: normal, Vibration (128 Hz):  Intact    Renal screen: 88, Jan 2020 > 64 April 2020 > 43 July 2020 > 59 Nov 2020   For proteinuria, need to control DM and lose weight. TSH screen: Jan 2017     2: HTN   Controlled with out meds   May add lisinopril for continued proteinuria   Also need to improve diabetes control     3: Hyperlipidemia   LDL: 41, HDL: 43, TGs: 119 Jan 2020     Atorvastatin 40mg daily     4: Morbid obesity   Need to work on diet, exercise and lose weight     5: Elevated LFTs   Improving   Follows with pcp     RTC in 3 months A1C, Fructosamine, Hb eval, CBC     EDUCATION:   Greater than 50% of this follow-up visit was spent in general counseling regarding   obesity, diet, exercise, importance of adherence to insulin regime, recognition and treatment of hypo and hyperglycemia,  glucose logging, proper diabetes management, diabetic complications with poor management and the importance of glycemic control in order to avoid the complications of diabetes. Risks and potential complications of diabetes were reviewed with the patient. Diabetes health maintenance plan and follow-up were discussed and understood by the patient. We reviewed the importance of medication compliance and regular follow-up. Aggressive lifestyle modification was encouraged. Exercise Counselling: This patient is a candidate for regular physical exercise. Instructions to perform the following types of exercise:  Swimming or water aerobic exercise  Brisk walking  Playing tennis  Stationary bicycle or elliptical indoor  Low impact aerobic exercise    Instructions given to exercise for the following duration:  30 minutes a day for five-seven days per week.     Following instructions for being active throughout the day in addition to formal exercise:  Walk instead of drive whenever possible  Take the stairs instead of the elevator  Work in the garden  Park to the far end of the parking lot to add more walking steps to destination      Electronically signed by Rosalva Shore MD on 11/9/2020 at 4:48 PM

## 2020-11-11 RX ORDER — INSULIN GLARGINE 300 U/ML
INJECTION, SOLUTION SUBCUTANEOUS
Qty: 18 PEN | Refills: 0 | Status: SHIPPED | OUTPATIENT
Start: 2020-11-11 | End: 2021-08-11

## 2020-12-14 RX ORDER — SILDENAFIL 100 MG/1
TABLET, FILM COATED ORAL
Qty: 10 TABLET | Refills: 5 | Status: SHIPPED | OUTPATIENT
Start: 2020-12-14 | End: 2021-07-06

## 2020-12-14 NOTE — TELEPHONE ENCOUNTER
Medication:   Requested Prescriptions     Pending Prescriptions Disp Refills    sildenafil (VIAGRA) 100 MG tablet [Pharmacy Med Name: Sildenafil Citrate 100 MG Oral Tablet] 10 tablet 0     Sig: TAKE 1 TABLET BY MOUTH AS NEEDED FOR ERECTILE DYSFUNCTION        Last Filled:      Patient Phone Number: [phone] (home) [phone] (work)    Last appt: 8/7/2020   Next appt: Visit date not found    Last OARRS: No flowsheet data found.

## 2021-01-19 ENCOUNTER — TELEPHONE (OUTPATIENT)
Dept: PRIMARY CARE CLINIC | Age: 51
End: 2021-01-19

## 2021-01-21 ENCOUNTER — TELEPHONE (OUTPATIENT)
Dept: PRIMARY CARE CLINIC | Age: 51
End: 2021-01-21

## 2021-01-21 NOTE — TELEPHONE ENCOUNTER
----- Message from Mani Clark sent at 1/21/2021  9:53 AM EST -----  Subject: Message to Provider    QUESTIONS  Information for Provider? Patient is at work and had to clean the covid   areas where people got sick he said he is a diabetic and high risk and   needs a note to give to his job  ---------------------------------------------------------------------------  --------------  [address]  What is the best way for the office to contact you? OK to leave message on   voicemail  Preferred Call Back Phone Number? [phone]  ---------------------------------------------------------------------------  --------------  SCRIPT ANSWERS  Relationship to Patient?  Self

## 2021-01-22 ENCOUNTER — VIRTUAL VISIT (OUTPATIENT)
Dept: PRIMARY CARE CLINIC | Age: 51
End: 2021-01-22
Payer: COMMERCIAL

## 2021-01-22 DIAGNOSIS — R10.9 ACUTE RIGHT FLANK PAIN: ICD-10-CM

## 2021-01-22 PROCEDURE — 99214 OFFICE O/P EST MOD 30 MIN: CPT | Performed by: INTERNAL MEDICINE

## 2021-01-22 RX ORDER — IBUPROFEN 600 MG/1
TABLET ORAL
Qty: 60 TABLET | Refills: 0 | Status: SHIPPED | OUTPATIENT
Start: 2021-01-22 | End: 2021-06-14 | Stop reason: ALTCHOICE

## 2021-01-22 NOTE — PATIENT INSTRUCTIONS
Continue your current medications. Ibuprofen for flank, back and chest discomfort. Your lightheadedness may be related to Covid. Please send results of your recent test to me. Lab review and follow up in the ER if your symptoms worsen. If you do not have Covid, see me next week for an examination.

## 2021-01-22 NOTE — PROGRESS NOTES
Lance Bryan (:  1970) is a 45 y.o. male,Established patient, here for evaluation of the following chief complaint(s): No chief complaint on file. ASSESSMENT/PLAN:  Acute right flank pain  Acute right flank pain. Associated nausea but no vomiting. Denies constipation and appetite is excellent. Also has associated mid lower back pain that radiates to the neck and chest. He was lightheaded earlier and left work to rest at home. He denies recent trauma. His wife currently has Covid        No follow-ups on file. SUBJECTIVE/OBJECTIVE:  HPI    Review of Systems    No flowsheet data found. Physical Exam    [INSTRUCTIONS:  \"[x]\" Indicates a positive item  \"[]\" Indicates a negative item  -- DELETE ALL ITEMS NOT EXAMINED]    Constitutional: [x] Appears well-developed and well-nourished [] No apparent distress  . The patient is distressed and is concerned about Covid. Await test results from yesterday done in an urgent care center.      [] Abnormal -     Mental status: [x] Alert and awake  [x] Oriented to person/place/time [x] Able to follow commands    [] Abnormal -     Eyes:   EOM    [x]  Normal    [] Abnormal -   Sclera  [x]  Normal    [] Abnormal -          Discharge [x]  None visible   [] Abnormal -     HENT: [x] Normocephalic, atraumatic  [] Abnormal -   [x] Mouth/Throat: Mucous membranes are moist    External Ears [x] Normal  [] Abnormal -    Neck: [x] No visualized mass [] Abnormal -     Pulmonary/Chest: [x] Respiratory effort normal   [x] No visualized signs of difficulty breathing or respiratory distress        [] Abnormal -      Musculoskeletal:   [x] Normal gait with no signs of ataxia         [x] Normal range of motion of neck        [] Abnormal -     Neurological:        [x] No Facial Asymmetry (Cranial nerve 7 motor function) (limited exam due to video visit)          [x] No gaze palsy        [] Abnormal - Skin:        [x] No significant exanthematous lesions or discoloration noted on facial skin         [] Abnormal -            Psychiatric:       [x] Normal Affect [] Abnormal -        [x] No Hallucinations    Other pertinent observable physical exam findings:-          On this date 01/22/21 I have spent 30 minutes reviewing previous notes, test results and face to face (virtual) with the patient discussing the diagnosis and importance of compliance with the treatment plan as well as documenting on the day of the visit. Meagan Sandoval is a 45 y.o. male being evaluated by a Virtual Visit (video visit) encounter to address concerns as mentioned above. A caregiver was present when appropriate. Due to this being a TeleHealth encounter (During ZBQUQ-78 public health emergency), evaluation of the following organ systems was limited: Vitals/Constitutional/EENT/Resp/CV/GI//MS/Neuro/Skin/Heme-Lymph-Imm. Pursuant to the emergency declaration under the [address] [place] [address] and the [employer] and [employer] Act, this Virtual Visit was conducted with patient's (and/or legal guardian's) consent, to reduce the patient's risk of exposure to COVID-19 and provide necessary medical care. The patient (and/or legal guardian) has also been advised to contact this office for worsening conditions or problems, and seek emergency medical treatment and/or call 911 if deemed necessary. Patient identification was verified at the start of the visit: Yes    Services were provided through a video synchronous discussion virtually to substitute for in-person clinic visit. Patient was located at home and provider was located in office or at home. An electronic signature was used to authenticate this note.     --Abdiaziz Fisher MD

## 2021-01-22 NOTE — ASSESSMENT & PLAN NOTE
Acute right flank pain. Associated nausea but no vomiting. Denies constipation and appetite is excellent. Also has associated mid lower back pain that radiates to the neck and chest. He was lightheaded earlier and left work to rest at home. He denies recent trauma.  His wife currently has Covid

## 2021-02-02 DIAGNOSIS — R10.9 ACUTE RIGHT FLANK PAIN: ICD-10-CM

## 2021-02-02 DIAGNOSIS — E11.69 DYSLIPIDEMIA ASSOCIATED WITH TYPE 2 DIABETES MELLITUS (HCC): ICD-10-CM

## 2021-02-02 DIAGNOSIS — E78.5 DYSLIPIDEMIA ASSOCIATED WITH TYPE 2 DIABETES MELLITUS (HCC): ICD-10-CM

## 2021-02-02 DIAGNOSIS — E11.9 TYPE 2 DIABETES MELLITUS WITHOUT COMPLICATION, WITHOUT LONG-TERM CURRENT USE OF INSULIN (HCC): Chronic | ICD-10-CM

## 2021-02-02 DIAGNOSIS — I10 ESSENTIAL HYPERTENSION: ICD-10-CM

## 2021-02-02 DIAGNOSIS — E66.01 MORBID OBESITY DUE TO EXCESS CALORIES (HCC): ICD-10-CM

## 2021-02-02 LAB
A/G RATIO: 1.6 (ref 1.1–2.2)
ALBUMIN SERPL-MCNC: 4.6 G/DL (ref 3.4–5)
ALP BLD-CCNC: 46 U/L (ref 40–129)
ALT SERPL-CCNC: 26 U/L (ref 10–40)
ANION GAP SERPL CALCULATED.3IONS-SCNC: 15 MMOL/L (ref 3–16)
AST SERPL-CCNC: 18 U/L (ref 15–37)
BASOPHILS ABSOLUTE: 0 K/UL (ref 0–0.2)
BASOPHILS RELATIVE PERCENT: 0.4 %
BILIRUB SERPL-MCNC: 0.5 MG/DL (ref 0–1)
BILIRUBIN URINE: NEGATIVE
BLOOD, URINE: NEGATIVE
BUN BLDV-MCNC: 10 MG/DL (ref 7–20)
CALCIUM SERPL-MCNC: 9.5 MG/DL (ref 8.3–10.6)
CHLORIDE BLD-SCNC: 99 MMOL/L (ref 99–110)
CLARITY: CLEAR
CO2: 26 MMOL/L (ref 21–32)
COLOR: YELLOW
CREAT SERPL-MCNC: 0.8 MG/DL (ref 0.9–1.3)
EOSINOPHILS ABSOLUTE: 0 K/UL (ref 0–0.6)
EOSINOPHILS RELATIVE PERCENT: 0.4 %
GFR AFRICAN AMERICAN: >60
GFR NON-AFRICAN AMERICAN: >60
GLOBULIN: 2.8 G/DL
GLUCOSE BLD-MCNC: 116 MG/DL (ref 70–99)
GLUCOSE URINE: NEGATIVE MG/DL
HCT VFR BLD CALC: 40.4 % (ref 40.5–52.5)
HEMOGLOBIN: 12.7 G/DL (ref 13.5–17.5)
KETONES, URINE: NEGATIVE MG/DL
LEUKOCYTE ESTERASE, URINE: NEGATIVE
LYMPHOCYTES ABSOLUTE: 2.5 K/UL (ref 1–5.1)
LYMPHOCYTES RELATIVE PERCENT: 36.6 %
MCH RBC QN AUTO: 26.3 PG (ref 26–34)
MCHC RBC AUTO-ENTMCNC: 31.3 G/DL (ref 31–36)
MCV RBC AUTO: 83.9 FL (ref 80–100)
MICROSCOPIC EXAMINATION: NORMAL
MONOCYTES ABSOLUTE: 0.4 K/UL (ref 0–1.3)
MONOCYTES RELATIVE PERCENT: 6.4 %
NEUTROPHILS ABSOLUTE: 3.8 K/UL (ref 1.7–7.7)
NEUTROPHILS RELATIVE PERCENT: 56.2 %
NITRITE, URINE: NEGATIVE
PDW BLD-RTO: 13.8 % (ref 12.4–15.4)
PH UA: 6.5 (ref 5–8)
PLATELET # BLD: 294 K/UL (ref 135–450)
PMV BLD AUTO: 9.1 FL (ref 5–10.5)
POTASSIUM SERPL-SCNC: 4 MMOL/L (ref 3.5–5.1)
PROTEIN UA: NEGATIVE MG/DL
RBC # BLD: 4.81 M/UL (ref 4.2–5.9)
SODIUM BLD-SCNC: 140 MMOL/L (ref 136–145)
SPECIFIC GRAVITY UA: 1.01 (ref 1–1.03)
TOTAL PROTEIN: 7.4 G/DL (ref 6.4–8.2)
URINE TYPE: NORMAL
UROBILINOGEN, URINE: 1 E.U./DL
WBC # BLD: 6.7 K/UL (ref 4–11)

## 2021-02-03 DIAGNOSIS — R74.8 ELEVATED LIVER ENZYMES: Primary | ICD-10-CM

## 2021-02-03 LAB
ESTIMATED AVERAGE GLUCOSE: 174.3 MG/DL
FRUCTOSAMINE: 312 UMOL/L (ref 170–285)
HBA1C MFR BLD: 7.7 %

## 2021-02-09 LAB
HEMOGLOBIN ELECTROPHORESIS: NORMAL
HGB ELECTROPHORESIS INTERP: NORMAL

## 2021-02-15 ENCOUNTER — VIRTUAL VISIT (OUTPATIENT)
Dept: ENDOCRINOLOGY | Age: 51
End: 2021-02-15
Payer: COMMERCIAL

## 2021-02-15 DIAGNOSIS — E11.69 DYSLIPIDEMIA ASSOCIATED WITH TYPE 2 DIABETES MELLITUS (HCC): ICD-10-CM

## 2021-02-15 DIAGNOSIS — E11.9 TYPE 2 DIABETES MELLITUS WITHOUT COMPLICATION, WITHOUT LONG-TERM CURRENT USE OF INSULIN (HCC): Chronic | ICD-10-CM

## 2021-02-15 DIAGNOSIS — I10 ESSENTIAL HYPERTENSION: ICD-10-CM

## 2021-02-15 DIAGNOSIS — D57.3 SICKLE CELL TRAIT (HCC): ICD-10-CM

## 2021-02-15 DIAGNOSIS — E66.01 MORBID OBESITY DUE TO EXCESS CALORIES (HCC): ICD-10-CM

## 2021-02-15 DIAGNOSIS — Z79.899 HIGH RISK MEDICATION USE: ICD-10-CM

## 2021-02-15 DIAGNOSIS — E78.5 DYSLIPIDEMIA ASSOCIATED WITH TYPE 2 DIABETES MELLITUS (HCC): ICD-10-CM

## 2021-02-15 PROCEDURE — 99215 OFFICE O/P EST HI 40 MIN: CPT | Performed by: INTERNAL MEDICINE

## 2021-02-15 PROCEDURE — 3051F HG A1C>EQUAL 7.0%<8.0%: CPT | Performed by: INTERNAL MEDICINE

## 2021-02-15 RX ORDER — ATORVASTATIN CALCIUM 40 MG/1
TABLET, FILM COATED ORAL
Qty: 90 TABLET | Refills: 3 | Status: SHIPPED | OUTPATIENT
Start: 2021-02-15 | End: 2022-04-04

## 2021-02-15 NOTE — PROGRESS NOTES
Patient ID:   Cherelle Leahy is a 45 y.o. male    Chief Complaint:   Cherelle eLahy presents for an evaluation of Type 2 Diabetes Mellitus , Hyperlipidemia and hypertension. Pursuant to the emergency declaration under the [address] [facility] [place], [facility] and the [employer] Act this visit was conducted after obtaining verbal consent, with the use of [employer]. me interactive audio and video telecommunications system that permits real time communication between the patient and provider to substitute for an in-person clinic visit to reduce the patient's risk of exposure to COVID-19 and provide necessary medical care. Because this was a Telehealth visit, evaluation of the following organ systems was limited: Vitals, Constitutional, EENT, Resp, CV, GI, , MS, Neuro, Skin. Heme. Lymph, Imm. Cherelle Leahy was at home. Provider was at [facility] office. No one else was involved. The patient has also been advised to contact this office for worsening conditions or problems, and seek emergency medical treatment and/or call 911 if deemed necessary. Subjective:   Type 2 Diabetes Mellitus diagnosed in 2014  On insulin since APRIL 7189   Trulicity caused severe GI side effects   Invokana caused urinary issues      Metformin 1000MG bid. Missing some even doses   Toujeo 30 units daily at 6am. No TTT. Working on diet (mor evegetable, fruits, baking), exercise , cutting down alcohol     BS Readings:    2/14  AM- 146  PM- 144    2/13  AM- 128  PM-108    2/12  AM- 97  PM- 139    2/11  AM-172  PM- 126    2/10  AM-157  PM- 165    2/9  AM- 133  PM-191    2/8  AM-174  PM-134    Checks blood sugars 2 times per day. Reportedly as above  AM:      Lunch:   Supper:   HS:      Hypoglycemias: None     Meals Three dinner is bigger. Chips 2 bags per day, 90 osbaldo each. No sodas, some juice or regular tea.    Exercise: Walking at plant, may be 2 miles per day. Bike around the neighborhood twice a week. 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      Family history of CAD: sister  of CAD at age 61   Denies smoking   On low dose Aspirin    The following portions of the patient's history were reviewed and updated as appropriate:       Family History   Problem Relation Age of Onset    Diabetes Mother     Diabetes Father     Diabetes Sister     Diabetes Brother          Social History     Socioeconomic History    Marital status: Single     Spouse name: Not on file    Number of children: Not on file    Years of education: Not on file    Highest education level: Not on file   Occupational History    Not on file   Social Needs    Financial resource strain: Not on file    Food insecurity     Worry: Not on file     Inability: Not on file    Transportation needs     Medical: Not on file     Non-medical: Not on file   Tobacco Use    Smoking status: Never Smoker    Smokeless tobacco: Never Used   Substance and Sexual Activity    Alcohol use: Yes     Comment: social    Drug use: No    Sexual activity: Yes   Lifestyle    Physical activity     Days per week: Not on file     Minutes per session: Not on file    Stress: Not on file   Relationships    Social connections     Talks on phone: Not on file     Gets together: Not on file     Attends [employer] service: Not on file     Active member of club or organization: Not on file     Attends meetings of clubs or organizations: Not on file     Relationship status: Not on file    Intimate partner violence     Fear of current or ex partner: Not on file     Emotionally abused: Not on file     Physically abused: Not on file     Forced sexual activity: Not on file   Other Topics Concern    Not on file   Social History Narrative    Not on file       Past Medical History:   Diagnosis Date    Diabetes mellitus ([facility] [address]     ED (erectile dysfunction) 1/31/2013    Essential hypertension 9/10/2018    Hyperlipidemia LDL goal < 100 1/31/2013    Morbid obesity due to excess calories ([facility] [address] 11/19/2018       History reviewed. No pertinent surgical history. Allergies   Allergen Reactions    Food      shrimp         Current Outpatient Medications:     atorvastatin (LIPITOR) 40 MG tablet, TAKE 1 TABLET BY MOUTH ONCE DAILY, Disp: 90 tablet, Rfl: 3    ibuprofen (ADVIL;MOTRIN) 600 MG tablet, Take one tablet two to 3 times daily as needed for relief of right flank, back and neck pain., Disp: 60 tablet, Rfl: 0    sildenafil (VIAGRA) 100 MG tablet, TAKE 1 TABLET BY MOUTH AS NEEDED FOR ERECTILE DYSFUNCTION, Disp: 10 tablet, Rfl: 5    TOUJEO SOLOSTAR 300 UNIT/ML SOPN, INJECT 34 UNITS SUBCUTANEOUSLY IN AM (Patient taking differently: INJECT 30 UNITS SUBCUTANEOUSLY IN AM), Disp: 18 pen, Rfl: 0    metFORMIN (GLUCOPHAGE) 1000 MG tablet, One tab twice a day with food, Disp: 180 tablet, Rfl: 0    aspirin 81 MG tablet, Take 81 mg by mouth daily, Disp: , Rfl:     Insulin Pen Needle 31G X 8 MM MISC, 1 each by Does not apply route daily, Disp: 100 each, Rfl: 3    Lancets MISC, Test once daily, Disp: 50 each, Rfl: 3    sildenafil (VIAGRA) 100 MG tablet, Take 1 tablet by mouth as needed for Erectile Dysfunction, Disp: 10 tablet, Rfl: 1      Review of Systems:    Constitutional: Negative for fever, chills, and unexpected weight change. HENT: Negative for congestion, ear pain, rhinorrhea,  sore throat and trouble swallowing. Eyes: Negative for photophobia, redness, itching. Respiratory: Negative for cough, shortness of breath and sputum. Cardiovascular: Negative for chest pain, palpitations and leg swelling. Gastrointestinal: Negative for nausea, vomiting, abdominal pain, diarrhea, constipation. Endocrine: Negative for cold intolerance, heat intolerance, polydipsia, polyphagia and polyuria.    Genitourinary: Negative for dysuria, urgency, frequency, hematuria and flank pain. Musculoskeletal: Negative for myalgias, back pain, arthralgias and neck pain. Skin/Nail: Negative for rash, itching. Normal nails. Neurological: Negative for seizures, weakness, light-headedness, numbness and headaches. Hematological/ Lymph nodes: Negative for adenopathy. Does not bruise/bleed easily. Psychiatric/Behavioral: Negative for suicidal ideas, depression, anxiety, sleep disturbance and decreased concentration. Objective:   Physical Exam:  There were no vitals taken for this visit. Constitutional: Patient is oriented to person, place, and time. Patient appears well-developed and well-nourished. Pulmonary/Chest: Effort normal.   Neurological: Patient is alert and oriented to person, place, and time. Psychiatric: Patient has a normal mood and affect.  Patient behavior is normal.     Lab Review:    Orders Only on 02/02/2021   Component Date Value Ref Range Status    Sodium 02/02/2021 140  136 - 145 mmol/L Final    Potassium 02/02/2021 4.0  3.5 - 5.1 mmol/L Final    Chloride 02/02/2021 99  99 - 110 mmol/L Final    CO2 02/02/2021 26  21 - 32 mmol/L Final    Anion Gap 02/02/2021 15  3 - 16 Final    Glucose 02/02/2021 116* 70 - 99 mg/dL Final    BUN 02/02/2021 10  7 - 20 mg/dL Final    CREATININE 02/02/2021 0.8* 0.9 - 1.3 mg/dL Final    GFR Non- 02/02/2021 >60  >60 Final    GFR  02/02/2021 >60  >60 Final    Calcium 02/02/2021 9.5  8.3 - 10.6 mg/dL Final    Total Protein 02/02/2021 7.4  6.4 - 8.2 g/dL Final    Albumin 02/02/2021 4.6  3.4 - 5.0 g/dL Final    Albumin/Globulin Ratio 02/02/2021 1.6  1.1 - 2.2 Final    Total Bilirubin 02/02/2021 0.5  0.0 - 1.0 mg/dL Final    Alkaline Phosphatase 02/02/2021 46  40 - 129 U/L Final    ALT 02/02/2021 26  10 - 40 U/L Final    AST 02/02/2021 18  15 - 37 U/L Final    Globulin 02/02/2021 2.8  g/dL Final    WBC 02/02/2021 6.7  4.0 - 11.0 K/uL Final    RBC 02/02/2021 4.81  4.20 - 5.90 M/uL Final    Hemoglobin 02/02/2021 12.7* 13.5 - 17.5 g/dL Final    Hematocrit 02/02/2021 40.4* 40.5 - 52.5 % Final    MCV 02/02/2021 83.9  80.0 - 100.0 fL Final    MCH 02/02/2021 26.3  26.0 - 34.0 pg Final    MCHC 02/02/2021 31.3  31.0 - 36.0 g/dL Final    RDW 02/02/2021 13.8  12.4 - 15.4 % Final    Platelets 63/67/8060 294  135 - 450 K/uL Final    MPV 02/02/2021 9.1  5.0 - 10.5 fL Final    Neutrophils % 02/02/2021 56.2  % Final    Lymphocytes % 02/02/2021 36.6  % Final    Monocytes % 02/02/2021 6.4  % Final    Eosinophils % 02/02/2021 0.4  % Final    Basophils % 02/02/2021 0.4  % Final    Neutrophils Absolute 02/02/2021 3.8  1.7 - 7.7 K/uL Final    Lymphocytes Absolute 02/02/2021 2.5  1.0 - 5.1 K/uL Final    Monocytes Absolute 02/02/2021 0.4  0.0 - 1.3 K/uL Final    Eosinophils Absolute 02/02/2021 0.0  0.0 - 0.6 K/uL Final    Basophils Absolute 02/02/2021 0.0  0.0 - 0.2 K/uL Final    Color, UA 02/02/2021 YELLOW  Straw/Yellow Final    Clarity, UA 02/02/2021 Clear  Clear Final    Glucose, Ur 02/02/2021 Negative  Negative mg/dL Final    Bilirubin Urine 02/02/2021 Negative  Negative Final    Ketones, Urine 02/02/2021 Negative  Negative mg/dL Final    Specific Gravity, UA 02/02/2021 1.015  1.005 - 1.030 Final    Blood, Urine 02/02/2021 Negative  Negative Final    pH, UA 02/02/2021 6.5  5.0 - 8.0 Final    Protein, UA 02/02/2021 Negative  Negative mg/dL Final    Urobilinogen, Urine 02/02/2021 1.0  <2.0 E.U./dL Final    Nitrite, Urine 02/02/2021 Negative  Negative Final    Leukocyte Esterase, Urine 02/02/2021 Negative  Negative Final    Microscopic Examination 02/02/2021 Not Indicated   Final    Urine Type 02/02/2021 Voided   Final    Hemoglobin, Elp 02/02/2021 See Comment   Final    Fructosamine 02/02/2021 312* 170 - 285 umol/L Final    Hemoglobin A1C 02/02/2021 7.7  See comment % Final    eAG 02/02/2021 174.3  mg/dL Final    Interpretation + ELECTROPHORESIS, * 02/02/2021 REVIEWED   Final Orders Only on 11/03/2020   Component Date Value Ref Range Status    Hemoglobin A1C 11/03/2020 9.4  See comment % Final    eAG 11/03/2020 223.1  mg/dL Final    Microalbumin, Random Urine 11/03/2020 7.70* <2.0 mg/dL Final    Creatinine, Ur 11/03/2020 129.1  39.0 - 259.0 mg/dL Final    Microalbumin Creatinine Ratio 11/03/2020 59.6* 0.0 - 30.0 mg/g Final   Orders Only on 07/29/2020   Component Date Value Ref Range Status    Microalbumin, Random Urine 07/29/2020 4.60* <2.0 mg/dL Final    Creatinine, Ur 07/29/2020 107.2  39.0 - 259.0 mg/dL Final    Microalbumin Creatinine Ratio 07/29/2020 42.9* 0.0 - 30.0 mg/g Final    Hemoglobin A1C 07/29/2020 10.4  See comment % Final    eAG 07/29/2020 251.8  mg/dL Final   Office Visit on 07/10/2020   Component Date Value Ref Range Status    SARS-CoV-2 07/10/2020 Not Detected  Not Detected Final    Source 07/10/2020 NP swab   Final   Orders Only on 04/09/2020   Component Date Value Ref Range Status    Microalbumin, Random Urine 04/09/2020 5.70* <2.0 mg/dL Final    Creatinine, Ur 04/09/2020 88.2  39.0 - 259.0 mg/dL Final    Microalbumin Creatinine Ratio 04/09/2020 64.6* 0.0 - 30.0 mg/g Final    Sodium 04/09/2020 140  136 - 145 mmol/L Final    Potassium 04/09/2020 4.7  3.5 - 5.1 mmol/L Final    Chloride 04/09/2020 101  99 - 110 mmol/L Final    CO2 04/09/2020 27  21 - 32 mmol/L Final    Anion Gap 04/09/2020 12  3 - 16 Final    Glucose 04/09/2020 278* 70 - 99 mg/dL Final    BUN 04/09/2020 15  7 - 20 mg/dL Final    CREATININE 04/09/2020 0.8* 0.9 - 1.3 mg/dL Final    GFR Non- 04/09/2020 >60  >60 Final    GFR  04/09/2020 >60  >60 Final    Calcium 04/09/2020 9.7  8.3 - 10.6 mg/dL Final    Total Protein 04/09/2020 7.3  6.4 - 8.2 g/dL Final    Albumin 04/09/2020 4.5  3.4 - 5.0 g/dL Final    Albumin/Globulin Ratio 04/09/2020 1.6  1.1 - 2.2 Final    Total Bilirubin 04/09/2020 <0.2  0.0 - 1.0 mg/dL Final    Alkaline Phosphatase 04/09/2020 50  40 - 129 U/L Final    ALT 04/09/2020 52* 10 - 40 U/L Final    AST 04/09/2020 20  15 - 37 U/L Final    Globulin 04/09/2020 2.8  g/dL Final    Hemoglobin A1C 04/09/2020 10.5  See comment % Final    eAG 04/09/2020 254.7  mg/dL Final           Assessment and Plan     Marques Haro was seen today for follow-up and diabetes. Diagnoses and all orders for this visit:    Uncontrolled type 2 diabetes mellitus with microalbuminuria, with long-term current use of insulin ([facility])  -     atorvastatin (LIPITOR) 40 MG tablet; TAKE 1 TABLET BY MOUTH ONCE DAILY  -     Hemoglobin A1C; Future  -     Fructosamine; Future  -     Lipid, Fasting; Future  -     Microalbumin / Creatinine Urine Ratio; Future    Dyslipidemia associated with type 2 diabetes mellitus ([facility])  -     atorvastatin (LIPITOR) 40 MG tablet; TAKE 1 TABLET BY MOUTH ONCE DAILY  -     Hemoglobin A1C; Future  -     Fructosamine; Future  -     Lipid, Fasting; Future  -     Microalbumin / Creatinine Urine Ratio; Future    Type 2 diabetes mellitus without complication, without long-term current use of insulin ([facility])  -     atorvastatin (LIPITOR) 40 MG tablet; TAKE 1 TABLET BY MOUTH ONCE DAILY  -     Hemoglobin A1C; Future  -     Fructosamine; Future  -     Lipid, Fasting; Future  -     Microalbumin / Creatinine Urine Ratio; Future    Essential hypertension  -     atorvastatin (LIPITOR) 40 MG tablet; TAKE 1 TABLET BY MOUTH ONCE DAILY  -     Hemoglobin A1C; Future  -     Fructosamine; Future  -     Lipid, Fasting; Future  -     Microalbumin / Creatinine Urine Ratio; Future    Morbid obesity due to excess calories ([facility])  -     atorvastatin (LIPITOR) 40 MG tablet; TAKE 1 TABLET BY MOUTH ONCE DAILY  -     Hemoglobin A1C; Future  -     Fructosamine; Future  -     Lipid, Fasting; Future  -     Microalbumin / Creatinine Urine Ratio;  Future    Sickle cell trait ([facility])    High risk medication use          1: Type 2 DM complicated with nephropathy   Uncontrolled A1C 7.7% < 9.4% < 10.4 %< 10.5% < 7.8% < 7.6% < 9% < 8.9 %< 10.9%      Fructosamine 312 eq to A1C of 7.7%    Blood sugars are better than A1C . Feb 2021: An abnormal band is present in the \"S\" position on both the   alkaline and acid gels, accounting for 36.9% of the total hemoglobin, consistent with sickle cell trait. Genetic counseling done     DM control has improved with life style change s  Keep working     Metformin 1000MG BID breakfast and lunch   C/w Toujeo 30 units daily at 6am     Need to cut down beer     All instructions provided in written. Check Blood sugars 2-3 times per day. Log them along with insulin and send them every 2 weeks. Call for blood sugars less than 60 or more than 400. Eye exam: Last exam in April 2019, markosies  Foot exam: Sep 2019    Deformity/amputation: absent  Skin lesions/pre-ulcerative calluses: absent  Edema: right- negative, left- negative  Sensory exam: Monofilament sensation: normal  Pulses: normal, Vibration (128 Hz): Intact    Renal screen: 88, Jan 2020 > 64 April 2020 > 43 July 2020 > 59 Nov 2020   For proteinuria, need to control DM and lose weight. TSH screen: Jan 2017     2: HTN   Controlled with out meds   May add lisinopril for continued proteinuria   Also need to improve diabetes control     3: Hyperlipidemia   LDL: 41, HDL: 43, TGs: 119 Jan 2020     Atorvastatin 40mg daily     4: Morbid obesity   Need to work on diet, exercise and lose weight       RTC in 3 months A1C, Fructosamine, lipids, MACr     EDUCATION:   Greater than 50% of this follow-up visit was spent in general counseling regarding   obesity, diet, exercise, importance of adherence to insulin regime, recognition and treatment of hypo and hyperglycemia,  glucose logging, proper diabetes management, diabetic complications with poor management and the importance of glycemic control in order to avoid the complications of diabetes.     Risks and potential complications of diabetes were reviewed with the patient. Diabetes health maintenance plan and follow-up were discussed and understood by the patient. We reviewed the importance of medication compliance and regular follow-up. Aggressive lifestyle modification was encouraged. Exercise Counselling: This patient is a candidate for regular physical exercise. Instructions to perform the following types of exercise:  Swimming or water aerobic exercise  Brisk walking  Playing tennis  Stationary bicycle or elliptical indoor  Low impact aerobic exercise    Instructions given to exercise for the following duration:  30 minutes a day for five-seven days per week.     Following instructions for being active throughout the day in addition to formal exercise:  Walk instead of drive whenever possible  Take the stairs instead of the elevator  Work in the garden  Park to the far end of the parking lot to add more walking steps to destination      Electronically signed by Maria Teresa Doherty MD on 2/15/2021 at 11:31 AM

## 2021-02-17 ENCOUNTER — TELEPHONE (OUTPATIENT)
Dept: ENDOCRINOLOGY | Age: 51
End: 2021-02-17

## 2021-02-17 NOTE — TELEPHONE ENCOUNTER
Mr. Ledbetter Erp informed per Dr. Harish Tello response \"  I am not aware of any benefit. He can try it and see if it helps. Per conversation in Sept regarding same question.

## 2021-03-05 ENCOUNTER — TELEPHONE (OUTPATIENT)
Dept: PRIMARY CARE CLINIC | Age: 51
End: 2021-03-05

## 2021-03-05 RX ORDER — HYDROXYZINE HYDROCHLORIDE 25 MG/1
25 TABLET, FILM COATED ORAL EVERY 6 HOURS PRN
Qty: 30 TABLET | Refills: 1 | Status: SHIPPED | OUTPATIENT
Start: 2021-03-05 | End: 2021-10-20 | Stop reason: SDUPTHER

## 2021-03-05 NOTE — TELEPHONE ENCOUNTER
----- Message from Ayesha Kaur sent at 3/5/2021  9:57 AM EST -----  Subject: Message to Provider    QUESTIONS  Information for Provider? Pt of Dr Alannah Clark was requesting refill for   Oxycortisone (said it is a pill for itching); I could not find it in his   list of meds. Please give pt a call. Thanks!  ---------------------------------------------------------------------------  --------------  CALL BACK INFO  What is the best way for the office to contact you? OK to leave message on   voicemail  Preferred Call Back Phone Number? [phone]  ---------------------------------------------------------------------------  --------------  SCRIPT ANSWERS  Relationship to Patient?  Self

## 2021-06-09 DIAGNOSIS — E11.69 DYSLIPIDEMIA ASSOCIATED WITH TYPE 2 DIABETES MELLITUS (HCC): ICD-10-CM

## 2021-06-09 DIAGNOSIS — I10 ESSENTIAL HYPERTENSION: ICD-10-CM

## 2021-06-09 DIAGNOSIS — E66.01 MORBID OBESITY DUE TO EXCESS CALORIES (HCC): ICD-10-CM

## 2021-06-09 DIAGNOSIS — E11.9 TYPE 2 DIABETES MELLITUS WITHOUT COMPLICATION, WITHOUT LONG-TERM CURRENT USE OF INSULIN (HCC): Chronic | ICD-10-CM

## 2021-06-09 DIAGNOSIS — R74.8 ELEVATED LIVER ENZYMES: ICD-10-CM

## 2021-06-09 DIAGNOSIS — E78.5 DYSLIPIDEMIA ASSOCIATED WITH TYPE 2 DIABETES MELLITUS (HCC): ICD-10-CM

## 2021-06-09 LAB
CHOLESTEROL, FASTING: 121 MG/DL (ref 0–199)
CREATININE URINE: 145.1 MG/DL (ref 39–259)
HDLC SERPL-MCNC: 35 MG/DL (ref 40–60)
HEPATITIS C ANTIBODY INTERPRETATION: NORMAL
LDL CHOLESTEROL CALCULATED: 42 MG/DL
MICROALBUMIN UR-MCNC: 8.9 MG/DL
MICROALBUMIN/CREAT UR-RTO: 61.3 MG/G (ref 0–30)
TRIGLYCERIDE, FASTING: 218 MG/DL (ref 0–150)
VLDLC SERPL CALC-MCNC: 44 MG/DL

## 2021-06-10 ENCOUNTER — TELEPHONE (OUTPATIENT)
Dept: PRIMARY CARE CLINIC | Age: 51
End: 2021-06-10

## 2021-06-10 DIAGNOSIS — Z12.11 SCREENING FOR COLON CANCER: Primary | ICD-10-CM

## 2021-06-10 LAB
ESTIMATED AVERAGE GLUCOSE: 223.1 MG/DL
HBA1C MFR BLD: 9.4 %

## 2021-06-11 LAB — FRUCTOSAMINE: 373 UMOL/L (ref 170–285)

## 2021-06-14 ENCOUNTER — VIRTUAL VISIT (OUTPATIENT)
Dept: ENDOCRINOLOGY | Age: 51
End: 2021-06-14
Payer: COMMERCIAL

## 2021-06-14 DIAGNOSIS — E78.5 DYSLIPIDEMIA ASSOCIATED WITH TYPE 2 DIABETES MELLITUS (HCC): ICD-10-CM

## 2021-06-14 DIAGNOSIS — I10 ESSENTIAL HYPERTENSION: ICD-10-CM

## 2021-06-14 DIAGNOSIS — E66.01 MORBID OBESITY DUE TO EXCESS CALORIES (HCC): ICD-10-CM

## 2021-06-14 DIAGNOSIS — E11.69 DYSLIPIDEMIA ASSOCIATED WITH TYPE 2 DIABETES MELLITUS (HCC): ICD-10-CM

## 2021-06-14 DIAGNOSIS — E11.9 TYPE 2 DIABETES MELLITUS WITHOUT COMPLICATION, WITHOUT LONG-TERM CURRENT USE OF INSULIN (HCC): Chronic | ICD-10-CM

## 2021-06-14 PROCEDURE — 99214 OFFICE O/P EST MOD 30 MIN: CPT | Performed by: INTERNAL MEDICINE

## 2021-06-14 NOTE — PROGRESS NOTES
Patient ID:   Mere Pimentel is a 45 y.o. male    Chief Complaint:   Mere Pimentel presents for an evaluation of Type 2 Diabetes Mellitus , Hyperlipidemia and hypertension. Pursuant to the emergency declaration under the [facility], [address] [place] [address] and the [employer] General Act this visit was conducted after obtaining verbal consent, with the use of D[employer]. me interactive audio and video telecommunications system that permits real time communication between the patient and provider to substitute for an in-person clinic visit to reduce the patient's risk of exposure to COVID-19 and provide necessary medical care. Because this was a Telehealth visit, evaluation of the following organ systems was limited: Vitals, Constitutional, EENT, Resp, CV, GI, , MS, Neuro, Skin. Heme. Lymph, Imm. Mere Pimentel was at home. [facility]. No one else was involved. The patient has also been advised to contact this office for worsening conditions or problems, and seek emergency medical treatment and/or call 911 if deemed necessary. Subjective:   Type 2 Diabetes Mellitus diagnosed in 2014  On insulin since APRIL 2297   Trulicity caused severe GI side effects   Invokana caused urinary issues      Metformin 1000MG bid. Missing some even doses   Toujeo 30 units daily at 6am. No TTT. Recently joined [employer]. He goes to school after work. Eating erratic. A lot of poor choices as per him. Working on diet (mor evegetable, fruits, baking), exercise , cutting down alcohol        Checks blood sugars 2 times per day. Reportedly 's   AM:       Lunch:   Supper:   HS:      Hypoglycemias: None     Meals Three dinner is bigger. Chips 2 bags per day, 90 osbaldo each. No sodas, some juice or regular tea. Exercise: Walking at plant, may be 2 miles per day. Bike around the neighborhood twice a week. 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 Family history of CAD: sister  of CAD at age 61   Denies smoking   On low dose Aspirin    The following portions of the patient's history were reviewed and updated as appropriate:       Family History   Problem Relation Age of Onset    Diabetes Mother     Diabetes Father     Diabetes Sister     Diabetes Brother          Social History     Socioeconomic History    Marital status: Single     Spouse name: Not on file    Number of children: Not on file    Years of education: Not on file    Highest education level: Not on file   Occupational History    Not on file   Tobacco Use    Smoking status: Never Smoker    Smokeless tobacco: Never Used   Vaping Use    Vaping Use: Never used   Substance and Sexual Activity    Alcohol use: Yes     Comment: social    Drug use: No    Sexual activity: Yes   Other Topics Concern    Not on file   Social History Narrative    Not on file     Social Determinants of Health     Financial Resource Strain:     Difficulty of Paying Living Expenses:    Food Insecurity:     Worried About Running Out of Food in the Last Year:     Ran Out of Food in the Last Year:    Transportation Needs:     Lack of Transportation (Medical):      Lack of Transportation (Non-Medical):    Physical Activity:     Days of Exercise per Week:     Minutes of Exercise per Session:    Stress:     Feeling of Stress :    Social Connections:     Frequency of Communication with Friends and Family:     Frequency of Social Gatherings with Friends and Family:     Attends Jehovah's witness Services:     Active Member of Clubs or Organizations:     Attends Club or Organization Meetings:     Marital Status:    Intimate Partner Violence:     Fear of Current or Ex-Partner:     Emotionally Abused:     Physically Abused:     Sexually Abused:        Past Medical History:   Diagnosis Date    [facility]  ED (erectile dysfunction) 1/31/2013    Essential hypertension 9/10/2018    Hyperlipidemia LDL goal < 100 1/31/2013    Morbid obesity due to excess calories ([address] 11/19/2018       History reviewed. No pertinent surgical history. Allergies   Allergen Reactions    Food      shrimp         Current Outpatient Medications:     metFORMIN (GLUCOPHAGE) 1000 MG tablet, Take 1 tablet by mouth twice daily with food, Disp: 180 tablet, Rfl: 3    atorvastatin (LIPITOR) 40 MG tablet, TAKE 1 TABLET BY MOUTH ONCE DAILY, Disp: 90 tablet, Rfl: 3    sildenafil (VIAGRA) 100 MG tablet, TAKE 1 TABLET BY MOUTH AS NEEDED FOR ERECTILE DYSFUNCTION, Disp: 10 tablet, Rfl: 5    TOUJEO SOLOSTAR 300 UNIT/ML SOPN, INJECT 34 UNITS SUBCUTANEOUSLY IN AM (Patient taking differently: INJECT 30 UNITS SUBCUTANEOUSLY IN AM), Disp: 18 pen, Rfl: 0    aspirin 81 MG tablet, Take 81 mg by mouth daily, Disp: , Rfl:     Insulin Pen Needle 31G X 8 MM MISC, 1 each by Does not apply route daily, Disp: 100 each, Rfl: 3    Lancets MISC, Test once daily, Disp: 50 each, Rfl: 3    sildenafil (VIAGRA) 100 MG tablet, Take 1 tablet by mouth as needed for Erectile Dysfunction, Disp: 10 tablet, Rfl: 1      Review of Systems:    Constitutional: Negative for fever, chills, and unexpected weight change. HENT: Negative for congestion, ear pain, rhinorrhea,  sore throat and trouble swallowing. Eyes: Negative for photophobia, redness, itching. Respiratory: Negative for cough, shortness of breath and sputum. Cardiovascular: Negative for chest pain, palpitations and leg swelling. Gastrointestinal: Negative for nausea, vomiting, abdominal pain, diarrhea, constipation. Endocrine: Negative for cold intolerance, heat intolerance, polydipsia, polyphagia and polyuria. Genitourinary: Negative for dysuria, urgency, frequency, hematuria and flank pain. Musculoskeletal: Negative for myalgias, back pain, arthralgias and neck pain.    Skin/Nail: Negative for rash, itching. Normal nails. Neurological: Negative for seizures, weakness, light-headedness, numbness and headaches. Hematological/ Lymph nodes: Negative for adenopathy. Does not bruise/bleed easily. Psychiatric/Behavioral: Negative for suicidal ideas, depression, anxiety, sleep disturbance and decreased concentration. Objective:   Physical Exam:  There were no vitals taken for this visit. Constitutional: Patient is oriented to person, place, and time. Patient appears well-developed and well-nourished. Pulmonary/Chest: Effort normal.   Neurological: Patient is alert and oriented to person, place, and time. Psychiatric: Patient has a normal mood and affect.  Patient behavior is normal.     Lab Review:    Orders Only on 06/09/2021   Component Date Value Ref Range Status    Hep C Ab Interp 06/09/2021 Non-reactive  Non-reactive Final   Orders Only on 06/09/2021   Component Date Value Ref Range Status    Microalbumin, Random Urine 06/09/2021 8.90* <2.0 mg/dL Final    Creatinine, Ur 06/09/2021 145.1  39.0 - 259.0 mg/dL Final    Microalbumin Creatinine Ratio 06/09/2021 61.3* 0.0 - 30.0 mg/g Final    Cholesterol, Fasting 06/09/2021 121  0 - 199 mg/dL Final    Triglyceride, Fasting 06/09/2021 218* 0 - 150 mg/dL Final    HDL 06/09/2021 35* 40 - 60 mg/dL Final    LDL Calculated 06/09/2021 42  <100 mg/dL Final    VLDL Cholesterol Calculated 06/09/2021 44  Not Established mg/dL Final    Fructosamine 06/09/2021 373* 170 - 285 umol/L Final    Hemoglobin A1C 06/09/2021 9.4  See comment % Final    eAG 06/09/2021 223.1  mg/dL Final   Orders Only on 02/02/2021   Component Date Value Ref Range Status    Sodium 02/02/2021 140  136 - 145 mmol/L Final    Potassium 02/02/2021 4.0  3.5 - 5.1 mmol/L Final    Chloride 02/02/2021 99  99 - 110 mmol/L Final    CO2 02/02/2021 26  21 - 32 mmol/L Final    Anion Gap 02/02/2021 15  3 - 16 Final    Glucose 02/02/2021 116* 70 - 99 mg/dL Final    BUN 02/02/2021 10  7 - 20 mg/dL Final    CREATININE 02/02/2021 0.8* 0.9 - 1.3 mg/dL Final    GFR Non- 02/02/2021 >60  >60 Final    GFR  02/02/2021 >60  >60 Final    Calcium 02/02/2021 9.5  8.3 - 10.6 mg/dL Final    Total Protein 02/02/2021 7.4  6.4 - 8.2 g/dL Final    Albumin 02/02/2021 4.6  3.4 - 5.0 g/dL Final    Albumin/Globulin Ratio 02/02/2021 1.6  1.1 - 2.2 Final    Total Bilirubin 02/02/2021 0.5  0.0 - 1.0 mg/dL Final    Alkaline Phosphatase 02/02/2021 46  40 - 129 U/L Final    ALT 02/02/2021 26  10 - 40 U/L Final    AST 02/02/2021 18  15 - 37 U/L Final    Globulin 02/02/2021 2.8  g/dL Final    WBC 02/02/2021 6.7  4.0 - 11.0 K/uL Final    RBC 02/02/2021 4.81  4.20 - 5.90 M/uL Final    Hemoglobin 02/02/2021 12.7* 13.5 - 17.5 g/dL Final    Hematocrit 02/02/2021 40.4* 40.5 - 52.5 % Final    MCV 02/02/2021 83.9  80.0 - 100.0 fL Final    MCH 02/02/2021 26.3  26.0 - 34.0 pg Final    MCHC 02/02/2021 31.3  31.0 - 36.0 g/dL Final    RDW 02/02/2021 13.8  12.4 - 15.4 % Final    Platelets 07/03/6224 294  135 - 450 K/uL Final    MPV 02/02/2021 9.1  5.0 - 10.5 fL Final    Neutrophils % 02/02/2021 56.2  % Final    Lymphocytes % 02/02/2021 36.6  % Final    Monocytes % 02/02/2021 6.4  % Final    Eosinophils % 02/02/2021 0.4  % Final    Basophils % 02/02/2021 0.4  % Final    Neutrophils Absolute 02/02/2021 3.8  1.7 - 7.7 K/uL Final    Lymphocytes Absolute 02/02/2021 2.5  1.0 - 5.1 K/uL Final    Monocytes Absolute 02/02/2021 0.4  0.0 - 1.3 K/uL Final    Eosinophils Absolute 02/02/2021 0.0  0.0 - 0.6 K/uL Final    Basophils Absolute 02/02/2021 0.0  0.0 - 0.2 K/uL Final    Color, UA 02/02/2021 YELLOW  Straw/Yellow Final    Clarity, UA 02/02/2021 Clear  Clear Final    Glucose, Ur 02/02/2021 Negative  Negative mg/dL Final    Bilirubin Urine 02/02/2021 Negative  Negative Final    Ketones, Urine 02/02/2021 Negative  Negative mg/dL Final    Specific Gravity, UA 02/02/2021 1.015 1.005 - 1.030 Final    Blood, Urine 02/02/2021 Negative  Negative Final    pH, UA 02/02/2021 6.5  5.0 - 8.0 Final    Protein, UA 02/02/2021 Negative  Negative mg/dL Final    Urobilinogen, Urine 02/02/2021 1.0  <2.0 E.U./dL Final    Nitrite, Urine 02/02/2021 Negative  Negative Final    Leukocyte Esterase, Urine 02/02/2021 Negative  Negative Final    Microscopic Examination 02/02/2021 Not Indicated   Final    Urine Type 02/02/2021 Voided   Final    Hemoglobin, Elp 02/02/2021 See Comment   Final    Fructosamine 02/02/2021 312* 170 - 285 umol/L Final    Hemoglobin A1C 02/02/2021 7.7  See comment % Final    eAG 02/02/2021 174.3  mg/dL Final    Interpretation + ELECTROPHORESIS, * 02/02/2021 REVIEWED   Final   Orders Only on 11/03/2020   Component Date Value Ref Range Status    Hemoglobin A1C 11/03/2020 9.4  See comment % Final    eAG 11/03/2020 223.1  mg/dL Final    Microalbumin, Random Urine 11/03/2020 7.70* <2.0 mg/dL Final    Creatinine, Ur 11/03/2020 129.1  39.0 - 259.0 mg/dL Final    Microalbumin Creatinine Ratio 11/03/2020 59.6* 0.0 - 30.0 mg/g Final   Orders Only on 07/29/2020   Component Date Value Ref Range Status    Microalbumin, Random Urine 07/29/2020 4.60* <2.0 mg/dL Final    Creatinine, Ur 07/29/2020 107.2  39.0 - 259.0 mg/dL Final    Microalbumin Creatinine Ratio 07/29/2020 42.9* 0.0 - 30.0 mg/g Final    Hemoglobin A1C 07/29/2020 10.4  See comment % Final    eAG 07/29/2020 251.8  mg/dL Final   Office Visit on 07/10/2020   Component Date Value Ref Range Status    SARS-CoV-2 07/10/2020 Not Detected  Not Detected Final    Source 07/10/2020 NP swab   Final           Assessment and Plan     Ashley Marino was seen today for diabetes. Diagnoses and all orders for this visit:    Type 2 diabetes mellitus without complication, without long-term current use of insulin (HCC)  -     metFORMIN (GLUCOPHAGE) 1000 MG tablet;  Take 1 tablet by mouth twice daily with food  -     Hemoglobin A1C; Future  - Fructosamine; Future    Uncontrolled type 2 diabetes mellitus with microalbuminuria, with long-term current use of insulin (HCC)  -     metFORMIN (GLUCOPHAGE) 1000 MG tablet; Take 1 tablet by mouth twice daily with food  -     Hemoglobin A1C; Future  -     Fructosamine; Future    Dyslipidemia associated with type 2 diabetes mellitus (HCC)  -     metFORMIN (GLUCOPHAGE) 1000 MG tablet; Take 1 tablet by mouth twice daily with food  -     Hemoglobin A1C; Future  -     Fructosamine; Future    Essential hypertension  -     metFORMIN (GLUCOPHAGE) 1000 MG tablet; Take 1 tablet by mouth twice daily with food  -     Hemoglobin A1C; Future  -     Fructosamine; Future    Morbid obesity due to excess calories (HCC)  -     metFORMIN (GLUCOPHAGE) 1000 MG tablet; Take 1 tablet by mouth twice daily with food  -     Hemoglobin A1C; Future  -     Fructosamine; Future          1: Type 2 DM complicated with nephropathy   Uncontrolled A1C 9.4% < 7.7% <  9.4% < 10.4 %< 10.5% < 7.8% < 7.6% < 9% < 8.9 %< 10.9%      Fructosamine 373 eq to A1C of 9.3% = consistent with A1C of 9.4%      Blood sugars are better than A1C . Work on [employer]         Feb 2021: An abnormal band is present in the \"S\" position on both the   alkaline and acid gels, accounting for 36.9% of the total hemoglobin, consistent with sickle cell trait. Genetic counseling done     DM control has improved with life style change s  Keep working     Metformin 1000MG BID breakfast and lunch   C/w Toujeo 30 units daily at 6am     Need to cut down beer     All instructions provided in written. Check Blood sugars 2-3 times per day. Log them along with insulin and send them every 2 weeks. Call for blood sugars less than 60 or more than 400. Eye exam: Last exam in April 2019, denies      Foot exam: Sep 2019    Deformity/amputation: absent  Skin lesions/pre-ulcerative calluses: absent  Edema: right- negative, left- negative  Sensory exam: Monofilament sensation: to destination      Electronically signed by Martin Rosario MD on 6/14/2021 at 11:07 AM

## 2021-07-04 DIAGNOSIS — N52.9 ERECTILE DYSFUNCTION, UNSPECIFIED ERECTILE DYSFUNCTION TYPE: ICD-10-CM

## 2021-07-06 DIAGNOSIS — N52.9 ERECTILE DYSFUNCTION, UNSPECIFIED ERECTILE DYSFUNCTION TYPE: ICD-10-CM

## 2021-07-06 RX ORDER — SILDENAFIL 100 MG/1
TABLET, FILM COATED ORAL
Qty: 10 TABLET | Refills: 0 | Status: SHIPPED | OUTPATIENT
Start: 2021-07-06 | End: 2021-07-08 | Stop reason: SDUPTHER

## 2021-07-06 NOTE — TELEPHONE ENCOUNTER
Medication:   Requested Prescriptions     Pending Prescriptions Disp Refills    sildenafil (VIAGRA) 100 MG tablet [Pharmacy Med Name: Sildenafil Citrate 100 MG Oral Tablet] 10 tablet 0     Sig: TAKE 1 TABLET BY MOUTH AS NEEDED FOR ERECTILE DYSFUNCTION        Last Filled:      Patient Phone Number: [phone] (home) [phone] (work)    Last appt: 1/22/2021   Next appt: Visit date not found    Last OARRS: No flowsheet data found.

## 2021-07-06 NOTE — TELEPHONE ENCOUNTER
Refill on hydrOXYzine (ATARAX) 25 MG & sildenafil (VIAGRA) 100 MG send to [address] on [address]

## 2021-07-08 RX ORDER — SILDENAFIL 100 MG/1
TABLET, FILM COATED ORAL
Qty: 10 TABLET | Refills: 0 | Status: SHIPPED | OUTPATIENT
Start: 2021-07-08 | End: 2021-09-07

## 2021-07-08 NOTE — TELEPHONE ENCOUNTER
Medication:   Requested Prescriptions     Pending Prescriptions Disp Refills    sildenafil (VIAGRA) 100 MG tablet 10 tablet 0        Last Filled:      Patient Phone Number: [phone] (home) [phone] (work)    Last appt: 1/22/2021   Next appt: Visit date not found    Last OARRS: No flowsheet data found.     PLEASE ADVISE ON THE HYDROXYZINE IS NOT  ON THE LIST     PLEASE ADVISE

## 2021-09-02 DIAGNOSIS — N52.9 ERECTILE DYSFUNCTION, UNSPECIFIED ERECTILE DYSFUNCTION TYPE: ICD-10-CM

## 2021-09-07 RX ORDER — SILDENAFIL 100 MG/1
TABLET, FILM COATED ORAL
Qty: 10 TABLET | Refills: 0 | Status: SHIPPED | OUTPATIENT
Start: 2021-09-07 | End: 2021-10-14

## 2021-10-11 ENCOUNTER — VIRTUAL VISIT (OUTPATIENT)
Dept: ENDOCRINOLOGY | Age: 51
End: 2021-10-11
Payer: COMMERCIAL

## 2021-10-11 DIAGNOSIS — E66.01 MORBID OBESITY DUE TO EXCESS CALORIES (HCC): ICD-10-CM

## 2021-10-11 DIAGNOSIS — I10 ESSENTIAL HYPERTENSION: ICD-10-CM

## 2021-10-11 DIAGNOSIS — E11.69 DYSLIPIDEMIA ASSOCIATED WITH TYPE 2 DIABETES MELLITUS (HCC): ICD-10-CM

## 2021-10-11 DIAGNOSIS — E78.5 DYSLIPIDEMIA ASSOCIATED WITH TYPE 2 DIABETES MELLITUS (HCC): ICD-10-CM

## 2021-10-11 PROCEDURE — 99214 OFFICE O/P EST MOD 30 MIN: CPT | Performed by: INTERNAL MEDICINE

## 2021-10-11 NOTE — PROGRESS NOTES
Patient ID:   Siddharth Ortega is a 45 y.o. male    Chief Complaint:   Siddharth Ortega presents for an evaluation of Type 2 Diabetes Mellitus , Hyperlipidemia and hypertension. Pursuant to the emergency declaration under the [facility], [address] [place] [address] and the [employer] and [employer] General Act this visit was conducted after obtaining verbal consent, with the use of [employer]. me interactive audio and video telecommunications system that permits real time communication between the patient and provider to substitute for an in-person clinic visit to reduce the patient's risk of exposure to COVID-19 and provide necessary medical care. Because this was a Telehealth visit, evaluation of the following organ systems was limited: Vitals, Constitutional, EENT, Resp, CV, GI, , MS, Neuro, Skin. Heme. Lymph, Imm. Siddharth Ortega was at home. Provider was at [facility] office. No one else was involved. The patient has also been advised to contact this office for worsening conditions or problems, and seek emergency medical treatment and/or call 911 if deemed necessary. Subjective:   Type 2 Diabetes Mellitus diagnosed in 2014  On insulin since APRIL 1928   Trulicity caused severe GI side effects   Invokana caused urinary issues      Metformin 1000MG bid. Missing some even doses   Toujeo 30 units daily at 6am. No TTT. Finished [employer]. Will do his [place] license  test.     Eating erratic. A lot of poor choices as per him. Working on diet (mor evegetable, fruits, baking), exercise , cutting down alcohol (on weekends only now)     Active at second job, 4 hours from 5-9pm. Always walking. BS Readings:     10/10  AM-155  PM-180     10/9  AM- 164  PM- 168     10/8  AM-141  PM- 158     10/7  AM- 133  PM-164     10/6  AM- 123  PM-132     10/5  AM-161  PM-128     Checks blood sugars 2 times per day.  Reportedly 's   AM: Lunch:   Supper:   HS:      Hypoglycemias: None     Meals Three dinner is bigger. Chips 2 bags per day, 90 osbaldo each. No sodas, some juice or regular tea. Exercise: Walking at plant, may be 2 miles per day. Bike around the neighborhood twice a week. 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 Family history of CAD: sister  of CAD at age 61   Denies smoking   On low dose Aspirin    The following portions of the patient's history were reviewed and updated as appropriate:       Family History   Problem Relation Age of Onset    Diabetes Mother     Diabetes Sister     Diabetes Brother     Diabetes Brother          Social History     Socioeconomic History    Marital status: Single     Spouse name: Not on file    Number of children: Not on file    Years of education: Not on file    Highest education level: Not on file   Occupational History    Not on file   Tobacco Use    Smoking status: Never Smoker    Smokeless tobacco: Never Used   Vaping Use    Vaping Use: Never used   Substance and Sexual Activity    Alcohol use: Yes     Comment: social    Drug use: No    Sexual activity: Yes   Other Topics Concern    Not on file   Social History Narrative    Not on file     Social Determinants of Health     Financial Resource Strain:     Difficulty of Paying Living Expenses:    Food Insecurity:     Worried About Running Out of Food in the Last Year:     Ran Out of Food in the Last Year:    Transportation Needs:     Lack of Transportation (Medical):      Lack of Transportation (Non-Medical):    Physical Activity:     Days of Exercise per Week:     Minutes of Exercise per Session:    Stress:     Feeling of Stress :    Social Connections:     Frequency of Communication with Friends and Family:     Frequency of Social Gatherings with Friends and Family:     Attends [employer] Services:     Active Member of Clubs or Organizations:     Attends Club or Organization Meetings:     Marital Status:    Intimate Partner Violence:     Fear of Current or Ex-Partner:     Emotionally Abused:     Physically Abused:     Sexually Abused:        Past Medical History:   Diagnosis Date    Diabetes mellitus ([facility] [address]     ED (erectile dysfunction) 1/31/2013    Essential hypertension 9/10/2018    Hyperlipidemia LDL goal < 100 1/31/2013    Morbid obesity due to excess calories ([facility] [address] 11/19/2018       History reviewed. No pertinent surgical history. Allergies   Allergen Reactions    Food      shrimp         Current Outpatient Medications:     sildenafil (VIAGRA) 100 MG tablet, TAKE 1 TABLET BY MOUTH ONCE DAILY AS NEEDED, Disp: 10 tablet, Rfl: 0    TOUJEO SOLOSTAR 300 UNIT/ML SOPN, INJECT 30 UNITS SUBCUTANEOUSLY IN AM, Disp: 18 mL, Rfl: 0    metFORMIN (GLUCOPHAGE) 1000 MG tablet, Take 1 tablet by mouth twice daily with food, Disp: 180 tablet, Rfl: 3    atorvastatin (LIPITOR) 40 MG tablet, TAKE 1 TABLET BY MOUTH ONCE DAILY, Disp: 90 tablet, Rfl: 3    aspirin 81 MG tablet, Take 81 mg by mouth daily, Disp: , Rfl:     Insulin Pen Needle 31G X 8 MM MISC, 1 each by Does not apply route daily, Disp: 100 each, Rfl: 3    Lancets MISC, Test once daily, Disp: 50 each, Rfl: 3    sildenafil (VIAGRA) 100 MG tablet, Take 1 tablet by mouth as needed for Erectile Dysfunction, Disp: 10 tablet, Rfl: 1      Review of Systems:    Constitutional: Negative for fever, chills, and unexpected weight change. HENT: Negative for congestion, ear pain, rhinorrhea,  sore throat and trouble swallowing. Eyes: Negative for photophobia, redness, itching. Respiratory: Negative for cough, shortness of breath and sputum. Cardiovascular: Negative for chest pain, palpitations and leg swelling. Gastrointestinal: Negative for nausea, vomiting, abdominal pain, diarrhea, constipation. Endocrine: Negative for cold intolerance, heat intolerance, polydipsia, polyphagia and polyuria.    Genitourinary: Negative for dysuria, urgency, frequency, hematuria and flank pain. Musculoskeletal: Negative for myalgias, back pain, arthralgias and neck pain. Skin/Nail: Negative for rash, itching. Normal nails. Neurological: Negative for seizures, weakness, light-headedness, numbness and headaches. Hematological/ Lymph nodes: Negative for adenopathy. Does not bruise/bleed easily. Psychiatric/Behavioral: Negative for suicidal ideas, depression, anxiety, sleep disturbance and decreased concentration. Objective:   Physical Exam:  There were no vitals taken for this visit. Constitutional: Patient is oriented to person, place, and time. Patient appears well-developed and well-nourished. Pulmonary/Chest: Effort normal.   Neurological: Patient is alert and oriented to person, place, and time. Psychiatric: Patient has a normal mood and affect.  Patient behavior is normal.     Lab Review:    Orders Only on 10/06/2021   Component Date Value Ref Range Status    Fructosamine 10/06/2021 345* 170 - 285 umol/L Final    Hemoglobin A1C 10/06/2021 9.2  See comment % Final    eAG 10/06/2021 217.3  mg/dL Final   Orders Only on 06/09/2021   Component Date Value Ref Range Status    Hep C Ab Interp 06/09/2021 Non-reactive  Non-reactive Final   Orders Only on 06/09/2021   Component Date Value Ref Range Status    Microalbumin, Random Urine 06/09/2021 8.90* <2.0 mg/dL Final    Creatinine, Ur 06/09/2021 145.1  39.0 - 259.0 mg/dL Final    Microalbumin Creatinine Ratio 06/09/2021 61.3* 0.0 - 30.0 mg/g Final    Cholesterol, Fasting 06/09/2021 121  0 - 199 mg/dL Final    Triglyceride, Fasting 06/09/2021 218* 0 - 150 mg/dL Final    HDL 06/09/2021 35* 40 - 60 mg/dL Final    LDL Calculated 06/09/2021 42  <100 mg/dL Final    VLDL Cholesterol Calculated 06/09/2021 44  Not Established mg/dL Final    Fructosamine 06/09/2021 373* 170 - 285 umol/L Final    Hemoglobin A1C 06/09/2021 9.4  See comment % Final    eAG 06/09/2021 223.1  mg/dL Final   Orders Only on 02/02/2021   Component Date Value Ref Range Status    Sodium 02/02/2021 140  136 - 145 mmol/L Final    Potassium 02/02/2021 4.0  3.5 - 5.1 mmol/L Final    Chloride 02/02/2021 99  99 - 110 mmol/L Final    CO2 02/02/2021 26  21 - 32 mmol/L Final    Anion Gap 02/02/2021 15  3 - 16 Final    Glucose 02/02/2021 116* 70 - 99 mg/dL Final    BUN 02/02/2021 10  7 - 20 mg/dL Final    CREATININE 02/02/2021 0.8* 0.9 - 1.3 mg/dL Final    GFR Non- 02/02/2021 >60  >60 Final    GFR  02/02/2021 >60  >60 Final    Calcium 02/02/2021 9.5  8.3 - 10.6 mg/dL Final    Total Protein 02/02/2021 7.4  6.4 - 8.2 g/dL Final    Albumin 02/02/2021 4.6  3.4 - 5.0 g/dL Final    Albumin/Globulin Ratio 02/02/2021 1.6  1.1 - 2.2 Final    Total Bilirubin 02/02/2021 0.5  0.0 - 1.0 mg/dL Final    Alkaline Phosphatase 02/02/2021 46  40 - 129 U/L Final    ALT 02/02/2021 26  10 - 40 U/L Final    AST 02/02/2021 18  15 - 37 U/L Final    Globulin 02/02/2021 2.8  g/dL Final    WBC 02/02/2021 6.7  4.0 - 11.0 K/uL Final    RBC 02/02/2021 4.81  4.20 - 5.90 M/uL Final    Hemoglobin 02/02/2021 12.7* 13.5 - 17.5 g/dL Final    Hematocrit 02/02/2021 40.4* 40.5 - 52.5 % Final    MCV 02/02/2021 83.9  80.0 - 100.0 fL Final    MCH 02/02/2021 26.3  26.0 - 34.0 pg Final    MCHC 02/02/2021 31.3  31.0 - 36.0 g/dL Final    RDW 02/02/2021 13.8  12.4 - 15.4 % Final    Platelets 99/42/2908 294  135 - 450 K/uL Final    MPV 02/02/2021 9.1  5.0 - 10.5 fL Final    Neutrophils % 02/02/2021 56.2  % Final    Lymphocytes % 02/02/2021 36.6  % Final    Monocytes % 02/02/2021 6.4  % Final    Eosinophils % 02/02/2021 0.4  % Final    Basophils % 02/02/2021 0.4  % Final    Neutrophils Absolute 02/02/2021 3.8  1.7 - 7.7 K/uL Final    Lymphocytes Absolute 02/02/2021 2.5  1.0 - 5.1 K/uL Final    Monocytes Absolute 02/02/2021 0.4  0.0 - 1.3 K/uL Final    Eosinophils Absolute 02/02/2021 0.0  0.0 - 0.6 K/uL Final    Basophils Absolute 02/02/2021 0.0  0.0 - 0.2 K/uL Final    Color, UA 02/02/2021 YELLOW  Straw/Yellow Final    Clarity, UA 02/02/2021 Clear  Clear Final    Glucose, Ur 02/02/2021 Negative  Negative mg/dL Final    Bilirubin Urine 02/02/2021 Negative  Negative Final    Ketones, Urine 02/02/2021 Negative  Negative mg/dL Final    Specific Gravity, UA 02/02/2021 1.015  1.005 - 1.030 Final    Blood, Urine 02/02/2021 Negative  Negative Final    pH, UA 02/02/2021 6.5  5.0 - 8.0 Final    Protein, UA 02/02/2021 Negative  Negative mg/dL Final    Urobilinogen, Urine 02/02/2021 1.0  <2.0 E.U./dL Final    Nitrite, Urine 02/02/2021 Negative  Negative Final    Leukocyte Esterase, Urine 02/02/2021 Negative  Negative Final    Microscopic Examination 02/02/2021 Not Indicated   Final    Urine Type 02/02/2021 Voided   Final    Hemoglobin, Elp 02/02/2021 See Comment   Final    Fructosamine 02/02/2021 312* 170 - 285 umol/L Final    Hemoglobin A1C 02/02/2021 7.7  See comment % Final    eAG 02/02/2021 174.3  mg/dL Final    Interpretation + ELECTROPHORESIS, * 02/02/2021 REVIEWED   Final   Orders Only on 11/03/2020   Component Date Value Ref Range Status    Hemoglobin A1C 11/03/2020 9.4  See comment % Final    eAG 11/03/2020 223.1  mg/dL Final    Microalbumin, Random Urine 11/03/2020 7.70* <2.0 mg/dL Final    Creatinine, Ur 11/03/2020 129.1  39.0 - 259.0 mg/dL Final    Microalbumin Creatinine Ratio 11/03/2020 59.6* 0.0 - 30.0 mg/g Final           Assessment and Plan     Leslee Becker was seen today for diabetes. Diagnoses and all orders for this visit:    Uncontrolled type 2 diabetes mellitus with microalbuminuria, with long-term current use of insulin ([facility] [address]  -     Hemoglobin A1C; Future  -     Microalbumin / Creatinine Urine Ratio; Future  -     Fructosamine;  Future    Dyslipidemia associated with type 2 diabetes mellitus ([facility] [address]  -     Hemoglobin A1C; Future  -     Microalbumin / Creatinine Urine Ratio; Future  -     Fructosamine; Future    Essential hypertension  -     Hemoglobin A1C; Future  -     Microalbumin / Creatinine Urine Ratio; Future  -     Fructosamine; Future    Morbid obesity due to excess calories ([facility])  -     Hemoglobin A1C; Future  -     Microalbumin / Creatinine Urine Ratio; Future  -     Fructosamine; Future          1: Type 2 DM complicated with nephropathy   Uncontrolled A1C    9.4% < 7.7% <  9.4% < 10.4 %< 10.5% < 7.8% < 7.6% < 9% < 8.9 %< 10.9%      Fructosamine 373 eq to A1C of 9.3% = consistent with A1C of 9.4% - June 2021   Fructosamine 345 eq to A1C of 8.5%. Better than A1C of 9.2% - Oct 2021      Feb 2021: An abnormal band is present in the \"S\" position on both the alkaline and acid gels, accounting for 36.9% of the total hemoglobin, consistent with sickle cell trait. Genetic counseling done     Blood sugars are better than A1C. Will check A1C and fructosamine     Work on the diet     DM control has improved with life style change s  Keep working     Metformin 1000MG BID breakfast and lunch   Increase Toujeo to 32 units daily at 6am     Need to cut down beer     All instructions provided in written. Check Blood sugars 2-3 times per day. Log them along with insulin and send them every 2 weeks. Call for blood sugars less than 60 or more than 400. Eye exam: Last exam in April 2019, markosies  Foot exam: Sep 2019    Deformity/amputation: absent  Skin lesions/pre-ulcerative calluses: absent  Edema: right- negative, left- negative  Sensory exam: Monofilament sensation: normal  Pulses: normal, Vibration (128 Hz): Intact    Renal screen: 87, Jan 2020 > 64 April 2020 > 43 July 2020 > 59 Nov 2020 > 61 June 2021   For proteinuria, need to control DM and lose weight. TSH screen: Jan 2017     2: HTN   Controlled with out meds   May add lisinopril for continued proteinuria . She does not want to do any more medicine.    Also need to improve diabetes control     3: Hyperlipidemia   LDL: 42, HDL: 35, TGs: 218 - June 2021      Atorvastatin 40mg daily     4: Morbid obesity   Need to work on diet, exercise and lose weight     RTC in 3 months A1C, Fructosamine, MACR      EDUCATION:   Greater than 50% of this follow-up visit was spent in general counseling regarding   obesity, diet, exercise, importance of adherence to insulin regime, recognition and treatment of hypo and hyperglycemia,  glucose logging, proper diabetes management, diabetic complications with poor management and the importance of glycemic control in order to avoid the complications of diabetes. Risks and potential complications of diabetes were reviewed with the patient. Diabetes health maintenance plan and follow-up were discussed and understood by the patient. We reviewed the importance of medication compliance and regular follow-up. Aggressive lifestyle modification was encouraged. Exercise Counselling: This patient is a candidate for regular physical exercise. Instructions to perform the following types of exercise:  Swimming or water aerobic exercise  Brisk walking  Playing tennis  Stationary bicycle or elliptical indoor  Low impact aerobic exercise    Instructions given to exercise for the following duration:  30 minutes a day for five-seven days per week.     Following instructions for being active throughout the day in addition to formal exercise:  Walk instead of drive whenever possible  Take the stairs instead of the elevator  Work in the garden  Park to the far end of the parking lot to add more walking steps to destination      Electronically signed by Alma King MD on 10/11/2021 at 3:50 PM

## 2021-10-11 NOTE — PROGRESS NOTES
BS Readings:    10/10  AM-155  PM-180    10/9  AM- 164  PM- 168    10/8  AM-141  PM- 158    10/7  AM- 133  PM-164    10/6  AM- 123  PM-132    10/5  AM-161  PM-128

## 2021-10-19 ENCOUNTER — TELEPHONE (OUTPATIENT)
Dept: PRIMARY CARE CLINIC | Age: 51
End: 2021-10-19

## 2021-10-19 DIAGNOSIS — Z12.5 SCREENING FOR PROSTATE CANCER: Primary | ICD-10-CM

## 2021-10-19 NOTE — TELEPHONE ENCOUNTER
----- Message from Edel Zayas sent at 10/18/2021  4:09 PM EDT -----  Subject: Referral Request    QUESTIONS   Reason for referral request? Pt is requesting a referral for his Prostate   Exam   Has the physician seen you for this condition before? No   Preferred Specialist (if applicable)? Do you already have an appointment scheduled? No  Additional Information for Provider?   ---------------------------------------------------------------------------  --------------  CALL BACK INFO  What is the best way for the office to contact you? OK to leave message on   voicemail  Preferred Call Back Phone Number?  [phone]

## 2021-10-20 RX ORDER — HYDROXYZINE HYDROCHLORIDE 25 MG/1
25 TABLET, FILM COATED ORAL EVERY 6 HOURS PRN
Qty: 30 TABLET | Refills: 3 | Status: SHIPPED | OUTPATIENT
Start: 2021-10-20 | End: 2022-03-22 | Stop reason: SDUPTHER

## 2021-10-20 NOTE — TELEPHONE ENCOUNTER
Pt was advised and states he needs a refill on the hydrOXYzine (ATARAX) 25 MG tablet     Med pended- pt states he is having itching again

## 2021-10-27 DIAGNOSIS — Z12.5 SCREENING FOR PROSTATE CANCER: ICD-10-CM

## 2021-10-27 LAB — PROSTATE SPECIFIC ANTIGEN: 0.87 NG/ML (ref 0–4)

## 2022-01-12 DIAGNOSIS — E11.69 DYSLIPIDEMIA ASSOCIATED WITH TYPE 2 DIABETES MELLITUS (HCC): ICD-10-CM

## 2022-01-12 DIAGNOSIS — E78.5 DYSLIPIDEMIA ASSOCIATED WITH TYPE 2 DIABETES MELLITUS (HCC): ICD-10-CM

## 2022-01-12 DIAGNOSIS — I10 ESSENTIAL HYPERTENSION: ICD-10-CM

## 2022-01-12 DIAGNOSIS — E66.01 MORBID OBESITY DUE TO EXCESS CALORIES (HCC): ICD-10-CM

## 2022-01-12 LAB
CREATININE URINE: 124.9 MG/DL (ref 39–259)
MICROALBUMIN UR-MCNC: 14 MG/DL
MICROALBUMIN/CREAT UR-RTO: 112.1 MG/G (ref 0–30)

## 2022-01-13 LAB
ESTIMATED AVERAGE GLUCOSE: 228.8 MG/DL
HBA1C MFR BLD: 9.6 %

## 2022-01-14 LAB — FRUCTOSAMINE: 375 UMOL/L (ref 170–285)

## 2022-01-17 ENCOUNTER — VIRTUAL VISIT (OUTPATIENT)
Dept: ENDOCRINOLOGY | Age: 52
End: 2022-01-17
Payer: COMMERCIAL

## 2022-01-17 DIAGNOSIS — I10 ESSENTIAL HYPERTENSION: ICD-10-CM

## 2022-01-17 DIAGNOSIS — D57.3 SICKLE CELL TRAIT (HCC): ICD-10-CM

## 2022-01-17 DIAGNOSIS — E78.5 DYSLIPIDEMIA ASSOCIATED WITH TYPE 2 DIABETES MELLITUS (HCC): ICD-10-CM

## 2022-01-17 DIAGNOSIS — E11.69 DYSLIPIDEMIA ASSOCIATED WITH TYPE 2 DIABETES MELLITUS (HCC): ICD-10-CM

## 2022-01-17 PROCEDURE — 99214 OFFICE O/P EST MOD 30 MIN: CPT | Performed by: INTERNAL MEDICINE

## 2022-01-17 RX ORDER — LANCETS 30 GAUGE
EACH MISCELLANEOUS
Qty: 100 EACH | Refills: 11 | Status: SHIPPED | OUTPATIENT
Start: 2022-01-17

## 2022-01-17 RX ORDER — DIPHENHYDRAMINE HYDROCHLORIDE 25 MG/1
CAPSULE, LIQUID FILLED ORAL
Qty: 1 KIT | Refills: 0 | Status: SHIPPED | OUTPATIENT
Start: 2022-01-17

## 2022-01-17 RX ORDER — GLUCOSAMINE HCL/CHONDROITIN SU 500-400 MG
CAPSULE ORAL
Qty: 100 STRIP | Refills: 11 | Status: SHIPPED | OUTPATIENT
Start: 2022-01-17

## 2022-01-17 NOTE — PROGRESS NOTES
Patient ID:   Tata Valladares is a 47 y.o. male    Chief Complaint:   Tata Valladares presents for an evaluation of Type 2 Diabetes Mellitus , Hyperlipidemia and hypertension. Pursuant to the emergency declaration under the [address] [facility], [address] [place] [address] and the [employer] General Act this visit was conducted after obtaining verbal consent, with the use of Doxy. me interactive audio and video telecommunications system that permits real time communication between the patient and provider to substitute for an in-person clinic visit to reduce the patient's risk of exposure to COVID-19 and provide necessary medical care. Because this was a Telehealth visit, evaluation of the following organ systems was limited: Vitals, Constitutional, EENT, Resp, CV, GI, , MS, Neuro, Skin. Heme. Lymph, Imm. Tata Valladares was at home. Provider was at [facility] office. No one else was involved. The patient has also been advised to contact this office for worsening conditions or problems, and seek emergency medical treatment and/or call 911 if deemed necessary. Visit started on doxy and finished on telephone. Subjective:   Type 2 Diabetes Mellitus diagnosed in 2014  On insulin since APRIL 2061   Trulicity caused severe GI side effects   Invokana caused urinary issues      Metformin 1000MG bid. Missing some even doses   Toujeo 32 units daily at 6am. No TTT. Finished [employer]. Will do his [place] license  test.     Eating erratic. A lot of poor choices as per him. Working on diet (mor evegetable, fruits, baking), exercise , cutting down alcohol (on weekends only now)     Active at second job, 4 hours from 5-9pm. Always walking. Not checking sugars as he lost his meter. Checks blood sugars 2 times per day.  Reportedly 's   AM:       Lunch:   Supper:   HS:      Hypoglycemias: None     Meals Three dinner is bigger. Chips 2 bags per day, 90 osbaldo each. No sodas, some juice or regular tea. Exercise: Walking at plant, may be 2 miles per day. Bike around the neighborhood twice a week. 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 Family history of CAD: sister  of CAD at age 61   Denies smoking   On low dose Aspirin    The following portions of the patient's history were reviewed and updated as appropriate:       Family History   Problem Relation Age of Onset    Diabetes Mother     Diabetes Sister     Diabetes Brother     Diabetes Brother          Social History     Socioeconomic History    Marital status: Single     Spouse name: Not on file    Number of children: Not on file    Years of education: Not on file    Highest education level: Not on file   Occupational History    Not on file   Tobacco Use    Smoking status: Never Smoker    Smokeless tobacco: Never Used   Vaping Use    Vaping Use: Never used   Substance and Sexual Activity    Alcohol use: Yes     Comment: social    Drug use: No    Sexual activity: Yes   Other Topics Concern    Not on file   Social History Narrative    Not on file     Social Determinants of Health     Financial Resource Strain:     Difficulty of Paying Living Expenses: Not on file   Food Insecurity:     Worried About Running Out of Food in the Last Year: Not on file    Yamile of Food in the Last Year: Not on file   Transportation Needs:     Lack of Transportation (Medical): Not on file    Lack of Transportation (Non-Medical):  Not on file   Physical Activity:     Days of Exercise per Week: Not on file    Minutes of Exercise per Session: Not on file   Stress:     Feeling of Stress : Not on file   Social Connections:     Frequency of Communication with Friends and Family: Not on file    Frequency of Social Gatherings with Friends and Family: Not on file    Attends Yazidi Services: Not on file   [employer] of Clubs or Organizations: Not on file    Attends Club or Organization Meetings: Not on file    Marital Status: Not on file   Intimate Partner Violence:     Fear of Current or Ex-Partner: Not on file    Emotionally Abused: Not on file    Physically Abused: Not on file    Sexually Abused: Not on file   Housing Stability:     Unable to Pay for Housing in the Last Year: Not on file    Number of [place] in the Last Year: Not on file    Unstable Housing in the Last Year: Not on file       Past Medical History:   Diagnosis Date    Diabetes mellitus ([facility]     ED (erectile dysfunction) 1/31/2013    Essential hypertension 9/10/2018    Hyperlipidemia LDL goal < 100 1/31/2013    Morbid obesity due to excess calories ([facility] [facility] 11/19/2018       History reviewed. No pertinent surgical history.       Allergies   Allergen Reactions    Food      shrimp         Current Outpatient Medications:     Blood Glucose Monitoring Suppl (BLOOD GLUCOSE MONITOR SYSTEM) w/Device KIT, To check blood sugar 3 times per day, Disp: 1 kit, Rfl: 0    Lancets MISC, To check blood sugar 3 times per day, Disp: 100 each, Rfl: 11    blood glucose monitor strips, To check blood sugar 3 times per day, Disp: 100 strip, Rfl: 11    TOUJEO SOLOSTAR 300 UNIT/ML SOPN, INJECT 30 UNITS SUBCUTANEOUSLY IN THE MORNING (Patient taking differently: INJECT 32 UNITS SUBCUTANEOUSLY IN THE MORNING), Disp: 6 mL, Rfl: 0    sildenafil (VIAGRA) 100 MG tablet, TAKE 1 TABLET BY MOUTH ONCE DAILY AS NEEDED, Disp: 10 tablet, Rfl: 5    metFORMIN (GLUCOPHAGE) 1000 MG tablet, Take 1 tablet by mouth twice daily with food, Disp: 180 tablet, Rfl: 3    atorvastatin (LIPITOR) 40 MG tablet, TAKE 1 TABLET BY MOUTH ONCE DAILY, Disp: 90 tablet, Rfl: 3    aspirin 81 MG tablet, Take 81 mg by mouth daily, Disp: , Rfl:     Insulin Pen Needle 31G X 8 MM MISC, 1 each by Does not apply route daily, Disp: 100 each, Rfl: 3    Lancets MISC, Test once daily, Disp: 50 each, Rfl: 02/02/2021 0.5  0.0 - 1.0 mg/dL Final    Alkaline Phosphatase 02/02/2021 46  40 - 129 U/L Final    ALT 02/02/2021 26  10 - 40 U/L Final    AST 02/02/2021 18  15 - 37 U/L Final    Globulin 02/02/2021 2.8  g/dL Final    WBC 02/02/2021 6.7  4.0 - 11.0 K/uL Final    RBC 02/02/2021 4.81  4.20 - 5.90 M/uL Final    Hemoglobin 02/02/2021 12.7* 13.5 - 17.5 g/dL Final    Hematocrit 02/02/2021 40.4* 40.5 - 52.5 % Final    MCV 02/02/2021 83.9  80.0 - 100.0 fL Final    MCH 02/02/2021 26.3  26.0 - 34.0 pg Final    MCHC 02/02/2021 31.3  31.0 - 36.0 g/dL Final    RDW 02/02/2021 13.8  12.4 - 15.4 % Final    Platelets 33/07/0998 294  135 - 450 K/uL Final    MPV 02/02/2021 9.1  5.0 - 10.5 fL Final    Neutrophils % 02/02/2021 56.2  % Final    Lymphocytes % 02/02/2021 36.6  % Final    Monocytes % 02/02/2021 6.4  % Final    Eosinophils % 02/02/2021 0.4  % Final    Basophils % 02/02/2021 0.4  % Final    Neutrophils Absolute 02/02/2021 3.8  1.7 - 7.7 K/uL Final    Lymphocytes Absolute 02/02/2021 2.5  1.0 - 5.1 K/uL Final    Monocytes Absolute 02/02/2021 0.4  0.0 - 1.3 K/uL Final    Eosinophils Absolute 02/02/2021 0.0  0.0 - 0.6 K/uL Final    Basophils Absolute 02/02/2021 0.0  0.0 - 0.2 K/uL Final    Color, UA 02/02/2021 YELLOW  Straw/Yellow Final    Clarity, UA 02/02/2021 Clear  Clear Final    Glucose, Ur 02/02/2021 Negative  Negative mg/dL Final    Bilirubin Urine 02/02/2021 Negative  Negative Final    Ketones, Urine 02/02/2021 Negative  Negative mg/dL Final    Specific Gravity, UA 02/02/2021 1.015  1.005 - 1.030 Final    Blood, Urine 02/02/2021 Negative  Negative Final    pH, UA 02/02/2021 6.5  5.0 - 8.0 Final    Protein, UA 02/02/2021 Negative  Negative mg/dL Final    Urobilinogen, Urine 02/02/2021 1.0  <2.0 E.U./dL Final    Nitrite, Urine 02/02/2021 Negative  Negative Final    Leukocyte Esterase, Urine 02/02/2021 Negative  Negative Final    Microscopic Examination 02/02/2021 Not Indicated   Final  Urine Type 02/02/2021 Voided   Final    Hemoglobin, Elp 02/02/2021 See Comment   Final    Fructosamine 02/02/2021 312* 170 - 285 umol/L Final    Hemoglobin A1C 02/02/2021 7.7  See comment % Final    eAG 02/02/2021 174.3  mg/dL Final    Interpretation + ELECTROPHORESIS, * 02/02/2021 REVIEWED   Final           Assessment and Plan     Landrum Hammans was seen today for diabetes. Diagnoses and all orders for this visit:    Uncontrolled type 2 diabetes mellitus with microalbuminuria, with long-term current use of insulin ([facility] [facility]  -     Hemoglobin A1C; Future  -     Fructosamine; Future  -     Blood Glucose Monitoring Suppl (BLOOD GLUCOSE MONITOR SYSTEM) w/Device KIT; To check blood sugar 3 times per day  -     Lancets MISC; To check blood sugar 3 times per day  -     blood glucose monitor strips; To check blood sugar 3 times per day    Dyslipidemia associated with type 2 diabetes mellitus ([facility] [address] [facility]  -     Hemoglobin A1C; Future  -     Fructosamine; Future  -     Blood Glucose Monitoring Suppl (BLOOD GLUCOSE MONITOR SYSTEM) w/Device KIT; To check blood sugar 3 times per day  -     Lancets MISC; To check blood sugar 3 times per day  -     blood glucose monitor strips; To check blood sugar 3 times per day    Essential hypertension  -     Hemoglobin A1C; Future  -     Fructosamine; Future  -     Blood Glucose Monitoring Suppl (BLOOD GLUCOSE MONITOR SYSTEM) w/Device KIT; To check blood sugar 3 times per day  -     Lancets MISC; To check blood sugar 3 times per day  -     blood glucose monitor strips; To check blood sugar 3 times per day    Sickle cell trait (HCC)  -     Hemoglobin A1C; Future  -     Fructosamine; Future  -     Blood Glucose Monitoring Suppl (BLOOD GLUCOSE MONITOR SYSTEM) w/Device KIT; To check blood sugar 3 times per day  -     Lancets MISC; To check blood sugar 3 times per day  -     blood glucose monitor strips;  To check blood sugar 3 times per day          1: Type 2 DM complicated with nephropathy Uncontrolled A1C 9.6% < 9.2% <  9.4% < 7.7% <  9.4% < 10.4 %< 10.5% < 7.8% < 7.6% < 9% < 8.9 %< 10.9%      Fructosamine 373 eq to A1C of 9.3% = consistent with A1C of 9.4% - June 2021   Fructosamine 345 eq to A1C of 8.5%. Better than A1C of 9.2% - Oct 2021   Fructosamine 375, eq to A1C of 9.3%. consistent with A1C of 9.6%. Feb 2021: An abnormal band is present in the \"S\" position on both the alkaline and acid gels, accounting for 36.9% of the total hemoglobin, consistent with sickle cell trait. Genetic counseling done     Blood sugars not available    He tried picking up meter but his fav meter is not available     Need to work on the diet     Metformin 1000MG BID breakfast and lunch   C/w Toujeo to 32 units daily at 6am     Need to cut down beer     All instructions provided in written. Check Blood sugars 2-3 times per day. Log them along with insulin and send them every 2 weeks. Call for blood sugars less than 60 or more than 400. Eye exam: Last exam in April 2019, denies  Foot exam: Sep 2019    Deformity/amputation: absent  Skin lesions/pre-ulcerative calluses: absent  Edema: right- negative, left- negative  Sensory exam: Monofilament sensation: normal  Pulses: normal, Vibration (128 Hz): Intact    Renal screen: 88, Jan 2020 > 64 April 2020 > 43 July 2020 > 59 Nov 2020 > 61 June 2021 > 112 Jan 2022. For proteinuria, need to control DM and lose weight. TSH screen: Jan 2017     2: HTN   Controlled with out meds   May add lisinopril for continued proteinuria . She does not want to do any more medicine.    Also need to improve diabetes control     3: Hyperlipidemia   LDL: 42, HDL: 35, TGs: 218 - June 2021      Atorvastatin 40mg daily     4: Morbid obesity   Need to work on diet, exercise and lose weight     RTC in 3 months A1C, Fructosamine       EDUCATION:   Greater than 50% of this follow-up visit was spent in general counseling regarding   obesity, diet, exercise, importance of adherence to

## 2022-02-09 DIAGNOSIS — E11.69 DYSLIPIDEMIA ASSOCIATED WITH TYPE 2 DIABETES MELLITUS (HCC): ICD-10-CM

## 2022-02-09 DIAGNOSIS — E66.01 MORBID OBESITY DUE TO EXCESS CALORIES (HCC): ICD-10-CM

## 2022-02-09 DIAGNOSIS — I10 ESSENTIAL HYPERTENSION: ICD-10-CM

## 2022-02-09 DIAGNOSIS — E11.9 TYPE 2 DIABETES MELLITUS WITHOUT COMPLICATION, WITHOUT LONG-TERM CURRENT USE OF INSULIN (HCC): Chronic | ICD-10-CM

## 2022-02-09 DIAGNOSIS — E78.5 DYSLIPIDEMIA ASSOCIATED WITH TYPE 2 DIABETES MELLITUS (HCC): ICD-10-CM

## 2022-02-10 RX ORDER — INSULIN GLARGINE 300 U/ML
INJECTION, SOLUTION SUBCUTANEOUS
Qty: 6 ML | Refills: 0 | OUTPATIENT
Start: 2022-02-10

## 2022-02-10 NOTE — TELEPHONE ENCOUNTER
Requested Prescriptions     Pending Prescriptions Disp Refills    TOUJEO SOLOSTAR 300 UNIT/ML SOPN [Pharmacy Med Name: Toujeo SoloStar 300 UNIT/ML Subcutaneous Solution Pen-injector] 6 mL 0     Sig: INJECT 30 UNITS SUBCUTANEOUSLY IN THE MORNING     Last refilled:12/27/2021  Last seen: VV 1/17/2022  Follow up: Needed

## 2022-02-11 DIAGNOSIS — I10 ESSENTIAL HYPERTENSION: ICD-10-CM

## 2022-02-11 DIAGNOSIS — E11.9 TYPE 2 DIABETES MELLITUS WITHOUT COMPLICATION, WITHOUT LONG-TERM CURRENT USE OF INSULIN (HCC): Chronic | ICD-10-CM

## 2022-02-11 DIAGNOSIS — E78.5 DYSLIPIDEMIA ASSOCIATED WITH TYPE 2 DIABETES MELLITUS (HCC): ICD-10-CM

## 2022-02-11 DIAGNOSIS — E11.69 DYSLIPIDEMIA ASSOCIATED WITH TYPE 2 DIABETES MELLITUS (HCC): ICD-10-CM

## 2022-02-11 DIAGNOSIS — E66.01 MORBID OBESITY DUE TO EXCESS CALORIES (HCC): ICD-10-CM

## 2022-02-14 ENCOUNTER — TELEPHONE (OUTPATIENT)
Dept: PRIMARY CARE CLINIC | Age: 52
End: 2022-02-14

## 2022-02-14 DIAGNOSIS — E11.9 TYPE 2 DIABETES MELLITUS WITHOUT COMPLICATION, WITHOUT LONG-TERM CURRENT USE OF INSULIN (HCC): Chronic | ICD-10-CM

## 2022-02-14 DIAGNOSIS — E78.5 DYSLIPIDEMIA ASSOCIATED WITH TYPE 2 DIABETES MELLITUS (HCC): ICD-10-CM

## 2022-02-14 DIAGNOSIS — E11.69 DYSLIPIDEMIA ASSOCIATED WITH TYPE 2 DIABETES MELLITUS (HCC): ICD-10-CM

## 2022-02-14 DIAGNOSIS — I10 ESSENTIAL HYPERTENSION: ICD-10-CM

## 2022-02-14 DIAGNOSIS — E66.01 MORBID OBESITY DUE TO EXCESS CALORIES (HCC): ICD-10-CM

## 2022-02-14 RX ORDER — INSULIN GLARGINE 300 U/ML
INJECTION, SOLUTION SUBCUTANEOUS
Qty: 6 ML | Refills: 0 | OUTPATIENT
Start: 2022-02-14

## 2022-02-14 RX ORDER — INSULIN GLARGINE 300 U/ML
INJECTION, SOLUTION SUBCUTANEOUS
Qty: 2 PEN | Refills: 3 | Status: SHIPPED | OUTPATIENT
Start: 2022-02-14 | End: 2022-05-09 | Stop reason: SDUPTHER

## 2022-02-14 NOTE — TELEPHONE ENCOUNTER
Refill on TOUJEO SOLOSTAR 300 UNIT/ML SOPN please send to [address] on [address] [facility]

## 2022-03-21 ENCOUNTER — TELEPHONE (OUTPATIENT)
Dept: ENDOCRINOLOGY | Age: 52
End: 2022-03-21

## 2022-03-22 ENCOUNTER — TELEMEDICINE (OUTPATIENT)
Dept: PRIMARY CARE CLINIC | Age: 52
End: 2022-03-22
Payer: COMMERCIAL

## 2022-03-22 ENCOUNTER — TELEPHONE (OUTPATIENT)
Dept: PRIMARY CARE CLINIC | Age: 52
End: 2022-03-22

## 2022-03-22 DIAGNOSIS — N52.9 ERECTILE DYSFUNCTION, UNSPECIFIED ERECTILE DYSFUNCTION TYPE: Primary | ICD-10-CM

## 2022-03-22 DIAGNOSIS — L29.9 ITCHING: ICD-10-CM

## 2022-03-22 DIAGNOSIS — N52.9 ERECTILE DYSFUNCTION, UNSPECIFIED ERECTILE DYSFUNCTION TYPE: ICD-10-CM

## 2022-03-22 DIAGNOSIS — N52.39 POST-PROCEDURAL ERECTILE DYSFUNCTION, UNSPECIFIED TYPE: Primary | ICD-10-CM

## 2022-03-22 PROCEDURE — 99213 OFFICE O/P EST LOW 20 MIN: CPT | Performed by: INTERNAL MEDICINE

## 2022-03-22 RX ORDER — HYDROXYZINE HYDROCHLORIDE 25 MG/1
25 TABLET, FILM COATED ORAL EVERY 6 HOURS PRN
Qty: 30 TABLET | Refills: 3 | Status: SHIPPED | OUTPATIENT
Start: 2022-03-22 | End: 2022-04-01

## 2022-03-22 RX ORDER — SILDENAFIL CITRATE 100 MG
100 TABLET ORAL PRN
Qty: 10 TABLET | Refills: 5 | Status: SHIPPED | OUTPATIENT
Start: 2022-03-22 | End: 2022-03-23

## 2022-03-22 SDOH — ECONOMIC STABILITY: FOOD INSECURITY: WITHIN THE PAST 12 MONTHS, YOU WORRIED THAT YOUR FOOD WOULD RUN OUT BEFORE YOU GOT MONEY TO BUY MORE.: NEVER TRUE

## 2022-03-22 SDOH — ECONOMIC STABILITY: FOOD INSECURITY: WITHIN THE PAST 12 MONTHS, THE FOOD YOU BOUGHT JUST DIDN'T LAST AND YOU DIDN'T HAVE MONEY TO GET MORE.: NEVER TRUE

## 2022-03-22 ASSESSMENT — SOCIAL DETERMINANTS OF HEALTH (SDOH): HOW HARD IS IT FOR YOU TO PAY FOR THE VERY BASICS LIKE FOOD, HOUSING, MEDICAL CARE, AND HEATING?: NOT HARD AT ALL

## 2022-03-22 NOTE — TELEPHONE ENCOUNTER
Called [facility] to see if there are refills available. Pharmacy opens at 9 am   Per Bridget Vital with [address] Mr. Humphries Pair has 3 refills left on file.

## 2022-03-22 NOTE — PROGRESS NOTES
Jaren Cunha (:  1970) is a Established patient, here for evaluation of the following: The patient wanted a refill of Viagra (brand name). He also wanted a refill hydroxyzine. He had no other outstanding complaints. You will need to follow-up in the office as the next time to review and for health maintenance follow-up. Assessment & Plan   Below is the assessment and plan developed based on review of pertinent history, physical exam, labs, studies, and medications. 1. Post-procedural erectile dysfunction, unspecified type  2. Erectile dysfunction, unspecified erectile dysfunction type  -     VIAGRA 100 MG tablet; Take 1 tablet by mouth as needed for Erectile Dysfunction The patient wants brand name Viagra, Disp-10 tablet, R-5, DAWNormal  3. Itching  -     hydrOXYzine (ATARAX) 25 MG tablet; Take 1 tablet by mouth every 6 hours as needed for Itching May cause drowsiness. , Disp-30 tablet, R-3Normal    No follow-ups on file.        Subjective   HPI  Review of Systems       Objective   Patient-Reported Vitals  Patient-Reported Weight: 230  Patient-Reported Height: 5'11       Physical Exam  [INSTRUCTIONS:  \"[x]\" Indicates a positive item  \"[]\" Indicates a negative item  -- DELETE ALL ITEMS NOT EXAMINED]    Constitutional: [x] Appears well-developed and well-nourished [x] No apparent distress      [] Abnormal -     Mental status: [x] Alert and awake  [x] Oriented to person/place/time [x] Able to follow commands    [] Abnormal -     Eyes:   EOM    [x]  Normal    [] Abnormal -   Sclera  [x]  Normal    [] Abnormal -          Discharge [x]  None visible   [] Abnormal -     HENT: [x] Normocephalic, atraumatic  [] Abnormal -   [x] Mouth/Throat: Mucous membranes are moist    External Ears [x] Normal  [] Abnormal -    Neck: [x] No visualized mass [] Abnormal -     Pulmonary/Chest: [x] Respiratory effort normal   [x] No visualized signs of difficulty breathing or respiratory distress        [] Abnormal - Musculoskeletal:   [x] Normal gait with no signs of ataxia         [x] Normal range of motion of neck        [] Abnormal -     Neurological:        [x] No Facial Asymmetry (Cranial nerve 7 motor function) (limited exam due to video visit)          [x] No gaze palsy        [] Abnormal -          Skin:        [x] No significant exanthematous lesions or discoloration noted on facial skin         [] Abnormal -            Psychiatric:       [x] Normal Affect [] Abnormal -        [x] No Hallucinations    Other pertinent observable physical exam findings:-             On this date 3/22/2022 I have spent 25 minutes reviewing previous notes, test results and face to face (virtual) with the patient discussing the diagnosis and importance of compliance with the treatment plan as well as documenting on the day of the visit. Maurisio Chapa, was evaluated through a synchronous (real-time) audio-video encounter. The patient (or guardian if applicable) is aware that this is a billable service, which includes applicable co-pays. This Virtual Visit was conducted with patient's (and/or legal guardian's) consent. The visit was conducted pursuant to the emergency declaration under the [facility], [address] [place] [address] [facility] authority and the [employer] General Act. Patient identification was verified, and a caregiver was present when appropriate. The patient was located at home in a state where the provider was licensed to provide care.        --Sophia Acharya MD

## 2022-03-22 NOTE — TELEPHONE ENCOUNTER
VIAGRA 100 MG tablet [[id number]    Pt would like something different due to his insurance will not pay for this medication anymore Pt stated he would like some Cialis in place of Viagra if any question call Pt @ [phone] Please Advise

## 2022-03-22 NOTE — PATIENT INSTRUCTIONS
Use the Viagra and hydroxyzine as directed. Follow-up with me in the office in 4 months review of your health maintenance and for vital signs.

## 2022-03-23 RX ORDER — TADALAFIL 20 MG/1
20 TABLET ORAL DAILY PRN
Qty: 15 TABLET | Refills: 1 | Status: SHIPPED | OUTPATIENT
Start: 2022-03-23

## 2022-04-02 DIAGNOSIS — E11.9 TYPE 2 DIABETES MELLITUS WITHOUT COMPLICATION, WITHOUT LONG-TERM CURRENT USE OF INSULIN (HCC): Chronic | ICD-10-CM

## 2022-04-02 DIAGNOSIS — E66.01 MORBID OBESITY DUE TO EXCESS CALORIES (HCC): ICD-10-CM

## 2022-04-02 DIAGNOSIS — E78.5 DYSLIPIDEMIA ASSOCIATED WITH TYPE 2 DIABETES MELLITUS (HCC): ICD-10-CM

## 2022-04-02 DIAGNOSIS — E11.69 DYSLIPIDEMIA ASSOCIATED WITH TYPE 2 DIABETES MELLITUS (HCC): ICD-10-CM

## 2022-04-02 DIAGNOSIS — I10 ESSENTIAL HYPERTENSION: ICD-10-CM

## 2022-04-04 RX ORDER — ATORVASTATIN CALCIUM 40 MG/1
TABLET, FILM COATED ORAL
Qty: 90 TABLET | Refills: 3 | Status: SHIPPED | OUTPATIENT
Start: 2022-04-04

## 2022-04-14 DIAGNOSIS — D57.3 SICKLE CELL TRAIT (HCC): ICD-10-CM

## 2022-04-14 DIAGNOSIS — E78.5 DYSLIPIDEMIA ASSOCIATED WITH TYPE 2 DIABETES MELLITUS (HCC): ICD-10-CM

## 2022-04-14 DIAGNOSIS — I10 ESSENTIAL HYPERTENSION: ICD-10-CM

## 2022-04-14 DIAGNOSIS — E11.69 DYSLIPIDEMIA ASSOCIATED WITH TYPE 2 DIABETES MELLITUS (HCC): ICD-10-CM

## 2022-04-15 LAB
ESTIMATED AVERAGE GLUCOSE: 234.6 MG/DL
HBA1C MFR BLD: 9.8 %

## 2022-04-16 LAB — FRUCTOSAMINE: 347 UMOL/L (ref 205–285)

## 2022-05-09 ENCOUNTER — OFFICE VISIT (OUTPATIENT)
Dept: ENDOCRINOLOGY | Age: 52
End: 2022-05-09
Payer: COMMERCIAL

## 2022-05-09 VITALS
BODY MASS INDEX: 31.69 KG/M2 | OXYGEN SATURATION: 97 % | HEIGHT: 71 IN | WEIGHT: 226.4 LBS | DIASTOLIC BLOOD PRESSURE: 81 MMHG | SYSTOLIC BLOOD PRESSURE: 125 MMHG | HEART RATE: 84 BPM | TEMPERATURE: 98 F

## 2022-05-09 DIAGNOSIS — E11.69 DYSLIPIDEMIA ASSOCIATED WITH TYPE 2 DIABETES MELLITUS (HCC): ICD-10-CM

## 2022-05-09 DIAGNOSIS — E66.01 MORBID OBESITY DUE TO EXCESS CALORIES (HCC): ICD-10-CM

## 2022-05-09 DIAGNOSIS — I10 ESSENTIAL HYPERTENSION: ICD-10-CM

## 2022-05-09 DIAGNOSIS — E78.5 DYSLIPIDEMIA ASSOCIATED WITH TYPE 2 DIABETES MELLITUS (HCC): ICD-10-CM

## 2022-05-09 DIAGNOSIS — E11.9 TYPE 2 DIABETES MELLITUS WITHOUT COMPLICATION, WITHOUT LONG-TERM CURRENT USE OF INSULIN (HCC): ICD-10-CM

## 2022-05-09 PROCEDURE — 99214 OFFICE O/P EST MOD 30 MIN: CPT | Performed by: INTERNAL MEDICINE

## 2022-05-09 PROCEDURE — 3046F HEMOGLOBIN A1C LEVEL >9.0%: CPT | Performed by: INTERNAL MEDICINE

## 2022-05-09 RX ORDER — INSULIN GLARGINE 300 U/ML
INJECTION, SOLUTION SUBCUTANEOUS
Qty: 3 PEN | Refills: 3 | Status: SHIPPED | OUTPATIENT
Start: 2022-05-09

## 2022-05-09 NOTE — PROGRESS NOTES
Patient ID:   Tamar Mukherjee is a 47 y.o. male    Chief Complaint:   Tamar Mukherjee presents for an evaluation of Type 2 Diabetes Mellitus , Hyperlipidemia and hypertension. Subjective:   Type 2 Diabetes Mellitus diagnosed in   On insulin since 4609   Trulicity caused severe GI side effects   Invokana caused urinary issues      Metformin 1000MG bid. Missing some even doses   Toujeo 32 units daily at 6am. No TTT. Finished [employer]. Will do his [place] license  test.     Eating erratic. A lot of poor choices as per him. Working on diet (mor evegetable, fruits, baking), exercise , cutting down alcohol (on weekends only now)     Active at second job, 4 hours from 5-9pm. Always walking. Not checking sugars as he lost his meter. Checks blood sugars 2 times per day. Reviewed    AM:     231-087  Lunch: 132-180  Supper:   HS:  114-220    Hypoglycemias: None     Meals Three dinner is bigger. Chips 2 bags per day, 90 osbaldo each. No sodas, some juice or regular tea. Exercise: Walking around plant, may be 2 miles per day. Bike around the neighborhood twice a week. 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      Family history of CAD: sister  of CAD at age 61   Denies smoking   On low dose Aspirin    The following portions of the patient's history were reviewed and updated as appropriate:       Family History   Problem Relation Age of Onset    Diabetes Mother     Diabetes Sister     Diabetes Brother     Diabetes Brother          Social History     Socioeconomic History    Marital status: Single     Spouse name: Not on file    Number of children: Not on file    Years of education: Not on file    Highest education level: Not on file   Occupational History    Not on file   Tobacco Use    Smoking status: Never Smoker    Smokeless tobacco: Never Used   Vaping Use    Vaping Use: Never used   Substance and Sexual Activity    Alcohol use: Yes     Comment: social    Drug use: No    Sexual activity: Yes   Other Topics Concern    Not on file   Social History Narrative    Not on file     Social Determinants of Health     Financial Resource Strain: Low Risk     Difficulty of Paying Living Expenses: Not hard at all   Food Insecurity: No Food Insecurity    Worried About Running Out of Food in the Last Year: Never true    [address] in the Last Year: Never true   Transportation Needs:     Lack of Transportation (Medical): Not on file    Lack of Transportation (Non-Medical): Not on file   Physical Activity:     Days of Exercise per Week: Not on file    Minutes of Exercise per Session: Not on file   Stress:     Feeling of Stress : Not on file   Social Connections:     Frequency of Communication with Friends and Family: Not on file    Frequency of Social Gatherings with Friends and Family: Not on file    Attends Restoration Services: Not on file    Active Member of [address] [place] [address] or Organizations: Not on file    Attends Club or Organization Meetings: Not on file    Marital Status: Not on file   Intimate Partner Violence:     Fear of Current or Ex-Partner: Not on file    Emotionally Abused: Not on file    Physically Abused: Not on file    Sexually Abused: Not on file   Housing Stability:     Unable to Pay for Housing in the Last Year: Not on file    Number of [place] in the Last Year: Not on file    Unstable Housing in the Last Year: Not on file       Past Medical History:   Diagnosis Date    Diabetes mellitus ([facility] [address]     ED (erectile dysfunction) 1/31/2013    Essential hypertension 9/10/2018    Hyperlipidemia LDL goal < 100 1/31/2013    Morbid obesity due to excess calories ([address] 11/19/2018       History reviewed. No pertinent surgical history.       Allergies   Allergen Reactions    Food      shrimp         Current Outpatient Medications:     TOUJEO SOLOSTAR 300 UNIT/ML SOPN, INJECT 35 UNITS SUBCUTANEOUSLY IN THE MORNING, Disp: 3 pen, Rfl: 3    atorvastatin (LIPITOR) 40 MG tablet, Take 1 tablet by mouth once daily, Disp: 90 tablet, Rfl: 3    tadalafil (CIALIS) 20 MG tablet, Take 1 tablet by mouth daily as needed for Erectile Dysfunction, Disp: 15 tablet, Rfl: 1    Blood Glucose Monitoring Suppl (BLOOD GLUCOSE MONITOR SYSTEM) w/Device KIT, To check blood sugar 3 times per day, Disp: 1 kit, Rfl: 0    Lancets MISC, To check blood sugar 3 times per day, Disp: 100 each, Rfl: 11    blood glucose monitor strips, To check blood sugar 3 times per day, Disp: 100 strip, Rfl: 11    metFORMIN (GLUCOPHAGE) 1000 MG tablet, Take 1 tablet by mouth twice daily with food, Disp: 180 tablet, Rfl: 3    Insulin Pen Needle 31G X 8 MM MISC, 1 each by Does not apply route daily, Disp: 100 each, Rfl: 3    aspirin 81 MG tablet, Take 81 mg by mouth daily (Patient not taking: Reported on 5/9/2022), Disp: , Rfl:       Review of Systems:    Constitutional: Negative for fever, chills, and unexpected weight change. HENT: Negative for congestion, ear pain, rhinorrhea,  sore throat and trouble swallowing. Eyes: Negative for photophobia, redness, itching. Respiratory: Negative for cough, shortness of breath and sputum. Cardiovascular: Negative for chest pain, palpitations and leg swelling. Gastrointestinal: Negative for nausea, vomiting, abdominal pain, diarrhea, constipation. Endocrine: Negative for cold intolerance, heat intolerance, polydipsia, polyphagia and polyuria. Genitourinary: Negative for dysuria, urgency, frequency, hematuria and flank pain. Musculoskeletal: Negative for myalgias, back pain, arthralgias and neck pain. Skin/Nail: Negative for rash, itching. Normal nails. Neurological: Negative for seizures, weakness, light-headedness, numbness and headaches. Hematological/ Lymph nodes: Negative for adenopathy. Does not bruise/bleed easily.    Psychiatric/Behavioral: Negative for suicidal ideas, depression, anxiety, sleep disturbance and decreased concentration. Objective:   Physical Exam:  /81 (Site: Left Upper Arm, Position: Sitting, Cuff Size: Large Adult)   Pulse 84   Temp 98 °F (36.7 °C)   Ht 5' 11\" (1.803 m)   Wt 226 lb 6.4 oz (102.7 kg)   SpO2 97%   BMI 31.58 kg/m²     Constitutional: Patient is oriented to person, place, and time. Patient appears well-developed and well-nourished. HENT:               VDZW: Normocephalic and atraumatic.                Eyes: Conjunctivae and EOM are normal.                Neck: Normal range of motion. Thyroid normal.   Cardiovascular: Normal rate, regular rhythm and normal heart sounds.    Pulmonary/Chest: Effort normal and breath sounds normal.   Musculoskeletal: Normal range of motion. Neurological: Patient is alert and oriented to person, place, and time. Patient has normal reflexes. Skin: Skin is warm and dry. Psychiatric: Patient has a normal mood and affect.  Patient behavior is normal.       Lab Review:    Orders Only on 04/14/2022   Component Date Value Ref Range Status    Fructosamine 04/14/2022 347* 205 - 285 umol/L Final    Hemoglobin A1C 04/14/2022 9.8  See comment % Final    eAG 04/14/2022 234.6  mg/dL Final   Orders Only on 01/12/2022   Component Date Value Ref Range Status    Fructosamine 01/12/2022 375* 170 - 285 umol/L Final    Microalbumin, Random Urine 01/12/2022 14.00* <2.0 mg/dL Final    Creatinine, Ur 01/12/2022 124.9  39.0 - 259.0 mg/dL Final    Microalbumin Creatinine Ratio 01/12/2022 112.1* 0.0 - 30.0 mg/g Final    Hemoglobin A1C 01/12/2022 9.6  See comment % Final    eAG 01/12/2022 228.8  mg/dL Final   Orders Only on 10/27/2021   Component Date Value Ref Range Status    PSA 10/27/2021 0.87  0.00 - 4.00 ng/mL Final   Orders Only on 10/06/2021   Component Date Value Ref Range Status    Fructosamine 10/06/2021 345* 170 - 285 umol/L Final    Hemoglobin A1C 10/06/2021 9.2  See comment % Final    eAG 10/06/2021 217.3  mg/dL Final Orders Only on 06/09/2021   Component Date Value Ref Range Status    Hep C Ab Interp 06/09/2021 Non-reactive  Non-reactive Final   Orders Only on 06/09/2021   Component Date Value Ref Range Status    Microalbumin, Random Urine 06/09/2021 8.90* <2.0 mg/dL Final    Creatinine, Ur 06/09/2021 145.1  39.0 - 259.0 mg/dL Final    Microalbumin Creatinine Ratio 06/09/2021 61.3* 0.0 - 30.0 mg/g Final    Cholesterol, Fasting 06/09/2021 121  0 - 199 mg/dL Final    Triglyceride, Fasting 06/09/2021 218* 0 - 150 mg/dL Final    HDL 06/09/2021 35* 40 - 60 mg/dL Final    LDL Calculated 06/09/2021 42  <100 mg/dL Final    VLDL Cholesterol Calculated 06/09/2021 44  Not Established mg/dL Final    Fructosamine 06/09/2021 373* 170 - 285 umol/L Final    Hemoglobin A1C 06/09/2021 9.4  See comment % Final    eAG 06/09/2021 223.1  mg/dL Final           Assessment and Plan     Bob Reyes was seen today for follow-up and diabetes. Diagnoses and all orders for this visit:    Uncontrolled type 2 diabetes mellitus with microalbuminuria, with long-term current use of insulin (HCC)  -     Fructosamine; Future  -     Comprehensive Metabolic Panel; Future  -     Lipid, Fasting; Future  -      DIABETES FOOT EXAM  -     TOUJEO SOLOSTAR 300 UNIT/ML SOPN; INJECT 35 UNITS SUBCUTANEOUSLY IN THE MORNING    Dyslipidemia associated with type 2 diabetes mellitus (HCC)  -     Fructosamine; Future  -     Comprehensive Metabolic Panel; Future  -     Lipid, Fasting; Future  -      DIABETES FOOT EXAM  -     TOUJEO SOLOSTAR 300 UNIT/ML SOPN; INJECT 35 UNITS SUBCUTANEOUSLY IN THE MORNING    Type 2 diabetes mellitus without complication, without long-term current use of insulin (HCC)  -     Fructosamine; Future  -     Comprehensive Metabolic Panel; Future  -     Lipid, Fasting;  Future  -      DIABETES FOOT EXAM  -     TOUJEO SOLOSTAR 300 UNIT/ML SOPN; INJECT 35 UNITS SUBCUTANEOUSLY IN THE MORNING    Essential hypertension  -     Fructosamine; Future  -     Comprehensive Metabolic Panel; Future  -     Lipid, Fasting; Future  -     TOUJEO SOLOSTAR 300 UNIT/ML SOPN; INJECT 35 UNITS SUBCUTANEOUSLY IN THE MORNING    Morbid obesity due to excess calories (HCC)  -     Fructosamine; Future  -     Comprehensive Metabolic Panel; Future  -     Lipid, Fasting; Future  -     TOUJEO SOLOSTAR 300 UNIT/ML SOPN; INJECT 35 UNITS SUBCUTANEOUSLY IN THE MORNING          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 Better than A1C of 9.2% - Oct 2021   Fructosamine 375, eq to A1C of 9.3%. consistent with A1C of 9.6%. Fructosamine 347 eq to A1C of 8.6%. A1C at same time 9.8% - April 2022 Feb 2021: An abnormal band is present in the \"S\" position on both the alkaline and acid gels, accounting for 36.9% of the total hemoglobin, consistent with sickle cell trait. Genetic counseling done     Will only check Fructosamine now     Need to work on the diet     Metformin 1000MG BID breakfast and lunch   Change Toujeo to 34 units daily at 6am     Need to cut down beer     All instructions provided in written. Check Blood sugars 2-3 times per day. Log them along with insulin and send them every 2 weeks. Call for blood sugars less than 60 or more than 400. Eye exam: Last exam in Feb 2022, denies  Foot exam: May 2022     Deformity/amputation: absent  Skin lesions/pre-ulcerative calluses: absent  Edema: right- negative, left- negative  Sensory exam: Monofilament sensation: normal  Pulses: normal, Vibration (128 Hz): Intact    Renal screen: 87, Jan 2020 > 64 April 2020 > 43 July 2020 > 59 Nov 2020 > 61 June 2021 > 112 Jan 2022. For proteinuria, need to control DM and lose weight.      TSH screen: Jan 2017     2: HTN   Controlled with out meds   May add lisinopril for continued proteinuria . She does not want to do any more medicine. Also need to improve diabetes control     3: Hyperlipidemia   LDL: 42, HDL: 35, TGs: 218 - June 2021      Atorvastatin 40mg daily     4: Morbid obesity   Need to work on diet, exercise and lose weight     RTC in 3 months Fructosamine, lipids, CMP        EDUCATION:   Greater than 50% of this follow-up visit was spent in general counseling regarding   obesity, diet, exercise, importance of adherence to insulin regime, recognition and treatment of hypo and hyperglycemia,  glucose logging, proper diabetes management, diabetic complications with poor management and the importance of glycemic control in order to avoid the complications of diabetes. Risks and potential complications of diabetes were reviewed with the patient. Diabetes health maintenance plan and follow-up were discussed and understood by the patient. We reviewed the importance of medication compliance and regular follow-up. Aggressive lifestyle modification was encouraged. Exercise Counselling: This patient is a candidate for regular physical exercise. Instructions to perform the following types of exercise:  Swimming or water aerobic exercise  Brisk walking  Playing tennis  Stationary bicycle or elliptical indoor  Low impact aerobic exercise    Instructions given to exercise for the following duration:  30 minutes a day for five-seven days per week.     Following instructions for being active throughout the day in addition to formal exercise:  Walk instead of drive whenever possible  Take the stairs instead of the elevator  Work in the garden  Park to the far end of the parking lot to add more walking steps to destination      Electronically signed by Magdalena Flores MD on 5/9/2022 at 4:26 PM

## 2022-06-21 ENCOUNTER — TELEPHONE (OUTPATIENT)
Dept: PRIMARY CARE CLINIC | Age: 52
End: 2022-06-21

## 2022-06-21 NOTE — TELEPHONE ENCOUNTER
Medication:   Requested Prescriptions      No prescriptions requested or ordered in this encounter        Last Filled:      Patient Phone Number: [phone] (home) [phone] (work)    Last appt: 3/22/2022   Next appt: Visit date not found    Last OARRS: No flowsheet data found.

## 2022-06-22 DIAGNOSIS — N52.9 ERECTILE DYSFUNCTION, UNSPECIFIED ERECTILE DYSFUNCTION TYPE: ICD-10-CM

## 2022-06-22 RX ORDER — SILDENAFIL 100 MG/1
TABLET, FILM COATED ORAL
Qty: 10 TABLET | Refills: 0 | Status: SHIPPED | OUTPATIENT
Start: 2022-06-22 | End: 2022-07-15 | Stop reason: SDUPTHER

## 2022-07-12 ENCOUNTER — HOSPITAL ENCOUNTER (EMERGENCY)
Age: 52
Discharge: HOME | End: 2022-07-12
Payer: COMMERCIAL

## 2022-07-12 VITALS
SYSTOLIC BLOOD PRESSURE: 158 MMHG | HEART RATE: 90 BPM | WEIGHT: 226 LBS | HEIGHT: 71 IN | BODY MASS INDEX: 31.64 KG/M2 | TEMPERATURE: 98.2 F | OXYGEN SATURATION: 97 % | RESPIRATION RATE: 20 BRPM | DIASTOLIC BLOOD PRESSURE: 79 MMHG

## 2022-07-12 DIAGNOSIS — L23.7 ALLERGIC CONTACT DERMATITIS DUE TO PLANTS, EXCEPT FOOD: Primary | ICD-10-CM

## 2022-07-12 PROCEDURE — 6360000002 HC RX W HCPCS: Performed by: PHYSICIAN ASSISTANT

## 2022-07-12 PROCEDURE — 99284 EMERGENCY DEPT VISIT MOD MDM: CPT

## 2022-07-12 PROCEDURE — 96372 THER/PROPH/DIAG INJ SC/IM: CPT

## 2022-07-12 RX ORDER — METHYLPREDNISOLONE SODIUM SUCCINATE 125 MG/2ML
60 INJECTION, POWDER, LYOPHILIZED, FOR SOLUTION INTRAMUSCULAR; INTRAVENOUS ONCE
Status: COMPLETED | OUTPATIENT
Start: 2022-07-12 | End: 2022-07-12

## 2022-07-12 RX ORDER — METHYLPREDNISOLONE 4 MG/1
TABLET ORAL
Qty: 1 KIT | Refills: 0 | Status: SHIPPED | OUTPATIENT
Start: 2022-07-12 | End: 2022-07-18 | Stop reason: ALTCHOICE

## 2022-07-12 RX ADMIN — METHYLPREDNISOLONE SODIUM SUCCINATE 60 MG: 125 INJECTION, POWDER, FOR SOLUTION INTRAMUSCULAR; INTRAVENOUS at 20:10

## 2022-07-12 NOTE — Clinical Note
Regan Medina was seen and treated in our emergency department on 7/12/2022. He may return to work on 07/13/2022. If you have any questions or concerns, please don't hesitate to call.       JONNATHAN Sutton

## 2022-07-13 NOTE — ED PROVIDER NOTES
[address] [facility] Name: Vaibhav Porras  MRN: [[id number]]  [place] [id number]  Date of evaluation: 7/12/2022  Provider: JONNATHAN Ludwig  PCP: Tyron Hodge MD  Note Started: 8:22 PM EDT       SHERRI. I have evaluated this patient. My supervising physician was available for consultation. CHIEF COMPLAINT       Chief Complaint   Patient presents with   [facility]     was cleaning a fence line on 7/4/22; multiple rash sites on body; external cream not helping       HISTORY OF PRESENT ILLNESS   (Location, Timing/Onset, Context/Setting, Quality, Duration, Modifying Factors, Severity, Associated Signs and Symptoms)  Note limiting factors. Chief Complaint: Rash    Vaibhav Porras is a 47 y.o. male who presents presents the emergency department with rash on his bilateral lower legs bilateral arms and torso since 6 days ago. Patient states that he was cutting down bushes around his fence line on July 4 and 2 days later he developed this itchy rash. Patient states that he has tried over-the-counter calamine lotion and anti-itch cream with little to no relief and the rash spread throughout his body. Patient states that he does not have any mouth tongue lip or throat swelling. Patient denies any fevers or chills. Patient denies any shortness of breath or wheezing. Patient is asking for steroid medication at this time due to the intense itching that he is having. Discussed with patient about steroid medications and having diabetes. Patient states that he checks his blood sugar daily and that he can check it while he is on a steroid medication. Advised patient to check his blood sugar at least twice a day and if his blood sugar is elevated over 250 to stop the medication and or return to the emergency department. Patient understands this.     Nursing Notes were all reviewed and agreed with or any disagreements were addressed in the HPI. REVIEW OF SYSTEMS    (2-9 systems for level 4, 10 or more for level 5)     Review of Systems    Positives and Pertinent negatives as per HPI. Except as noted above in the ROS, all other systems were reviewed and negative. PAST MEDICAL HISTORY     Past Medical History:   Diagnosis Date    Diabetes mellitus ([facility] [address]     ED (erectile dysfunction) 1/31/2013    Essential hypertension 9/10/2018    Hyperlipidemia LDL goal < 100 1/31/2013    Morbid obesity due to excess calories ([address] 11/19/2018         SURGICAL HISTORY   No past surgical history on file.       CURRENTMEDICATIONS       Previous Medications    ASPIRIN 81 MG TABLET    Take 81 mg by mouth daily    ATORVASTATIN (LIPITOR) 40 MG TABLET    Take 1 tablet by mouth once daily    BLOOD GLUCOSE MONITOR STRIPS    To check blood sugar 3 times per day    BLOOD GLUCOSE MONITORING SUPPL (BLOOD GLUCOSE MONITOR SYSTEM) W/DEVICE KIT    To check blood sugar 3 times per day    INSULIN PEN NEEDLE 31G X 8 MM MISC    1 each by Does not apply route daily    LANCETS MISC    To check blood sugar 3 times per day    METFORMIN (GLUCOPHAGE) 1000 MG TABLET    Take 1 tablet by mouth twice daily with food    SILDENAFIL (VIAGRA) 100 MG TABLET    TAKE 1 TABLET BY MOUTH ONCE DAILY AS NEEDED    TADALAFIL (CIALIS) 20 MG TABLET    Take 1 tablet by mouth daily as needed for Erectile Dysfunction    TOUJEO SOLOSTAR 300 UNIT/ML SOPN    INJECT 35 UNITS SUBCUTANEOUSLY IN THE MORNING         ALLERGIES     Food    FAMILYHISTORY       Family History   Problem Relation Age of Onset    Diabetes Mother     Diabetes Sister     Diabetes Brother     Diabetes Brother           SOCIAL HISTORY       Social History     Tobacco Use    Smoking status: Never Smoker    Smokeless tobacco: Never Used   Vaping Use    Vaping Use: Never used   Substance Use Topics    Alcohol use: Yes     Comment: social    Drug use: No       SCREENINGS             PHYSICAL EXAM    (up to 7 for level 4, 8 or more for level 5)     ED Triage Vitals [07/12/22 1959]   BP Temp Temp Source Heart Rate Resp SpO2 Height Weight   (!) 158/79 98.2 °F (36.8 °C) Oral 90 20 97 % 5' 11\" (1.803 m) 226 lb (102.5 kg)       Physical Exam  Vitals and nursing note reviewed. Constitutional:       General: He is not in acute distress. Appearance: He is obese. He is not ill-appearing. Cardiovascular:      Pulses: Normal pulses. Heart sounds: Normal heart sounds. Pulmonary:      Effort: Pulmonary effort is normal. No respiratory distress. Breath sounds: Normal breath sounds. No stridor. No wheezing. Skin:     Comments: Diffuse erythematous rash linear induration over the lower legs and upper arms as well as on torso. Very consistent with contact dermatitis due to plants such as poison ivy. Neurological:      Mental Status: He is alert and oriented to person, place, and time. Psychiatric:         Mood and Affect: Mood normal.         Behavior: Behavior normal.         DIAGNOSTIC RESULTS   LABS:    Labs Reviewed - No data to display    When ordered only abnormal lab results are displayed. All other labs were within normal range or not returned as of this dictation. EKG: When ordered, EKG's are interpreted by the Emergency Department Physician in the absence of a cardiologist.  Please see their note for interpretation of EKG. RADIOLOGY:   Non-plain film images such as CT, Ultrasound and MRI are read by the radiologist. Plain radiographic images are visualized and preliminarily interpreted by the ED Provider with the below findings:        Interpretation per the Radiologist below, if available at the time of this note:    No orders to display     No results found.         PROCEDURES   Unless otherwise noted below, none     Procedures    CRITICAL CARE TIME       CONSULTS:  None      EMERGENCY DEPARTMENT COURSE and DIFFERENTIAL DIAGNOSIS/MDM:   Vitals:    Vitals:    07/12/22 1959   BP: (!) 158/79   Pulse: 90   Resp: 20 Temp: 98.2 °F (36.8 °C)   TempSrc: Oral   SpO2: 97%   Weight: 226 lb (102.5 kg)   Height: 5' 11\" (1.803 m)       Patient was given the following medications:  Medications   methylPREDNISolone sodium (SOLU-MEDROL) injection 60 mg (60 mg IntraMUSCular Given 7/12/22 2010)         Is this patient to be included in the SEP-1 Core Measure due to severe sepsis or septic shock? No   Exclusion criteria - the patient is NOT to be included for SEP-1 Core Measure due to: Infection is not suspected    62-year-old male presents emergency department due to diffuse rash on arms legs and torso after doing yard work 2 days prior to rash starting. Patient states rash is very itchy on exam.  Parents it is a contact dermatitis due to some type of plant such as poison ivy. They do not see any signs of infection at this time. Patient has tried over-the-counter medications with no relief. Patient is wanting steroid medication. Discussed with the patient at length about the risk of elevated blood sugar due to diabetes and steroids he understands this but still wants a steroid medication. I advised patient to check his blood sugars at least twice daily and if it is elevated over 250 that he should come to the emergency department and stop the medication. Patient understands and agrees to this plan all questions were answered at time of discharge. Low suspicion for angioedema, anaphylaxis, cellulitis, abscess, folliculitis, necrotizing fasciitis, SJS, HSP, TEN, scabies, bedbugs, varicella, milia, tinea, erysipelas, scalded skin syndrome, meningococcemia, occult bacteremia or other emergent etiology at this time      FINAL IMPRESSION      1.  Allergic contact dermatitis due to plants, except food          DISPOSITION/PLAN   DISPOSITION Decision To Discharge 07/12/2022 08:09:04 PM      PATIENT REFERRED TO:  Jimmy To MD  [address] [facility]  [phone]    Schedule an appointment as soon as possible for a visit in 3 days  [facility] Emergency Department  [address] [facility]  [phone]    As needed, If symptoms worsen      DISCHARGE MEDICATIONS:  New Prescriptions    METHYLPREDNISOLONE (MEDROL, ALEKSANDRA,) 4 MG TABLET    By mouth.        DISCONTINUED MEDICATIONS:  Discontinued Medications    No medications on file              (Please note that portions of this note were completed with a voice recognition program.  Efforts were made to edit the dictations but occasionally words are mis-transcribed.)    JONNATHAN Kelly (electronically signed)            Greg Kelly  07/12/22 2029

## 2022-07-14 DIAGNOSIS — N52.9 ERECTILE DYSFUNCTION, UNSPECIFIED ERECTILE DYSFUNCTION TYPE: ICD-10-CM

## 2022-07-15 RX ORDER — SILDENAFIL 100 MG/1
TABLET, FILM COATED ORAL
Qty: 10 TABLET | Refills: 5 | Status: SHIPPED | OUTPATIENT
Start: 2022-07-15

## 2022-07-15 NOTE — TELEPHONE ENCOUNTER
Medication:   Requested Prescriptions     Pending Prescriptions Disp Refills    sildenafil (VIAGRA) 100 MG tablet [Pharmacy Med Name: Sildenafil Citrate 100 MG Oral Tablet] 10 tablet 0     Sig: TAKE 1 TABLET BY MOUTH ONCE DAILY AS NEEDED        Last Filled:      Patient Phone Number: [phone] (home) [phone] (work)    Last appt: 3/22/2022   Next appt: Visit date not found    Last OARRS: No flowsheet data found.

## 2022-07-18 ENCOUNTER — OFFICE VISIT (OUTPATIENT)
Dept: PRIMARY CARE CLINIC | Age: 52
End: 2022-07-18
Payer: COMMERCIAL

## 2022-07-18 VITALS
WEIGHT: 220 LBS | HEIGHT: 71 IN | SYSTOLIC BLOOD PRESSURE: 129 MMHG | BODY MASS INDEX: 30.8 KG/M2 | TEMPERATURE: 98.2 F | DIASTOLIC BLOOD PRESSURE: 85 MMHG | OXYGEN SATURATION: 96 % | HEART RATE: 93 BPM

## 2022-07-18 DIAGNOSIS — R21 RASH AND NONSPECIFIC SKIN ERUPTION: Primary | ICD-10-CM

## 2022-07-18 PROCEDURE — 99213 OFFICE O/P EST LOW 20 MIN: CPT | Performed by: NURSE PRACTITIONER

## 2022-07-18 RX ORDER — HYDROXYZINE HYDROCHLORIDE 25 MG/1
25 TABLET, FILM COATED ORAL EVERY 8 HOURS PRN
Qty: 60 TABLET | Refills: 0 | Status: SHIPPED | OUTPATIENT
Start: 2022-07-18 | End: 2022-08-07

## 2022-07-18 ASSESSMENT — ENCOUNTER SYMPTOMS
COUGH: 0
VOMITING: 0
STRIDOR: 0
SHORTNESS OF BREATH: 0
WHEEZING: 0
RHINORRHEA: 0
NAIL CHANGES: 0
CHEST TIGHTNESS: 0
APNEA: 0

## 2022-07-18 ASSESSMENT — PATIENT HEALTH QUESTIONNAIRE - PHQ9
SUM OF ALL RESPONSES TO PHQ9 QUESTIONS 1 & 2: 0
2. FEELING DOWN, DEPRESSED OR HOPELESS: 0
SUM OF ALL RESPONSES TO PHQ QUESTIONS 1-9: 0
1. LITTLE INTEREST OR PLEASURE IN DOING THINGS: 0

## 2022-07-18 NOTE — PROGRESS NOTES
Erick Acosta (:  1970) is a 47 y.o. male,Established patient, here for evaluation of the following chief complaint(s):  Poison Ivy (Since  and getting worse after doing yard work)         ASSESSMENT/PLAN:  1. Rash and nonspecific skin eruption- contact dermatitis  -     hydrocortisone 2.5 % cream; Apply topically 2 times daily. , Disp-20 g, R-1, Normal  -     hydrOXYzine HCl (ATARAX) 25 MG tablet; Take 1 tablet by mouth every 8 hours as needed for Itching, Disp-60 tablet, R-0Normal  -schedule f/u with PCP  -keep clean, pat dry  -Avoid scratching  No follow-ups on file. Subjective   SUBJECTIVE/OBJECTIVE:  Rash  This is a new problem. The current episode started 1 to 4 weeks ago. The affected locations include the left arm, right arm, left lower leg, right lower leg and chest. The rash is characterized by dryness, itchiness and redness. He was exposed to nothing. Pertinent negatives include no congestion, cough, fatigue, joint pain, nail changes, rhinorrhea, shortness of breath or vomiting. Past treatments include oral steroids (medrol guillaume). The treatment provided mild relief. Working in yard on , rash noticed on . Rash on arms, legs, and trunk. Reporting itching. Denies new detergents, soaps, lotions. No sick contacts, no other persons in home with rash. Denies swelling of tongue, face, lips. Completed Medrol-guillaume prescribed by Dr. Jose Benavides. Requesting refill of Atarax, which is effective. Review of Systems   Constitutional:  Negative for fatigue. HENT:  Negative for congestion and rhinorrhea. Respiratory:  Negative for apnea, cough, chest tightness, shortness of breath, wheezing and stridor. Cardiovascular:  Negative for chest pain, palpitations and leg swelling. Gastrointestinal:  Negative for vomiting. Musculoskeletal:  Negative for joint pain. Skin:  Positive for rash. Negative for nail changes. Neurological:  Negative for dizziness.         Objective    height is 5' 11\" (1.803 m) and weight is 220 lb (99.8 kg). His temperature is 98.2 °F (36.8 °C). His blood pressure is 129/85 and his pulse is 93. His oxygen saturation is 96%. Wt Readings from Last 3 Encounters:   07/18/22 220 lb (99.8 kg)   07/12/22 226 lb (102.5 kg)   05/09/22 226 lb 6.4 oz (102.7 kg)      Patient Active Problem List   Diagnosis    Insect Bite    Hymenoptera sting    Hyperlipidemia with target LDL less than 100    ED (erectile dysfunction)    Rash and nonspecific skin eruption    Folliculitis    Testicular nodule    Itching    Uncontrolled type 2 diabetes mellitus with microalbuminuria, with long-term current use of insulin ([address]    Dyslipidemia associated with type 2 diabetes mellitus ([address]    Essential hypertension    Morbid obesity due to excess calories (HCC)    Left flank pain    Elevated liver enzymes    Acute right flank pain    Sickle cell trait ([address]    Type 2 diabetes mellitus without complication, without long-term current use of insulin ([address]      Outpatient Encounter Medications as of 7/18/2022   Medication Sig Dispense Refill    hydrocortisone 2.5 % cream Apply topically 2 times daily.  20 g 1    hydrOXYzine HCl (ATARAX) 25 MG tablet Take 1 tablet by mouth every 8 hours as needed for Itching 60 tablet 0    sildenafil (VIAGRA) 100 MG tablet TAKE 1 TABLET BY MOUTH ONCE DAILY AS NEEDED 10 tablet 5    TOUJEO SOLOSTAR 300 UNIT/ML SOPN INJECT 35 UNITS SUBCUTANEOUSLY IN THE MORNING (Patient taking differently: Inject 35 Units into the skin daily INJECT 35 UNITS SUBCUTANEOUSLY IN THE MORNING) 3 pen 3    atorvastatin (LIPITOR) 40 MG tablet Take 1 tablet by mouth once daily 90 tablet 3    tadalafil (CIALIS) 20 MG tablet Take 1 tablet by mouth daily as needed for Erectile Dysfunction 15 tablet 1    Blood Glucose Monitoring Suppl (BLOOD GLUCOSE MONITOR SYSTEM) w/Device KIT To check blood sugar 3 times per day 1 kit 0    Lancets MISC To check blood sugar 3 times per day 100 each 11    blood glucose monitor strips To check blood sugar 3 times per day 100 strip 11    metFORMIN (GLUCOPHAGE) 1000 MG tablet Take 1 tablet by mouth twice daily with food 180 tablet 3    aspirin 81 MG tablet Take 81 mg by mouth in the morning. Insulin Pen Needle 31G X 8 MM MISC 1 each by Does not apply route daily 100 each 3    [DISCONTINUED] methylPREDNISolone (MEDROL, ALEKSANDRA,) 4 MG tablet By mouth. 1 kit 0    [DISCONTINUED] sildenafil (VIAGRA) 100 MG tablet TAKE ONE TABLET BY MOUTH AS NEEDED FOR ERECTILE DYSFUNCTION 10 tablet 5     No facility-administered encounter medications on file as of 7/18/2022. Physical Exam  Skin:     Findings: Erythema and rash present. Rash is macular, papular and urticarial.             Comments: No drainage, bleeding. Multiple pruritic eruptions, erythematous base, areas of hyperpigmentation. No edema          On this date 7/18/2022 I have spent 15 minutes reviewing previous notes, test results and face to face with the patient discussing the diagnosis and importance of compliance with the treatment plan as well as documenting on the day of the visit. An electronic signature was used to authenticate this note.     --Mamie Kaur, DESTINY - CNP

## 2022-07-19 DIAGNOSIS — E66.01 MORBID OBESITY DUE TO EXCESS CALORIES (HCC): ICD-10-CM

## 2022-07-19 DIAGNOSIS — E78.5 DYSLIPIDEMIA ASSOCIATED WITH TYPE 2 DIABETES MELLITUS (HCC): ICD-10-CM

## 2022-07-19 DIAGNOSIS — E11.9 TYPE 2 DIABETES MELLITUS WITHOUT COMPLICATION, WITHOUT LONG-TERM CURRENT USE OF INSULIN (HCC): Chronic | ICD-10-CM

## 2022-07-19 DIAGNOSIS — I10 ESSENTIAL HYPERTENSION: ICD-10-CM

## 2022-07-19 DIAGNOSIS — E11.69 DYSLIPIDEMIA ASSOCIATED WITH TYPE 2 DIABETES MELLITUS (HCC): ICD-10-CM

## 2022-07-19 NOTE — TELEPHONE ENCOUNTER
Requested Refill:   Requested Prescriptions     Pending Prescriptions Disp Refills    metFORMIN (GLUCOPHAGE) 1000 MG tablet [Pharmacy Med Name: metFORMIN HCl 1000 MG Oral Tablet] 180 tablet 0     Sig: Take 1 tablet by mouth twice daily with food       Patient Phone Number:  [phone] (home) [phone] (work)    Last Appt: 5/9/2022  Next Appt: 8/8/2022

## 2022-09-14 DIAGNOSIS — E11.69 DYSLIPIDEMIA ASSOCIATED WITH TYPE 2 DIABETES MELLITUS (HCC): ICD-10-CM

## 2022-09-14 DIAGNOSIS — E66.01 MORBID OBESITY DUE TO EXCESS CALORIES (HCC): ICD-10-CM

## 2022-09-14 DIAGNOSIS — I10 ESSENTIAL HYPERTENSION: ICD-10-CM

## 2022-09-14 DIAGNOSIS — E11.9 TYPE 2 DIABETES MELLITUS WITHOUT COMPLICATION, WITHOUT LONG-TERM CURRENT USE OF INSULIN (HCC): Chronic | ICD-10-CM

## 2022-09-14 DIAGNOSIS — E78.5 DYSLIPIDEMIA ASSOCIATED WITH TYPE 2 DIABETES MELLITUS (HCC): ICD-10-CM

## 2022-09-14 NOTE — TELEPHONE ENCOUNTER
Requested Prescriptions     Pending Prescriptions Disp Refills    metFORMIN (GLUCOPHAGE) 1000 MG tablet 180 tablet 0     Sig: Take 1 tablet by mouth twice daily with food

## 2022-09-14 NOTE — TELEPHONE ENCOUNTER
Pt requesting refill   metFORMIN (GLUCOPHAGE) 1000 MG tablet   TOUJEO SOLOSTAR 300 UNIT/ML [facility], [place] [address]   Phone:  [phone]  Fax:  [phone]    Last OV: 5/9/2022  Next OV: 9/19/2022

## 2022-11-03 DIAGNOSIS — E11.69 DYSLIPIDEMIA ASSOCIATED WITH TYPE 2 DIABETES MELLITUS (HCC): ICD-10-CM

## 2022-11-03 DIAGNOSIS — E78.5 DYSLIPIDEMIA ASSOCIATED WITH TYPE 2 DIABETES MELLITUS (HCC): ICD-10-CM

## 2022-11-03 DIAGNOSIS — E66.01 MORBID OBESITY DUE TO EXCESS CALORIES (HCC): ICD-10-CM

## 2022-11-03 DIAGNOSIS — E11.9 TYPE 2 DIABETES MELLITUS WITHOUT COMPLICATION, WITHOUT LONG-TERM CURRENT USE OF INSULIN (HCC): ICD-10-CM

## 2022-11-03 DIAGNOSIS — I10 ESSENTIAL HYPERTENSION: ICD-10-CM

## 2022-11-03 LAB
A/G RATIO: 1.8 (ref 1.1–2.2)
ALBUMIN SERPL-MCNC: 4.8 G/DL (ref 3.4–5)
ALP BLD-CCNC: 49 U/L (ref 40–129)
ALT SERPL-CCNC: 45 U/L (ref 10–40)
ANION GAP SERPL CALCULATED.3IONS-SCNC: 13 MMOL/L (ref 3–16)
AST SERPL-CCNC: 32 U/L (ref 15–37)
BILIRUB SERPL-MCNC: <0.2 MG/DL (ref 0–1)
BUN BLDV-MCNC: 10 MG/DL (ref 7–20)
CALCIUM SERPL-MCNC: 9.8 MG/DL (ref 8.3–10.6)
CHLORIDE BLD-SCNC: 104 MMOL/L (ref 99–110)
CHOLESTEROL, FASTING: 152 MG/DL (ref 0–199)
CO2: 25 MMOL/L (ref 21–32)
CREAT SERPL-MCNC: 0.8 MG/DL (ref 0.9–1.3)
GFR SERPL CREATININE-BSD FRML MDRD: >60 ML/MIN/{1.73_M2}
GLUCOSE BLD-MCNC: 164 MG/DL (ref 70–99)
HDLC SERPL-MCNC: 37 MG/DL (ref 40–60)
LDL CHOLESTEROL CALCULATED: 59 MG/DL
POTASSIUM SERPL-SCNC: 4.7 MMOL/L (ref 3.5–5.1)
SODIUM BLD-SCNC: 142 MMOL/L (ref 136–145)
TOTAL PROTEIN: 7.5 G/DL (ref 6.4–8.2)
TRIGLYCERIDE, FASTING: 278 MG/DL (ref 0–150)
VLDLC SERPL CALC-MCNC: 56 MG/DL

## 2022-11-05 LAB — FRUCTOSAMINE: 383 UMOL/L (ref 205–285)

## 2022-11-07 ENCOUNTER — TELEPHONE (OUTPATIENT)
Dept: PRIMARY CARE CLINIC | Age: 52
End: 2022-11-07

## 2022-11-07 NOTE — TELEPHONE ENCOUNTER
PT called in regarding his lab work ordered by Dr. Bill Phelan. Informed PT of Dr. Quiana Nathan note.

## 2022-11-21 ENCOUNTER — OFFICE VISIT (OUTPATIENT)
Dept: ENDOCRINOLOGY | Age: 52
End: 2022-11-21
Payer: COMMERCIAL

## 2022-11-21 VITALS
TEMPERATURE: 98 F | WEIGHT: 226 LBS | HEIGHT: 71 IN | SYSTOLIC BLOOD PRESSURE: 118 MMHG | BODY MASS INDEX: 31.64 KG/M2 | HEART RATE: 87 BPM | OXYGEN SATURATION: 97 % | RESPIRATION RATE: 14 BRPM | DIASTOLIC BLOOD PRESSURE: 78 MMHG

## 2022-11-21 DIAGNOSIS — E11.65 POORLY CONTROLLED TYPE 2 DIABETES MELLITUS WITH NEUROPATHY (HCC): ICD-10-CM

## 2022-11-21 DIAGNOSIS — E66.01 MORBID OBESITY DUE TO EXCESS CALORIES (HCC): ICD-10-CM

## 2022-11-21 DIAGNOSIS — E11.69 DYSLIPIDEMIA ASSOCIATED WITH TYPE 2 DIABETES MELLITUS (HCC): ICD-10-CM

## 2022-11-21 DIAGNOSIS — I10 ESSENTIAL HYPERTENSION: ICD-10-CM

## 2022-11-21 DIAGNOSIS — E11.40 POORLY CONTROLLED TYPE 2 DIABETES MELLITUS WITH NEUROPATHY (HCC): ICD-10-CM

## 2022-11-21 DIAGNOSIS — E78.5 DYSLIPIDEMIA ASSOCIATED WITH TYPE 2 DIABETES MELLITUS (HCC): ICD-10-CM

## 2022-11-21 DIAGNOSIS — E11.9 TYPE 2 DIABETES MELLITUS WITHOUT COMPLICATION, WITHOUT LONG-TERM CURRENT USE OF INSULIN (HCC): ICD-10-CM

## 2022-11-21 PROCEDURE — 3046F HEMOGLOBIN A1C LEVEL >9.0%: CPT | Performed by: INTERNAL MEDICINE

## 2022-11-21 PROCEDURE — 3074F SYST BP LT 130 MM HG: CPT | Performed by: INTERNAL MEDICINE

## 2022-11-21 PROCEDURE — 3078F DIAST BP <80 MM HG: CPT | Performed by: INTERNAL MEDICINE

## 2022-11-21 PROCEDURE — 99214 OFFICE O/P EST MOD 30 MIN: CPT | Performed by: INTERNAL MEDICINE

## 2022-11-21 RX ORDER — INSULIN GLARGINE 300 U/ML
INJECTION, SOLUTION SUBCUTANEOUS
Qty: 5 ADJUSTABLE DOSE PRE-FILLED PEN SYRINGE | Refills: 3 | Status: SHIPPED | OUTPATIENT
Start: 2022-11-21

## 2022-11-21 RX ORDER — ATORVASTATIN CALCIUM 40 MG/1
TABLET, FILM COATED ORAL
Qty: 90 TABLET | Refills: 1 | Status: SHIPPED | OUTPATIENT
Start: 2022-11-21

## 2022-11-21 NOTE — PROGRESS NOTES
Yes     Comment: social    Drug use: No    Sexual activity: Yes   Other Topics Concern    Not on file   Social History Narrative    Not on file     Social Determinants of Health     Financial Resource Strain: Low Risk     Difficulty of Paying Living Expenses: Not hard at all   Food Insecurity: No Food Insecurity    Worried About Running Out of Food in the Last Year: Never true    Ran Out of Food in the Last Year: Never true   Transportation Needs: Not on file   Physical Activity: Not on file   Stress: Not on file   Social Connections: Not on file   Intimate Partner Violence: Not on file   Housing Stability: Not on file       Past Medical History:   Diagnosis Date    Diabetes mellitus ([facility]     ED (erectile dysfunction) 1/31/2013    Essential hypertension 9/10/2018    Hyperlipidemia LDL goal < 100 1/31/2013    Morbid obesity due to excess calories ([facility] 11/19/2018       History reviewed. No pertinent surgical history. Allergies   Allergen Reactions    Food      shrimp         Current Outpatient Medications:     TOUJEO SOLOSTAR 300 UNIT/ML concentrated injection pen, INJECT 35 UNITS SUBCUTANEOUSLY IN THE MORNING, Disp: 5 Adjustable Dose Pre-filled Pen Syringe, Rfl: 3    metFORMIN (GLUCOPHAGE) 1000 MG tablet, Take 1 tablet by mouth twice daily with food, Disp: 180 tablet, Rfl: 1    atorvastatin (LIPITOR) 40 MG tablet, Take 1 tablet by mouth once daily, Disp: 90 tablet, Rfl: 1    hydrocortisone 2.5 % cream, Apply topically 2 times daily. , Disp: 20 g, Rfl: 1    sildenafil (VIAGRA) 100 MG tablet, TAKE 1 TABLET BY MOUTH ONCE DAILY AS NEEDED, Disp: 10 tablet, Rfl: 5    Blood Glucose Monitoring Suppl (BLOOD GLUCOSE MONITOR SYSTEM) w/Device KIT, To check blood sugar 3 times per day, Disp: 1 kit, Rfl: 0    Lancets MISC, To check blood sugar 3 times per day, Disp: 100 each, Rfl: 11    blood glucose monitor strips, To check blood sugar 3 times per day, Disp: 100 strip, Rfl: 11    Insulin Pen Needle 31G X 8 MM MISC, 1 each by Does not apply route daily, Disp: 100 each, Rfl: 3    tadalafil (CIALIS) 20 MG tablet, Take 1 tablet by mouth daily as needed for Erectile Dysfunction (Patient not taking: Reported on 11/21/2022), Disp: 15 tablet, Rfl: 1    aspirin 81 MG tablet, Take 81 mg by mouth in the morning. (Patient not taking: Reported on 11/21/2022), Disp: , Rfl:       Review of Systems:    Constitutional: Negative for fever, chills, and unexpected weight change. HENT: Negative for congestion, ear pain, rhinorrhea,  sore throat and trouble swallowing. Eyes: Negative for photophobia, redness, itching. Respiratory: Negative for cough, shortness of breath and sputum. Cardiovascular: Negative for chest pain, palpitations and leg swelling. Gastrointestinal: Negative for nausea, vomiting, abdominal pain, diarrhea, constipation. Endocrine: Negative for cold intolerance, heat intolerance, polydipsia, polyphagia and polyuria. Genitourinary: Negative for dysuria, urgency, frequency, hematuria and flank pain. Musculoskeletal: Negative for myalgias, back pain, arthralgias and neck pain. Skin/Nail: Negative for rash, itching. Normal nails. Neurological: Negative for seizures, weakness, light-headedness, numbness and headaches. Hematological/ Lymph nodes: Negative for adenopathy. Does not bruise/bleed easily. Psychiatric/Behavioral: Negative for suicidal ideas, depression, anxiety, sleep disturbance and decreased concentration. Objective:   Physical Exam:  /78   Pulse 87   Temp 98 °F (36.7 °C)   Resp 14   Ht 5' 11\" (1.803 m)   Wt 226 lb (102.5 kg)   SpO2 97%   BMI 31.52 kg/m²     Constitutional: Patient is oriented to person, place, and time. Patient appears well-developed and well-nourished. HENT:               Head: Normocephalic and atraumatic. Eyes: Conjunctivae and EOM are normal.                Neck: Normal range of motion.  Thyroid normal.   Cardiovascular: Normal rate, regular rhythm and normal heart sounds. Pulmonary/Chest: Effort normal and breath sounds normal.   Musculoskeletal: Normal range of motion. Neurological: Patient is alert and oriented to person, place, and time. Patient has normal reflexes. Skin: Skin is warm and dry. Psychiatric: Patient has a normal mood and affect.  Patient behavior is normal.       Lab Review:    Orders Only on 11/03/2022   Component Date Value Ref Range Status    Cholesterol, Fasting 11/03/2022 152  0 - 199 mg/dL Final    Triglyceride, Fasting 11/03/2022 278 (A)  0 - 150 mg/dL Final    HDL 11/03/2022 37 (A)  40 - 60 mg/dL Final    LDL Calculated 11/03/2022 59  <100 mg/dL Final    VLDL Cholesterol Calculated 11/03/2022 56  Not Established mg/dL Final    Fructosamine 11/03/2022 383 (A)  205 - 285 umol/L Final    Sodium 11/03/2022 142  136 - 145 mmol/L Final    Potassium 11/03/2022 4.7  3.5 - 5.1 mmol/L Final    Chloride 11/03/2022 104  99 - 110 mmol/L Final    CO2 11/03/2022 25  21 - 32 mmol/L Final    Anion Gap 11/03/2022 13  3 - 16 Final    Glucose 11/03/2022 164 (A)  70 - 99 mg/dL Final    BUN 11/03/2022 10  7 - 20 mg/dL Final    Creatinine 11/03/2022 0.8 (A)  0.9 - 1.3 mg/dL Final    Est, Glom Filt Rate 11/03/2022 >60  >60 Final    Calcium 11/03/2022 9.8  8.3 - 10.6 mg/dL Final    Total Protein 11/03/2022 7.5  6.4 - 8.2 g/dL Final    Albumin 11/03/2022 4.8  3.4 - 5.0 g/dL Final    Albumin/Globulin Ratio 11/03/2022 1.8  1.1 - 2.2 Final    Total Bilirubin 11/03/2022 <0.2  0.0 - 1.0 mg/dL Final    Alkaline Phosphatase 11/03/2022 49  40 - 129 U/L Final    ALT 11/03/2022 45 (A)  10 - 40 U/L Final    AST 11/03/2022 32  15 - 37 U/L Final   Orders Only on 04/14/2022   Component Date Value Ref Range Status    Fructosamine 04/14/2022 347 (A)  205 - 285 umol/L Final    Hemoglobin A1C 04/14/2022 9.8  See comment % Final    eAG 04/14/2022 234.6  mg/dL Final   Orders Only on 01/12/2022   Component Date Value Ref Range Status    Fructosamine 01/12/2022 375 (A)  170 - 285 umol/L Final    Microalbumin, Random Urine 01/12/2022 14.00 (A)  <2.0 mg/dL Final    Creatinine, Ur 01/12/2022 124.9  39.0 - 259.0 mg/dL Final    Microalbumin Creatinine Ratio 01/12/2022 112.1 (A)  0.0 - 30.0 mg/g Final    Hemoglobin A1C 01/12/2022 9.6  See comment % Final    eAG 01/12/2022 228.8  mg/dL Final           Assessment and Plan     Disha Jacobsen was seen today for follow-up and diabetes. Diagnoses and all orders for this visit:    Poorly controlled type 2 diabetes mellitus with neuropathy (HCC)  -     TOUJEO SOLOSTAR 300 UNIT/ML concentrated injection pen; INJECT 35 UNITS SUBCUTANEOUSLY IN THE MORNING  -     metFORMIN (GLUCOPHAGE) 1000 MG tablet; Take 1 tablet by mouth twice daily with food  -     atorvastatin (LIPITOR) 40 MG tablet; Take 1 tablet by mouth once daily  -     Fructosamine; Future    Dyslipidemia associated with type 2 diabetes mellitus (HCC)  -     TOUJEO SOLOSTAR 300 UNIT/ML concentrated injection pen; INJECT 35 UNITS SUBCUTANEOUSLY IN THE MORNING  -     metFORMIN (GLUCOPHAGE) 1000 MG tablet; Take 1 tablet by mouth twice daily with food  -     atorvastatin (LIPITOR) 40 MG tablet; Take 1 tablet by mouth once daily  -     Fructosamine; Future    Type 2 diabetes mellitus without complication, without long-term current use of insulin (HCC)  -     TOUJEO SOLOSTAR 300 UNIT/ML concentrated injection pen; INJECT 35 UNITS SUBCUTANEOUSLY IN THE MORNING  -     metFORMIN (GLUCOPHAGE) 1000 MG tablet; Take 1 tablet by mouth twice daily with food  -     atorvastatin (LIPITOR) 40 MG tablet; Take 1 tablet by mouth once daily  -     Fructosamine; Future    Essential hypertension  -     TOUJEO SOLOSTAR 300 UNIT/ML concentrated injection pen; INJECT 35 UNITS SUBCUTANEOUSLY IN THE MORNING  -     metFORMIN (GLUCOPHAGE) 1000 MG tablet; Take 1 tablet by mouth twice daily with food  -     atorvastatin (LIPITOR) 40 MG tablet; Take 1 tablet by mouth once daily  -     Fructosamine;  Future    Morbid obesity due to excess calories (HCC)  -     TOUJEO SOLOSTAR 300 UNIT/ML concentrated injection pen; INJECT 35 UNITS SUBCUTANEOUSLY IN THE MORNING  -     metFORMIN (GLUCOPHAGE) 1000 MG tablet; Take 1 tablet by mouth twice daily with food  -     atorvastatin (LIPITOR) 40 MG tablet; Take 1 tablet by mouth once daily  -     Fructosamine; Future          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 Better than A1C of 9.2% - Oct 2021   Fructosamine 375, eq to A1C of 9.3%. consistent with A1C of 9.6%. Fructosamine 347 eq to A1C of 8.6%. A1C at same time 9.8% - April 2022   Fructosamine 383 eq to A1C of 9.6%      Feb 2021: An abnormal band is present in the \"S\" position on both the alkaline and acid gels, accounting for 36.9% of the total hemoglobin, consistent with sickle cell trait. Genetic counseling done     Will only check Fructosamine now     Need to work on the diet   Check sugars every day, 2 per day     Metformin 1000MG BID breakfast and lunch   C/w Toujeo 35 units daily at 6am     Need to stop beer or alcohol as ALT is high, Tgs are going up and DM remained out of control     All instructions provided in written. Check Blood sugars 2-3 times per day. Log them along with insulin and send them every 2 weeks. Call for blood sugars less than 60 or more than 400. Eye exam: Last exam in Feb 2022, denies  Foot exam: May 2022     Deformity/amputation: absent  Skin lesions/pre-ulcerative calluses: absent  Edema: right- negative, left- negative  Sensory exam: Monofilament sensation: normal  Pulses: normal, Vibration (128 Hz): Intact    Renal screen: 88, Jan 2020 > 64 April 2020 > 43 July 2020 > 59 Nov 2020 > 61 June 2021 > 112 Jan 2022. For proteinuria, need to control DM and lose weight. TSH screen: Jan 2017     2: HTN   Controlled with out meds   Discussed adding lisinopril for continued proteinuria . She does not want to do any more medicine. Also need to improve diabetes control     3: Hyperlipidemia   LDL: 59, HDL: 37, TGs: 278 - Nov 2022       C/w Atorvastatin 40mg daily     4: Morbid obesity   Need to work on diet, exercise and lose weight     RTC in 3 months Fructosamine       EDUCATION:   Greater than 50% of this follow-up visit was spent in general counseling regarding   obesity, diet, exercise, importance of adherence to insulin regime, recognition and treatment of hypo and hyperglycemia,  glucose logging, proper diabetes management, diabetic complications with poor management and the importance of glycemic control in order to avoid the complications of diabetes. Risks and potential complications of diabetes were reviewed with the patient. Diabetes health maintenance plan and follow-up were discussed and understood by the patient. We reviewed the importance of medication compliance and regular follow-up. Aggressive lifestyle modification was encouraged. Exercise Counselling: This patient is a candidate for regular physical exercise. Instructions to perform the following types of exercise:  Swimming or water aerobic exercise  Brisk walking  Playing tennis  Stationary bicycle or elliptical indoor  Low impact aerobic exercise    Instructions given to exercise for the following duration:  30 minutes a day for five-seven days per week.     Following instructions for being active throughout the day in addition to formal exercise:  Walk instead of drive whenever possible  Take the stairs instead of the elevator  Work in the garden  Park to the far end of the parking lot to add more walking steps to destination      Electronically signed by Linda Fernandez MD on 11/21/2022 at 10:54 AM

## 2022-11-22 ENCOUNTER — TELEPHONE (OUTPATIENT)
Dept: PRIMARY CARE CLINIC | Age: 52
End: 2022-11-22

## 2022-11-22 DIAGNOSIS — N52.9 ERECTILE DYSFUNCTION, UNSPECIFIED ERECTILE DYSFUNCTION TYPE: ICD-10-CM

## 2022-11-22 RX ORDER — SILDENAFIL 100 MG/1
TABLET, FILM COATED ORAL
Qty: 10 TABLET | Refills: 0 | OUTPATIENT
Start: 2022-11-22

## 2022-11-22 RX ORDER — SILDENAFIL 100 MG/1
TABLET, FILM COATED ORAL
Qty: 10 TABLET | Refills: 5 | Status: SHIPPED | OUTPATIENT
Start: 2022-11-22

## 2022-11-22 NOTE — TELEPHONE ENCOUNTER
----- Message from Wolf Negro sent at 11/21/2022  3:25 PM EST -----  Subject: Refill Request    QUESTIONS  Name of Medication? sildenafil (VIAGRA) 100 MG tablet  Patient-reported dosage and instructions? 10 mg tablet once daily as   needed   How many days do you have left? 1  Preferred Pharmacy? [id number] [facility]  Pharmacy phone number (if available)? [phone]  ---------------------------------------------------------------------------  --------------  Waylon JOHNSON  What is the best way for the office to contact you? OK to leave message on   voicemail, OK to respond with electronic message via [employer] portal (only   for patients who have registered [employer] account)  Preferred Call Back Phone Number? [phone]  ---------------------------------------------------------------------------  --------------  SCRIPT ANSWERS  Relationship to Patient?  Self

## 2022-11-22 NOTE — TELEPHONE ENCOUNTER
Medication:   Requested Prescriptions      No prescriptions requested or ordered in this encounter        Last Filled:      Patient Phone Number: [phone] (home) [phone] (work)    Last appt: 7/18/2022   Next appt: Visit date not found    Last OARRS: No flowsheet data found.

## 2023-01-23 LAB — DIABETIC RETINOPATHY: NEGATIVE

## 2023-01-26 DIAGNOSIS — R21 RASH AND NONSPECIFIC SKIN ERUPTION: ICD-10-CM

## 2023-01-26 DIAGNOSIS — N52.9 ERECTILE DYSFUNCTION, UNSPECIFIED ERECTILE DYSFUNCTION TYPE: ICD-10-CM

## 2023-01-27 RX ORDER — SILDENAFIL 100 MG/1
TABLET, FILM COATED ORAL
Qty: 10 TABLET | Refills: 0 | Status: SHIPPED | OUTPATIENT
Start: 2023-01-27

## 2023-01-27 RX ORDER — HYDROXYZINE HYDROCHLORIDE 25 MG/1
25 TABLET, FILM COATED ORAL EVERY 8 HOURS PRN
Qty: 60 TABLET | Refills: 0 | Status: SHIPPED | OUTPATIENT
Start: 2023-01-27 | End: 2023-02-16

## 2023-01-27 NOTE — TELEPHONE ENCOUNTER
Medication:   Requested Prescriptions     Pending Prescriptions Disp Refills    sildenafil (VIAGRA) 100 MG tablet [Pharmacy Med Name: Sildenafil Citrate 100 MG Oral Tablet] 10 tablet 0     Sig: TAKE 1 TABLET BY MOUTH ONCE DAILY AS NEEDED FOR ERECTILE DYSFUNCTION        Last Filled:      Patient Phone Number: [phone] (home) [phone] (work)    Last appt: 7/18/2022   Next appt: Visit date not found    Last OARRS: No flowsheet data found.

## 2023-01-31 ENCOUNTER — TELEMEDICINE (OUTPATIENT)
Dept: PRIMARY CARE CLINIC | Age: 53
End: 2023-01-31
Payer: COMMERCIAL

## 2023-01-31 DIAGNOSIS — N52.9 ERECTILE DYSFUNCTION, UNSPECIFIED ERECTILE DYSFUNCTION TYPE: ICD-10-CM

## 2023-01-31 PROCEDURE — 99213 OFFICE O/P EST LOW 20 MIN: CPT | Performed by: INTERNAL MEDICINE

## 2023-01-31 RX ORDER — SILDENAFIL 100 MG/1
TABLET, FILM COATED ORAL
Qty: 10 TABLET | Refills: 4 | Status: SHIPPED | OUTPATIENT
Start: 2023-01-31

## 2023-01-31 ASSESSMENT — ENCOUNTER SYMPTOMS
RESPIRATORY NEGATIVE: 1
EYES NEGATIVE: 1
EYE DISCHARGE: 0

## 2023-01-31 ASSESSMENT — PATIENT HEALTH QUESTIONNAIRE - PHQ9
1. LITTLE INTEREST OR PLEASURE IN DOING THINGS: 0
SUM OF ALL RESPONSES TO PHQ QUESTIONS 1-9: 0
2. FEELING DOWN, DEPRESSED OR HOPELESS: 0
SUM OF ALL RESPONSES TO PHQ9 QUESTIONS 1 & 2: 0
SUM OF ALL RESPONSES TO PHQ QUESTIONS 1-9: 0

## 2023-01-31 NOTE — PROGRESS NOTES
Bhavani Portillo (:  1970) is a Established patient, here for evaluation of the following:    Assessment & Plan   Below is the assessment and plan developed based on review of pertinent history, physical exam, labs, studies, and medications. 1. Erectile dysfunction, unspecified erectile dysfunction type  -     sildenafil (VIAGRA) 100 MG tablet; TAKE 1 TABLET BY MOUTH ONCE DAILY AS NEEDED FOR ERECTILE DYSFUNCTION, Disp-10 tablet, R-4Normal  The patient requested a refill of sildenafil. No follow-ups on file. Subjective   Erectile Dysfunction  This is a chronic problem. The current episode started more than 1 year ago. The problem is unchanged. He reports no anxiety. The treatment provided significant relief. He has had no adverse reactions caused by medications. Review of Systems   Constitutional:  Negative for activity change and appetite change. HENT: Negative. Eyes: Negative. Negative for discharge. Respiratory: Negative. Genitourinary:  Negative for difficulty urinating. Musculoskeletal:  Negative for arthralgias. Skin:  Negative for rash. Neurological: Negative. Psychiatric/Behavioral: Negative. Negative for agitation and confusion. The patient is not nervous/anxious. All other systems reviewed and are negative. Objective   Patient-Reported Vitals  No data recorded     Physical Exam  Constitutional:       Appearance: Normal appearance. He is well-developed. HENT:      Head: Normocephalic and atraumatic. Nose: Nose normal.      Mouth/Throat:      Mouth: Mucous membranes are moist.      Pharynx: Oropharynx is clear. Eyes:      Conjunctiva/sclera: Conjunctivae normal.      Pupils: Pupils are equal, round, and reactive to light. Cardiovascular:      Rate and Rhythm: Normal rate and regular rhythm. Heart sounds: Normal heart sounds. Pulmonary:      Effort: Pulmonary effort is normal.      Breath sounds: Normal breath sounds.    Abdominal:      General: Abdomen is flat. Palpations: Abdomen is soft. Musculoskeletal:         General: Normal range of motion. Cervical back: Normal range of motion and neck supple. Skin:     General: Skin is warm and dry. Neurological:      Mental Status: He is alert and oriented to person, place, and time. Psychiatric:         Behavior: Behavior normal.         Thought Content: Thought content normal.                Bridget Perez, was evaluated through a synchronous (real-time) audio-video encounter. The patient (or guardian if applicable) is aware that this is a billable service, which includes applicable co-pays. This Virtual Visit was conducted with patient's (and/or legal guardian's) consent. The visit was conducted pursuant to the emergency declaration under the [facility], [address] [place] [address] authority and the [employer] and [employer] General Act. Patient identification was verified, and a caregiver was present when appropriate. The patient was located at Home: [address] [place] [address].    Provider was located at [facility] ([address] [place], [place] [address]t): [address],  [address] [facility].        --Brett Freeman MD

## 2023-04-20 ENCOUNTER — TELEPHONE (OUTPATIENT)
Dept: PRIMARY CARE CLINIC | Age: 53
End: 2023-04-20

## 2023-04-20 NOTE — TELEPHONE ENCOUNTER
Patient says he needs a refill of Viagra sent to [facility] on [address]. He requests the brand name and not the generic. Please advise.

## 2023-04-20 NOTE — TELEPHONE ENCOUNTER
Medication:   Requested Prescriptions      No prescriptions requested or ordered in this encounter        Last Filled:      Patient Phone Number: [phone] (home) [phone] (work)    Last appt: 1/31/2023   Next appt: Visit date not found    Last OARRS: No flowsheet data found.

## 2023-04-21 DIAGNOSIS — N52.9 ERECTILE DYSFUNCTION, UNSPECIFIED ERECTILE DYSFUNCTION TYPE: ICD-10-CM

## 2023-04-21 RX ORDER — SILDENAFIL CITRATE 100 MG
100 TABLET ORAL PRN
Qty: 10 TABLET | Refills: 4 | Status: SHIPPED | OUTPATIENT
Start: 2023-04-21

## 2023-05-23 DIAGNOSIS — E66.01 MORBID OBESITY DUE TO EXCESS CALORIES (HCC): ICD-10-CM

## 2023-05-23 DIAGNOSIS — E11.65 POORLY CONTROLLED TYPE 2 DIABETES MELLITUS WITH NEUROPATHY (HCC): ICD-10-CM

## 2023-05-23 DIAGNOSIS — I10 ESSENTIAL HYPERTENSION: ICD-10-CM

## 2023-05-23 DIAGNOSIS — E11.9 TYPE 2 DIABETES MELLITUS WITHOUT COMPLICATION, WITHOUT LONG-TERM CURRENT USE OF INSULIN (HCC): ICD-10-CM

## 2023-05-23 DIAGNOSIS — E11.40 POORLY CONTROLLED TYPE 2 DIABETES MELLITUS WITH NEUROPATHY (HCC): ICD-10-CM

## 2023-05-23 DIAGNOSIS — E78.5 DYSLIPIDEMIA ASSOCIATED WITH TYPE 2 DIABETES MELLITUS (HCC): ICD-10-CM

## 2023-05-23 DIAGNOSIS — E11.69 DYSLIPIDEMIA ASSOCIATED WITH TYPE 2 DIABETES MELLITUS (HCC): ICD-10-CM

## 2023-05-23 RX ORDER — INSULIN GLARGINE 300 U/ML
INJECTION, SOLUTION SUBCUTANEOUS
Qty: 6 ML | Refills: 0 | OUTPATIENT
Start: 2023-05-23

## 2023-05-25 DIAGNOSIS — E11.65 POORLY CONTROLLED TYPE 2 DIABETES MELLITUS WITH NEUROPATHY (HCC): ICD-10-CM

## 2023-05-25 DIAGNOSIS — E66.01 MORBID OBESITY DUE TO EXCESS CALORIES (HCC): ICD-10-CM

## 2023-05-25 DIAGNOSIS — E11.9 TYPE 2 DIABETES MELLITUS WITHOUT COMPLICATION, WITHOUT LONG-TERM CURRENT USE OF INSULIN (HCC): ICD-10-CM

## 2023-05-25 DIAGNOSIS — E11.69 DYSLIPIDEMIA ASSOCIATED WITH TYPE 2 DIABETES MELLITUS (HCC): ICD-10-CM

## 2023-05-25 DIAGNOSIS — E78.5 DYSLIPIDEMIA ASSOCIATED WITH TYPE 2 DIABETES MELLITUS (HCC): ICD-10-CM

## 2023-05-25 DIAGNOSIS — E11.40 POORLY CONTROLLED TYPE 2 DIABETES MELLITUS WITH NEUROPATHY (HCC): ICD-10-CM

## 2023-05-25 DIAGNOSIS — I10 ESSENTIAL HYPERTENSION: ICD-10-CM

## 2023-05-25 RX ORDER — INSULIN GLARGINE 300 U/ML
INJECTION, SOLUTION SUBCUTANEOUS
Qty: 6 ML | Refills: 0 | OUTPATIENT
Start: 2023-05-25

## 2023-05-26 DIAGNOSIS — E11.69 DYSLIPIDEMIA ASSOCIATED WITH TYPE 2 DIABETES MELLITUS (HCC): ICD-10-CM

## 2023-05-26 DIAGNOSIS — E11.40 POORLY CONTROLLED TYPE 2 DIABETES MELLITUS WITH NEUROPATHY (HCC): ICD-10-CM

## 2023-05-26 DIAGNOSIS — E66.01 MORBID OBESITY DUE TO EXCESS CALORIES (HCC): ICD-10-CM

## 2023-05-26 DIAGNOSIS — E78.5 DYSLIPIDEMIA ASSOCIATED WITH TYPE 2 DIABETES MELLITUS (HCC): ICD-10-CM

## 2023-05-26 DIAGNOSIS — E11.65 POORLY CONTROLLED TYPE 2 DIABETES MELLITUS WITH NEUROPATHY (HCC): ICD-10-CM

## 2023-05-26 DIAGNOSIS — I10 ESSENTIAL HYPERTENSION: ICD-10-CM

## 2023-05-26 DIAGNOSIS — E11.9 TYPE 2 DIABETES MELLITUS WITHOUT COMPLICATION, WITHOUT LONG-TERM CURRENT USE OF INSULIN (HCC): ICD-10-CM

## 2023-05-26 RX ORDER — INSULIN GLARGINE 300 U/ML
INJECTION, SOLUTION SUBCUTANEOUS
Qty: 3 ADJUSTABLE DOSE PRE-FILLED PEN SYRINGE | Refills: 0 | Status: SHIPPED | OUTPATIENT
Start: 2023-05-26

## 2023-05-26 NOTE — TELEPHONE ENCOUNTER
Patient has been out of insulin for 5 days. He is requesting an insulin pen be sent into the pharmacy to get him to his appointment next week.

## 2023-06-06 ENCOUNTER — TELEPHONE (OUTPATIENT)
Dept: PRIMARY CARE CLINIC | Age: 53
End: 2023-06-06

## 2023-06-08 ENCOUNTER — OFFICE VISIT (OUTPATIENT)
Dept: PRIMARY CARE CLINIC | Age: 53
End: 2023-06-08
Payer: COMMERCIAL

## 2023-06-08 VITALS
TEMPERATURE: 98.7 F | OXYGEN SATURATION: 96 % | WEIGHT: 218 LBS | HEART RATE: 100 BPM | BODY MASS INDEX: 30.4 KG/M2 | SYSTOLIC BLOOD PRESSURE: 132 MMHG | DIASTOLIC BLOOD PRESSURE: 84 MMHG

## 2023-06-08 DIAGNOSIS — E11.9 TYPE 2 DIABETES MELLITUS WITHOUT COMPLICATION, WITHOUT LONG-TERM CURRENT USE OF INSULIN (HCC): Primary | ICD-10-CM

## 2023-06-08 DIAGNOSIS — D57.3 SICKLE CELL TRAIT (HCC): ICD-10-CM

## 2023-06-08 DIAGNOSIS — E11.69 DYSLIPIDEMIA ASSOCIATED WITH TYPE 2 DIABETES MELLITUS (HCC): ICD-10-CM

## 2023-06-08 DIAGNOSIS — E78.5 DYSLIPIDEMIA ASSOCIATED WITH TYPE 2 DIABETES MELLITUS (HCC): ICD-10-CM

## 2023-06-08 LAB — HBA1C MFR BLD: 10.8 %

## 2023-06-08 PROCEDURE — 3075F SYST BP GE 130 - 139MM HG: CPT | Performed by: INTERNAL MEDICINE

## 2023-06-08 PROCEDURE — 99213 OFFICE O/P EST LOW 20 MIN: CPT | Performed by: INTERNAL MEDICINE

## 2023-06-08 PROCEDURE — 3079F DIAST BP 80-89 MM HG: CPT | Performed by: INTERNAL MEDICINE

## 2023-06-08 PROCEDURE — 3046F HEMOGLOBIN A1C LEVEL >9.0%: CPT | Performed by: INTERNAL MEDICINE

## 2023-06-08 PROCEDURE — 83036 HEMOGLOBIN GLYCOSYLATED A1C: CPT | Performed by: INTERNAL MEDICINE

## 2023-06-08 SDOH — ECONOMIC STABILITY: INCOME INSECURITY: HOW HARD IS IT FOR YOU TO PAY FOR THE VERY BASICS LIKE FOOD, HOUSING, MEDICAL CARE, AND HEATING?: NOT HARD AT ALL

## 2023-06-08 SDOH — ECONOMIC STABILITY: FOOD INSECURITY: WITHIN THE PAST 12 MONTHS, THE FOOD YOU BOUGHT JUST DIDN'T LAST AND YOU DIDN'T HAVE MONEY TO GET MORE.: NEVER TRUE

## 2023-06-08 SDOH — ECONOMIC STABILITY: FOOD INSECURITY: WITHIN THE PAST 12 MONTHS, YOU WORRIED THAT YOUR FOOD WOULD RUN OUT BEFORE YOU GOT MONEY TO BUY MORE.: NEVER TRUE

## 2023-06-08 SDOH — ECONOMIC STABILITY: HOUSING INSECURITY
IN THE LAST 12 MONTHS, WAS THERE A TIME WHEN YOU DID NOT HAVE A STEADY PLACE TO SLEEP OR SLEPT IN A SHELTER (INCLUDING NOW)?: NO

## 2023-06-08 ASSESSMENT — ENCOUNTER SYMPTOMS
EYE ITCHING: 0
EYE REDNESS: 0
GASTROINTESTINAL NEGATIVE: 1
EYE PAIN: 0
PHOTOPHOBIA: 0
RESPIRATORY NEGATIVE: 1
EYE DISCHARGE: 0

## 2023-06-08 ASSESSMENT — PATIENT HEALTH QUESTIONNAIRE - PHQ9
SUM OF ALL RESPONSES TO PHQ QUESTIONS 1-9: 0
1. LITTLE INTEREST OR PLEASURE IN DOING THINGS: 0
SUM OF ALL RESPONSES TO PHQ QUESTIONS 1-9: 0
SUM OF ALL RESPONSES TO PHQ9 QUESTIONS 1 & 2: 0
SUM OF ALL RESPONSES TO PHQ QUESTIONS 1-9: 0
2. FEELING DOWN, DEPRESSED OR HOPELESS: 0
SUM OF ALL RESPONSES TO PHQ QUESTIONS 1-9: 0

## 2023-06-08 NOTE — PROGRESS NOTES
Pharynx: Oropharynx is clear. Eyes:      Conjunctiva/sclera: Conjunctivae normal.      Pupils: Pupils are equal, round, and reactive to light. Cardiovascular:      Rate and Rhythm: Normal rate and regular rhythm. Heart sounds: Normal heart sounds. Pulmonary:      Effort: Pulmonary effort is normal.      Breath sounds: Normal breath sounds. Abdominal:      General: Abdomen is flat. Palpations: Abdomen is soft. Musculoskeletal:         General: Normal range of motion. Cervical back: Normal range of motion and neck supple. Skin:     General: Skin is warm and dry. Neurological:      Mental Status: He is alert and oriented to person, place, and time. Psychiatric:         Behavior: Behavior normal.         Thought Content: Thought content normal.          Dyslipidemia associated with type 2 diabetes mellitus ([facility] [address]   Borderline controlled, continue current medications       On this date 6/8/2023 I have spent 30 minutes reviewing previous notes, test results and face to face with the patient discussing the diagnosis and importance of compliance with the treatment plan as well as documenting on the day of the visit. An electronic signature was used to authenticate this note.     --Ruth Maldonado MD

## 2023-07-05 DIAGNOSIS — E11.9 TYPE 2 DIABETES MELLITUS WITHOUT COMPLICATION, WITHOUT LONG-TERM CURRENT USE OF INSULIN (HCC): ICD-10-CM

## 2023-07-05 DIAGNOSIS — E11.40 POORLY CONTROLLED TYPE 2 DIABETES MELLITUS WITH NEUROPATHY (HCC): ICD-10-CM

## 2023-07-05 DIAGNOSIS — E78.5 DYSLIPIDEMIA ASSOCIATED WITH TYPE 2 DIABETES MELLITUS (HCC): ICD-10-CM

## 2023-07-05 DIAGNOSIS — I10 ESSENTIAL HYPERTENSION: ICD-10-CM

## 2023-07-05 DIAGNOSIS — E11.69 DYSLIPIDEMIA ASSOCIATED WITH TYPE 2 DIABETES MELLITUS (HCC): ICD-10-CM

## 2023-07-05 DIAGNOSIS — E11.65 POORLY CONTROLLED TYPE 2 DIABETES MELLITUS WITH NEUROPATHY (HCC): ICD-10-CM

## 2023-07-05 DIAGNOSIS — E66.01 MORBID OBESITY DUE TO EXCESS CALORIES (HCC): ICD-10-CM

## 2023-07-05 RX ORDER — INSULIN GLARGINE 300 U/ML
INJECTION, SOLUTION SUBCUTANEOUS
Qty: 6 ML | Refills: 0 | Status: SHIPPED | OUTPATIENT
Start: 2023-07-05

## 2023-07-05 NOTE — TELEPHONE ENCOUNTER
Requested Refill:   Requested Prescriptions     Pending Prescriptions Disp Refills    TOUJEO SOLOSTAR 300 UNIT/ML concentrated injection pen [Pharmacy Med Name: Toujeo SoloStar 300 UNIT/ML Subcutaneous Solution Pen-injector] 6 mL 0     Sig: INJECT 35 UNITS SUBCUTANEOUSLY IN THE MORNING       Last refilled: 5/26/2023 # 3 pens with no refills     Last Appt: 11/21/2022   Next Appt: 7/17/2023    Multiple cancellations.

## 2023-08-07 DIAGNOSIS — E11.65 POORLY CONTROLLED TYPE 2 DIABETES MELLITUS WITH NEUROPATHY (HCC): ICD-10-CM

## 2023-08-07 DIAGNOSIS — E11.9 TYPE 2 DIABETES MELLITUS WITHOUT COMPLICATION, WITHOUT LONG-TERM CURRENT USE OF INSULIN (HCC): ICD-10-CM

## 2023-08-07 DIAGNOSIS — E11.40 POORLY CONTROLLED TYPE 2 DIABETES MELLITUS WITH NEUROPATHY (HCC): ICD-10-CM

## 2023-08-07 DIAGNOSIS — E66.01 MORBID OBESITY DUE TO EXCESS CALORIES (HCC): ICD-10-CM

## 2023-08-07 DIAGNOSIS — E11.69 DYSLIPIDEMIA ASSOCIATED WITH TYPE 2 DIABETES MELLITUS (HCC): ICD-10-CM

## 2023-08-07 DIAGNOSIS — E78.5 DYSLIPIDEMIA ASSOCIATED WITH TYPE 2 DIABETES MELLITUS (HCC): ICD-10-CM

## 2023-08-07 DIAGNOSIS — I10 ESSENTIAL HYPERTENSION: ICD-10-CM

## 2023-08-07 RX ORDER — INSULIN GLARGINE 300 U/ML
INJECTION, SOLUTION SUBCUTANEOUS
Qty: 6 ML | Refills: 0 | OUTPATIENT
Start: 2023-08-07

## 2023-08-07 NOTE — TELEPHONE ENCOUNTER
Requested Refill:   Requested Prescriptions     Pending Prescriptions Disp Refills    TOUJEO SOLOSTAR 300 UNIT/ML concentrated injection pen [Pharmacy Med Name: Toujeo SoloStar 300 UNIT/ML Subcutaneous Solution Pen-injector] 6 mL 0     Sig: INJECT 35 UNITS SUBCUTANEOUSLY IN THE MORNING       Last refilled:  7/5/2023 # 6 ml no refills     Last Appt: 11/21/2022  Next Appt: has cancelled last 6 appointments after receiving refills.   Follow up: 9/25/2023

## 2023-08-08 RX ORDER — INSULIN GLARGINE 300 U/ML
INJECTION, SOLUTION SUBCUTANEOUS
Qty: 6 ML | Refills: 0 | Status: SHIPPED | OUTPATIENT
Start: 2023-08-08

## 2023-08-08 NOTE — TELEPHONE ENCOUNTER
Requested Refill:   Requested Prescriptions     Pending Prescriptions Disp Refills    metFORMIN (GLUCOPHAGE) 1000 MG tablet [Pharmacy Med Name: metFORMIN HCl 1000 MG Oral Tablet] 180 tablet 0     Sig: Take 1 tablet by mouth twice daily with food       Last refilled:  11/21/2022 # 180 tablets with 1 refill     Last Appt: 11/21/2022  Next Appt: sooner appointment needed for refill- multiple cancellations.

## 2023-08-08 NOTE — TELEPHONE ENCOUNTER
Call from pt wanting to know why Dr. Ashley Moran denied his refills     I gave pt messages verbatim regarding why his prescriptions were denied  I informed pt that Dr. Ashley Moran wanted pt to be seen sooner       I offered both locations and informed pt that we had a sooner appt in [facility] but nothing sooner in [facility]     Pt stated that he is having car trouble and is unable to travel to [employer] stated that he has seen his PCP a couple months ago      Pt stated that he is completely out of his Metformin and only has 4 days left of his Toujeo     Pt is requesting to speak with Dr. Ashley Moran or April     Please advise   CB# [phone]

## 2023-08-18 DIAGNOSIS — E66.01 MORBID OBESITY DUE TO EXCESS CALORIES (HCC): ICD-10-CM

## 2023-08-18 DIAGNOSIS — E11.40 POORLY CONTROLLED TYPE 2 DIABETES MELLITUS WITH NEUROPATHY (HCC): ICD-10-CM

## 2023-08-18 DIAGNOSIS — E11.69 DYSLIPIDEMIA ASSOCIATED WITH TYPE 2 DIABETES MELLITUS (HCC): ICD-10-CM

## 2023-08-18 DIAGNOSIS — I10 ESSENTIAL HYPERTENSION: ICD-10-CM

## 2023-08-18 DIAGNOSIS — E11.9 TYPE 2 DIABETES MELLITUS WITHOUT COMPLICATION, WITHOUT LONG-TERM CURRENT USE OF INSULIN (HCC): ICD-10-CM

## 2023-08-18 DIAGNOSIS — E11.65 POORLY CONTROLLED TYPE 2 DIABETES MELLITUS WITH NEUROPATHY (HCC): ICD-10-CM

## 2023-08-18 DIAGNOSIS — E78.5 DYSLIPIDEMIA ASSOCIATED WITH TYPE 2 DIABETES MELLITUS (HCC): ICD-10-CM

## 2023-08-20 LAB — FRUCTOSAMINE SERPL-SCNC: 358 UMOL/L (ref 205–285)

## 2023-08-21 ENCOUNTER — OFFICE VISIT (OUTPATIENT)
Dept: ENDOCRINOLOGY | Age: 53
End: 2023-08-21
Payer: COMMERCIAL

## 2023-08-21 VITALS
OXYGEN SATURATION: 97 % | HEART RATE: 79 BPM | HEIGHT: 71 IN | WEIGHT: 221.8 LBS | BODY MASS INDEX: 31.05 KG/M2 | SYSTOLIC BLOOD PRESSURE: 120 MMHG | DIASTOLIC BLOOD PRESSURE: 82 MMHG

## 2023-08-21 DIAGNOSIS — E11.69 DYSLIPIDEMIA ASSOCIATED WITH TYPE 2 DIABETES MELLITUS (HCC): ICD-10-CM

## 2023-08-21 DIAGNOSIS — E66.01 MORBID OBESITY DUE TO EXCESS CALORIES (HCC): ICD-10-CM

## 2023-08-21 DIAGNOSIS — E11.9 TYPE 2 DIABETES MELLITUS WITHOUT COMPLICATION, WITHOUT LONG-TERM CURRENT USE OF INSULIN (HCC): ICD-10-CM

## 2023-08-21 DIAGNOSIS — E78.5 DYSLIPIDEMIA ASSOCIATED WITH TYPE 2 DIABETES MELLITUS (HCC): ICD-10-CM

## 2023-08-21 DIAGNOSIS — E11.65 POORLY CONTROLLED TYPE 2 DIABETES MELLITUS WITH NEUROPATHY (HCC): Primary | ICD-10-CM

## 2023-08-21 DIAGNOSIS — E11.40 POORLY CONTROLLED TYPE 2 DIABETES MELLITUS WITH NEUROPATHY (HCC): Primary | ICD-10-CM

## 2023-08-21 DIAGNOSIS — I10 ESSENTIAL HYPERTENSION: ICD-10-CM

## 2023-08-21 PROCEDURE — 99214 OFFICE O/P EST MOD 30 MIN: CPT | Performed by: INTERNAL MEDICINE

## 2023-08-21 PROCEDURE — 3074F SYST BP LT 130 MM HG: CPT | Performed by: INTERNAL MEDICINE

## 2023-08-21 PROCEDURE — 3079F DIAST BP 80-89 MM HG: CPT | Performed by: INTERNAL MEDICINE

## 2023-08-21 PROCEDURE — 3046F HEMOGLOBIN A1C LEVEL >9.0%: CPT | Performed by: INTERNAL MEDICINE

## 2023-08-21 RX ORDER — INSULIN GLARGINE 300 U/ML
INJECTION, SOLUTION SUBCUTANEOUS
Qty: 9 ML | Refills: 2 | Status: SHIPPED | OUTPATIENT
Start: 2023-08-21

## 2023-08-21 NOTE — PROGRESS NOTES
medication compliance and regular follow-up. Aggressive lifestyle modification was encouraged. Exercise Counselling: This patient is a candidate for regular physical exercise. Instructions to perform the following types of exercise:  Swimming or water aerobic exercise  Brisk walking  Playing tennis  Stationary bicycle or elliptical indoor  Low impact aerobic exercise    Instructions given to exercise for the following duration:  30 minutes a day for five-seven days per week.     Following instructions for being active throughout the day in addition to formal exercise:  Walk instead of drive whenever possible  Take the stairs instead of the elevator  Work in the garden  Park to the far end of the parking lot to add more walking steps to destination      Electronically signed by Mady Mittal MD on 8/21/2023 at 3:51 PM

## 2023-09-26 DIAGNOSIS — I10 ESSENTIAL HYPERTENSION: ICD-10-CM

## 2023-09-26 DIAGNOSIS — E11.40 POORLY CONTROLLED TYPE 2 DIABETES MELLITUS WITH NEUROPATHY (HCC): ICD-10-CM

## 2023-09-26 DIAGNOSIS — E11.69 DYSLIPIDEMIA ASSOCIATED WITH TYPE 2 DIABETES MELLITUS (HCC): ICD-10-CM

## 2023-09-26 DIAGNOSIS — E66.01 MORBID OBESITY DUE TO EXCESS CALORIES (HCC): ICD-10-CM

## 2023-09-26 DIAGNOSIS — E78.5 DYSLIPIDEMIA ASSOCIATED WITH TYPE 2 DIABETES MELLITUS (HCC): ICD-10-CM

## 2023-09-26 DIAGNOSIS — E11.9 TYPE 2 DIABETES MELLITUS WITHOUT COMPLICATION, WITHOUT LONG-TERM CURRENT USE OF INSULIN (HCC): ICD-10-CM

## 2023-09-26 DIAGNOSIS — E11.65 POORLY CONTROLLED TYPE 2 DIABETES MELLITUS WITH NEUROPATHY (HCC): ICD-10-CM

## 2023-09-26 RX ORDER — ATORVASTATIN CALCIUM 40 MG/1
TABLET, FILM COATED ORAL
Qty: 90 TABLET | Refills: 0 | Status: SHIPPED | OUTPATIENT
Start: 2023-09-26

## 2023-09-26 NOTE — TELEPHONE ENCOUNTER
NURSE NOTES:

Repositioned patient. 30 ml NGT flushed. VSS. HR NSR. Afebrie. No acute distress. Requested Refill:   Requested Prescriptions     Pending Prescriptions Disp Refills    atorvastatin (LIPITOR) 40 MG tablet [Pharmacy Med Name: Atorvastatin Calcium 40 MG Oral Tablet] 90 tablet 0     Sig: Take 1 tablet by mouth once daily       Last refilled: 11/21/2022 # 90 with 1 refill     Last Appt: 8/21/2023  Next Appt: 11/27/2023

## 2023-10-12 ENCOUNTER — TELEPHONE (OUTPATIENT)
Dept: PRIMARY CARE CLINIC | Age: 53
End: 2023-10-12

## 2023-10-12 RX ORDER — SILDENAFIL CITRATE 100 MG
100 TABLET ORAL PRN
Qty: 10 TABLET | Refills: 3 | Status: SHIPPED | OUTPATIENT
Start: 2023-10-12

## 2023-10-12 NOTE — TELEPHONE ENCOUNTER
----- Message from Anat Sexton sent at 10/12/2023 10:14 AM EDT -----  Subject: Refill Request    QUESTIONS  Name of Medication? hydrOXYzine HCl (ATARAX) 25 MG tablet  Patient-reported dosage and instructions? unknown  How many days do you have left? 0  Preferred Pharmacy? [id number]  Pharmacy phone number (if available)? [phone]  ---------------------------------------------------------------------------  --------------  Everrett Leaks INFO  What is the best way for the office to contact you? OK to leave message on   voicemail  Preferred Call Back Phone Number? [phone]  ---------------------------------------------------------------------------  --------------  SCRIPT ANSWERS  Relationship to Patient?  Self

## 2023-10-12 NOTE — TELEPHONE ENCOUNTER
----- Message from Maya Fermin sent at 10/12/2023 10:11 AM EDT -----  Subject: Message to Provider    QUESTIONS  Information for Provider? The patient called on 10/12/2023. He stated [facility] will be faxing a letter to the office that will need completed by   the pcp for [employer] assistance. please contact the patient when   this letter has been received and faxed back to Angi. The patient   stated the office should also have [place] paperwork from [facility]. please have the pcp fill these papers out and contact the patient to pick   these up when completed. ---------------------------------------------------------------------------  --------------  Kim JULIAN  [phone]; OK to leave message on voicemail  ---------------------------------------------------------------------------  --------------  SCRIPT ANSWERS  Relationship to Patient?  Self

## 2023-10-13 RX ORDER — HYDROXYZINE HYDROCHLORIDE 25 MG/1
25 TABLET, FILM COATED ORAL NIGHTLY
Qty: 30 TABLET | Refills: 0 | Status: SHIPPED | OUTPATIENT
Start: 2023-10-13 | End: 2023-11-12

## 2023-10-17 ENCOUNTER — OFFICE VISIT (OUTPATIENT)
Dept: PRIMARY CARE CLINIC | Age: 53
End: 2023-10-17
Payer: COMMERCIAL

## 2023-10-17 VITALS
TEMPERATURE: 97.7 F | DIASTOLIC BLOOD PRESSURE: 86 MMHG | WEIGHT: 226 LBS | SYSTOLIC BLOOD PRESSURE: 134 MMHG | HEART RATE: 88 BPM | BODY MASS INDEX: 31.52 KG/M2

## 2023-10-17 DIAGNOSIS — E11.9 TYPE 2 DIABETES MELLITUS WITHOUT COMPLICATION, WITHOUT LONG-TERM CURRENT USE OF INSULIN (HCC): Primary | Chronic | ICD-10-CM

## 2023-10-17 PROBLEM — E11.65 TYPE 2 DIABETES MELLITUS WITH HYPERGLYCEMIA (HCC): Status: ACTIVE | Noted: 2023-10-17

## 2023-10-17 PROCEDURE — 99213 OFFICE O/P EST LOW 20 MIN: CPT | Performed by: INTERNAL MEDICINE

## 2023-10-17 PROCEDURE — 3046F HEMOGLOBIN A1C LEVEL >9.0%: CPT | Performed by: INTERNAL MEDICINE

## 2023-10-17 PROCEDURE — 3078F DIAST BP <80 MM HG: CPT | Performed by: INTERNAL MEDICINE

## 2023-10-17 PROCEDURE — 3074F SYST BP LT 130 MM HG: CPT | Performed by: INTERNAL MEDICINE

## 2023-10-18 NOTE — PROGRESS NOTES
Karen Gillespie (:  1970) is a 46 y.o. male,Established patient, here for evaluation of the following chief complaint(s):  Diabetes         ASSESSMENT/PLAN:  1. Type 2 diabetes mellitus without complication, without long-term current use of insulin (HCC)  -     POCT glycosylated hemoglobin (Hb A1C)  -     Microalbumin / Creatinine Urine Ratio; Future  2. Dyslipidemia associated with type 2 diabetes mellitus ([address])  Assessment & Plan:   Borderline controlled, continue current medications    3. Sickle cell trait (HCC)               Subjective   SUBJECTIVE/OBJECTIVE:  Diabetes  He presents for his follow-up diabetic visit. He has type 2 diabetes mellitus. His disease course has been stable. There are no hypoglycemic associated symptoms. There are no diabetic associated symptoms. Pertinent negatives for diabetes include no fatigue. Symptoms are stable. Hyperlipidemia  This is a chronic problem. The current episode started more than 1 year ago. The problem is uncontrolled. Recent lipid tests were reviewed and are high. There are no known factors aggravating his hyperlipidemia. The current treatment provides mild improvement of lipids. Review of Systems   Constitutional:  Negative for activity change, appetite change, chills, diaphoresis, fatigue, fever and unexpected weight change. HENT: Negative. Eyes:  Negative for photophobia, pain, discharge, redness, itching and visual disturbance. Respiratory: Negative. Cardiovascular: Negative. Gastrointestinal: Negative. Genitourinary: Negative. Musculoskeletal: Negative. Skin: Negative. Neurological: Negative. Psychiatric/Behavioral: Negative. Objective   Physical Exam  Constitutional:       Appearance: Normal appearance. He is well-developed. HENT:      Head: Normocephalic and atraumatic. Nose: Nose normal.      Mouth/Throat:      Mouth: Mucous membranes are moist.      Pharynx: Oropharynx is clear.    Eyes:

## 2023-10-31 RX ORDER — SILDENAFIL 100 MG/1
TABLET, FILM COATED ORAL
Qty: 10 TABLET | Refills: 0 | Status: SHIPPED | OUTPATIENT
Start: 2023-10-31

## 2023-10-31 NOTE — TELEPHONE ENCOUNTER
Medication:   Requested Prescriptions     Pending Prescriptions Disp Refills    sildenafil (VIAGRA) 100 MG tablet [Pharmacy Med Name: Sildenafil Citrate 100 MG Oral Tablet] 10 tablet 0     Sig: TAKE 1 TABLET BY MOUTH ONCE DAILY AS NEEDED FOR ERECTILE DYSFUNCTION        Last Filled:      Patient Phone Number: [phone] (home) [phone] (work)    Last appt: 10/17/2023   Next appt: Visit date not found    Last OARRS:        No data to display

## 2023-11-22 DIAGNOSIS — E78.5 DYSLIPIDEMIA ASSOCIATED WITH TYPE 2 DIABETES MELLITUS (HCC): ICD-10-CM

## 2023-11-22 DIAGNOSIS — E11.40 POORLY CONTROLLED TYPE 2 DIABETES MELLITUS WITH NEUROPATHY (HCC): ICD-10-CM

## 2023-11-22 DIAGNOSIS — E11.65 POORLY CONTROLLED TYPE 2 DIABETES MELLITUS WITH NEUROPATHY (HCC): ICD-10-CM

## 2023-11-22 DIAGNOSIS — E11.69 DYSLIPIDEMIA ASSOCIATED WITH TYPE 2 DIABETES MELLITUS (HCC): ICD-10-CM

## 2023-11-22 LAB
ALBUMIN SERPL-MCNC: 4.4 G/DL (ref 3.4–5)
ALBUMIN/GLOB SERPL: 1.8 {RATIO} (ref 1.1–2.2)
ALP SERPL-CCNC: 45 U/L (ref 40–129)
ALT SERPL-CCNC: 40 U/L (ref 10–40)
ANION GAP SERPL CALCULATED.3IONS-SCNC: 9 MMOL/L (ref 3–16)
AST SERPL-CCNC: 28 U/L (ref 15–37)
BILIRUB SERPL-MCNC: 0.4 MG/DL (ref 0–1)
BUN SERPL-MCNC: 8 MG/DL (ref 7–20)
CALCIUM SERPL-MCNC: 9.4 MG/DL (ref 8.3–10.6)
CHLORIDE SERPL-SCNC: 102 MMOL/L (ref 99–110)
CHOLEST SERPL-MCNC: 182 MG/DL (ref 0–199)
CO2 SERPL-SCNC: 27 MMOL/L (ref 21–32)
CREAT SERPL-MCNC: 0.8 MG/DL (ref 0.9–1.3)
CREAT UR-MCNC: 160.3 MG/DL (ref 39–259)
FOLATE SERPL-MCNC: 14.7 NG/ML (ref 4.78–24.2)
GFR SERPLBLD CREATININE-BSD FMLA CKD-EPI: >60 ML/MIN/{1.73_M2}
GLUCOSE SERPL-MCNC: 173 MG/DL (ref 70–99)
HDLC SERPL-MCNC: 40 MG/DL (ref 40–60)
LDL CHOLESTEROL CALCULATED: 112 MG/DL
MICROALBUMIN UR DL<=1MG/L-MCNC: 13.6 MG/DL
MICROALBUMIN/CREAT UR: 84.8 MG/G (ref 0–30)
POTASSIUM SERPL-SCNC: 4.7 MMOL/L (ref 3.5–5.1)
PROT SERPL-MCNC: 6.9 G/DL (ref 6.4–8.2)
SODIUM SERPL-SCNC: 138 MMOL/L (ref 136–145)
TRIGL SERPL-MCNC: 151 MG/DL (ref 0–150)
TSH SERPL DL<=0.005 MIU/L-ACNC: 3.36 UIU/ML (ref 0.27–4.2)
VIT B12 SERPL-MCNC: 285 PG/ML (ref 211–911)
VLDLC SERPL CALC-MCNC: 30 MG/DL

## 2023-11-24 LAB — FRUCTOSAMINE SERPL-SCNC: 362 UMOL/L (ref 205–285)

## 2023-11-27 ENCOUNTER — OFFICE VISIT (OUTPATIENT)
Dept: ENDOCRINOLOGY | Age: 53
End: 2023-11-27
Payer: COMMERCIAL

## 2023-11-27 VITALS
OXYGEN SATURATION: 97 % | HEIGHT: 71 IN | BODY MASS INDEX: 30.8 KG/M2 | HEART RATE: 84 BPM | WEIGHT: 220 LBS | SYSTOLIC BLOOD PRESSURE: 110 MMHG | DIASTOLIC BLOOD PRESSURE: 72 MMHG

## 2023-11-27 DIAGNOSIS — E66.01 MORBID OBESITY DUE TO EXCESS CALORIES (HCC): ICD-10-CM

## 2023-11-27 DIAGNOSIS — E11.65 POORLY CONTROLLED TYPE 2 DIABETES MELLITUS WITH NEUROPATHY (HCC): Primary | ICD-10-CM

## 2023-11-27 DIAGNOSIS — E78.5 DYSLIPIDEMIA ASSOCIATED WITH TYPE 2 DIABETES MELLITUS (HCC): ICD-10-CM

## 2023-11-27 DIAGNOSIS — E11.69 DYSLIPIDEMIA ASSOCIATED WITH TYPE 2 DIABETES MELLITUS (HCC): ICD-10-CM

## 2023-11-27 DIAGNOSIS — E53.8 B12 DEFICIENCY: ICD-10-CM

## 2023-11-27 DIAGNOSIS — E11.9 TYPE 2 DIABETES MELLITUS WITHOUT COMPLICATION, WITHOUT LONG-TERM CURRENT USE OF INSULIN (HCC): ICD-10-CM

## 2023-11-27 DIAGNOSIS — I10 ESSENTIAL HYPERTENSION: ICD-10-CM

## 2023-11-27 DIAGNOSIS — E11.40 POORLY CONTROLLED TYPE 2 DIABETES MELLITUS WITH NEUROPATHY (HCC): Primary | ICD-10-CM

## 2023-11-27 PROCEDURE — 3074F SYST BP LT 130 MM HG: CPT | Performed by: INTERNAL MEDICINE

## 2023-11-27 PROCEDURE — 3046F HEMOGLOBIN A1C LEVEL >9.0%: CPT | Performed by: INTERNAL MEDICINE

## 2023-11-27 PROCEDURE — 99214 OFFICE O/P EST MOD 30 MIN: CPT | Performed by: INTERNAL MEDICINE

## 2023-11-27 PROCEDURE — 3078F DIAST BP <80 MM HG: CPT | Performed by: INTERNAL MEDICINE

## 2023-11-27 RX ORDER — ATORVASTATIN CALCIUM 40 MG/1
40 TABLET, FILM COATED ORAL DAILY
Qty: 90 TABLET | Refills: 3 | Status: SHIPPED | OUTPATIENT
Start: 2023-11-27

## 2023-11-27 RX ORDER — INSULIN GLARGINE AND LIXISENATIDE 100; 33 U/ML; UG/ML
INJECTION, SOLUTION SUBCUTANEOUS
Qty: 5 ADJUSTABLE DOSE PRE-FILLED PEN SYRINGE | Refills: 2 | Status: SHIPPED | OUTPATIENT
Start: 2023-11-27

## 2023-11-27 RX ORDER — ATORVASTATIN CALCIUM 40 MG/1
40 TABLET, FILM COATED ORAL DAILY
Qty: 90 TABLET | Refills: 0 | Status: SHIPPED | OUTPATIENT
Start: 2023-11-27 | End: 2023-11-27 | Stop reason: SDUPTHER

## 2023-11-27 NOTE — PROGRESS NOTES
Patient ID:   Jolanta Salgado is a 46 y.o. male    Chief Complaint:   Jolanta Salgado presents for an evaluation of Type 2 Diabetes Mellitus , Hyperlipidemia and hypertension. Subjective:   Type 2 Diabetes Mellitus diagnosed in   On insulin since 9948   Trulicity caused severe GI side effects   Invokana caused urinary issues      Metformin 1000 mg bid. Not missing evening doses. Toujeo 38 units daily at 6am. No TTT. Eating erratic. A lot of poor choices as per him. Weight is up 2 lbs     Working on diet (mor evegetable, fruits, baking), exercise , cutting down alcohol (on weekends only now)     Active at second job, 4 hours from 5-9pm. Always walking. checking sugars , last time a month ago. Checks blood sugars 0-2 times per day. Reportedly    AM:      Lunch:    Supper:   HS:       Hypoglycemias: None     Meals Three dinner is bigger. Chips 2 bags per day, 90 osbaldo each. No sodas, some juice or regular tea. Exercise: Walking around plant, may be 2 miles per day. Bike around the neighborhood twice a week. 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      Family history of CAD: sister  of CAD at age 61   Denies smoking   On low dose Aspirin    The following portions of the patient's history were reviewed and updated as appropriate:       Family History   Problem Relation Age of Onset    Diabetes Mother     Diabetes Sister     Diabetes Brother     Diabetes Brother          Social History     Socioeconomic History    Marital status: Single     Spouse name: Not on file    Number of children: Not on file    Years of education: Not on file    Highest education level: Not on file   Occupational History    Not on file   Tobacco Use    Smoking status: Never    Smokeless tobacco: Never   Vaping Use    Vaping Use: Never used   Substance and Sexual Activity    Alcohol use: Yes     Comment: social    Drug use: No    Sexual activity:

## 2023-12-28 RX ORDER — SILDENAFIL 100 MG/1
TABLET, FILM COATED ORAL
Qty: 10 TABLET | Refills: 0 | Status: SHIPPED | OUTPATIENT
Start: 2023-12-28

## 2023-12-28 NOTE — TELEPHONE ENCOUNTER
Medication:   Requested Prescriptions     Pending Prescriptions Disp Refills    sildenafil (VIAGRA) 100 MG tablet [Pharmacy Med Name: Sildenafil Citrate 100 MG Oral Tablet] 10 tablet 0     Sig: TAKE 1 TABLET BY MOUTH ONCE DAILY AS NEEDED FOR ERECTILE DYSFUNCTION        Last Filled:      Patient Phone Number: [phone] (home) [phone] (work)    Last appt: 10/17/2023   Next appt: Visit date not found    Last OARRS:        No data to display

## 2024-01-15 RX ORDER — SILDENAFIL 100 MG/1
TABLET, FILM COATED ORAL
Qty: 10 TABLET | Refills: 0 | Status: SHIPPED | OUTPATIENT
Start: 2024-01-15

## 2024-01-15 NOTE — TELEPHONE ENCOUNTER
Medication:   Requested Prescriptions     Pending Prescriptions Disp Refills    sildenafil (VIAGRA) 100 MG tablet [Pharmacy Med Name: Sildenafil Citrate 100 MG Oral Tablet] 10 tablet 0     Sig: TAKE 1 TABLET BY MOUTH ONCE DAILY AS NEEDED FOR ERECTILE DYSFUNCTION        Last Filled:  [unfilled]    Patient Phone Number: [phone] (home) [phone] (work)    Last appt: 10/17/2023   Next appt: Visit date not found    Last OARRS:        No data to display

## 2024-01-19 ENCOUNTER — OFFICE VISIT (OUTPATIENT)
Dept: PRIMARY CARE CLINIC | Age: 54
End: 2024-01-19
Payer: COMMERCIAL

## 2024-01-19 VITALS
WEIGHT: 224.2 LBS | HEIGHT: 71 IN | BODY MASS INDEX: 31.39 KG/M2 | SYSTOLIC BLOOD PRESSURE: 120 MMHG | TEMPERATURE: 97.5 F | OXYGEN SATURATION: 96 % | HEART RATE: 95 BPM | DIASTOLIC BLOOD PRESSURE: 70 MMHG

## 2024-01-19 DIAGNOSIS — Z23 FLU VACCINE NEED: Primary | ICD-10-CM

## 2024-01-19 DIAGNOSIS — Z23 NEED FOR PNEUMOCOCCAL VACCINATION: ICD-10-CM

## 2024-01-19 DIAGNOSIS — Z23 NEED FOR TDAP VACCINATION: ICD-10-CM

## 2024-01-19 DIAGNOSIS — M77.9 TENDINITIS: ICD-10-CM

## 2024-01-19 DIAGNOSIS — E78.5 DYSLIPIDEMIA ASSOCIATED WITH TYPE 2 DIABETES MELLITUS (HCC): ICD-10-CM

## 2024-01-19 DIAGNOSIS — E11.69 DYSLIPIDEMIA ASSOCIATED WITH TYPE 2 DIABETES MELLITUS (HCC): ICD-10-CM

## 2024-01-19 DIAGNOSIS — D57.3 SICKLE CELL TRAIT (HCC): ICD-10-CM

## 2024-01-19 DIAGNOSIS — Z23 NEED FOR COVID-19 VACCINE: ICD-10-CM

## 2024-01-19 DIAGNOSIS — Z23 NEED FOR SHINGLES VACCINE: ICD-10-CM

## 2024-01-19 PROCEDURE — 99214 OFFICE O/P EST MOD 30 MIN: CPT | Performed by: INTERNAL MEDICINE

## 2024-01-19 PROCEDURE — 3074F SYST BP LT 130 MM HG: CPT | Performed by: INTERNAL MEDICINE

## 2024-01-19 PROCEDURE — 3078F DIAST BP <80 MM HG: CPT | Performed by: INTERNAL MEDICINE

## 2024-01-19 RX ORDER — MELOXICAM 15 MG/1
15 TABLET ORAL DAILY
Qty: 30 TABLET | Refills: 0 | Status: SHIPPED | OUTPATIENT
Start: 2024-01-19

## 2024-01-19 ASSESSMENT — PATIENT HEALTH QUESTIONNAIRE - PHQ9
2. FEELING DOWN, DEPRESSED OR HOPELESS: 0
SUM OF ALL RESPONSES TO PHQ QUESTIONS 1-9: 0
SUM OF ALL RESPONSES TO PHQ9 QUESTIONS 1 & 2: 0
SUM OF ALL RESPONSES TO PHQ QUESTIONS 1-9: 0
1. LITTLE INTEREST OR PLEASURE IN DOING THINGS: 0

## 2024-01-19 NOTE — PROGRESS NOTES
Subjective:      Patient ID: Seth Villavicencio is a 54 y.o. male.    1/19/2024   Dr Camp's Patient presents with:  Leg Pain: Outside thigh on down for 1 week, no injury              Type 2 Diabetes Mellitus diagnosed in 2014    On insulin since APRIL 2018     Trulicity caused severe GI side effects   Invokana caused urinary issues       Metformin 1000 mg bid.   Toujeo 38 units daily at 6am. No TTT.      Last A1C 8.4     Leg Pain   The incident occurred more than 1 week ago. The incident occurred at home. Injury mechanism: was trying to avoid falling off his bad and is possible he hurt himself then ! The pain is moderate. The pain has been Fluctuating since onset. Pertinent negatives include no loss of motion, loss of sensation, muscle weakness, numbness or tingling. The symptoms are aggravated by movement. He has tried acetaminophen and NSAIDs for the symptoms. The treatment provided mild relief.        Review of Systems   Gastrointestinal:         Colonoscopy 2021  . Good for 10 yrs   Endocrine:        Diabetes 2014   Musculoskeletal:  Positive for arthralgias and myalgias.   Neurological:  Negative for tingling and numbness.       Objective:   Physical Exam  Vitals and nursing note reviewed.   Constitutional:       General: He is not in acute distress.     Appearance: He is obese.   Cardiovascular:      Rate and Rhythm: Normal rate and regular rhythm.      Heart sounds: Normal heart sounds.   Pulmonary:      Breath sounds: Normal breath sounds.   Abdominal:      General: Bowel sounds are normal. There is distension.      Palpations: Abdomen is soft.   Musculoskeletal:         General: Normal range of motion.   Skin:     General: Skin is warm.      Findings: No rash.   Neurological:      General: No focal deficit present.      Mental Status: He is oriented to person, place, and time.      Sensory: No sensory deficit.   Psychiatric:         Behavior: Behavior normal.         Assessment:   Seth was seen today

## 2024-02-08 ENCOUNTER — TELEPHONE (OUTPATIENT)
Dept: PRIMARY CARE CLINIC | Age: 54
End: 2024-02-08

## 2024-02-08 NOTE — TELEPHONE ENCOUNTER
Called pt and informed him that we have not received the [employer] yet and that we will call him once we have it and it Is completed and faxed

## 2024-02-13 ENCOUNTER — TELEPHONE (OUTPATIENT)
Dept: PRIMARY CARE CLINIC | Age: 54
End: 2024-02-13

## 2024-02-28 DIAGNOSIS — E11.9 TYPE 2 DIABETES MELLITUS WITHOUT COMPLICATION, WITHOUT LONG-TERM CURRENT USE OF INSULIN (HCC): ICD-10-CM

## 2024-02-28 DIAGNOSIS — E78.5 DYSLIPIDEMIA ASSOCIATED WITH TYPE 2 DIABETES MELLITUS (HCC): ICD-10-CM

## 2024-02-28 DIAGNOSIS — E11.40 POORLY CONTROLLED TYPE 2 DIABETES MELLITUS WITH NEUROPATHY (HCC): ICD-10-CM

## 2024-02-28 DIAGNOSIS — E11.65 POORLY CONTROLLED TYPE 2 DIABETES MELLITUS WITH NEUROPATHY (HCC): ICD-10-CM

## 2024-02-28 DIAGNOSIS — E11.69 DYSLIPIDEMIA ASSOCIATED WITH TYPE 2 DIABETES MELLITUS (HCC): ICD-10-CM

## 2024-02-28 DIAGNOSIS — I10 ESSENTIAL HYPERTENSION: ICD-10-CM

## 2024-02-28 DIAGNOSIS — E66.01 MORBID OBESITY DUE TO EXCESS CALORIES (HCC): ICD-10-CM

## 2024-03-02 ENCOUNTER — HOSPITAL ENCOUNTER (EMERGENCY)
Age: 54
Discharge: HOME | End: 2024-03-02
Payer: COMMERCIAL

## 2024-03-02 ENCOUNTER — APPOINTMENT (OUTPATIENT)
Dept: GENERAL RADIOLOGY | Age: 54
End: 2024-03-02
Payer: COMMERCIAL

## 2024-03-02 VITALS
SYSTOLIC BLOOD PRESSURE: 143 MMHG | BODY MASS INDEX: 31.64 KG/M2 | HEIGHT: 71 IN | RESPIRATION RATE: 16 BRPM | DIASTOLIC BLOOD PRESSURE: 95 MMHG | TEMPERATURE: 98.6 F | HEART RATE: 83 BPM | OXYGEN SATURATION: 96 % | WEIGHT: 226 LBS

## 2024-03-02 DIAGNOSIS — M79.605 ACUTE PAIN OF LEFT LOWER EXTREMITY: ICD-10-CM

## 2024-03-02 DIAGNOSIS — M79.605 LEFT LEG PAIN: Primary | ICD-10-CM

## 2024-03-02 PROCEDURE — 73552 X-RAY EXAM OF FEMUR 2/>: CPT

## 2024-03-02 PROCEDURE — 99283 EMERGENCY DEPT VISIT LOW MDM: CPT

## 2024-03-02 RX ORDER — ACETAMINOPHEN AND CODEINE PHOSPHATE 300; 30 MG/1; MG/1
1 TABLET ORAL EVERY 4 HOURS PRN
Qty: 7 TABLET | Refills: 0 | Status: SHIPPED | OUTPATIENT
Start: 2024-03-02 | End: 2024-03-05

## 2024-03-02 RX ORDER — GABAPENTIN 100 MG/1
100 CAPSULE ORAL 2 TIMES DAILY
Qty: 28 CAPSULE | Refills: 0 | Status: SHIPPED | OUTPATIENT
Start: 2024-03-02 | End: 2024-03-16

## 2024-03-02 ASSESSMENT — PAIN SCALES - GENERAL: PAINLEVEL_OUTOF10: 4

## 2024-03-02 ASSESSMENT — ENCOUNTER SYMPTOMS
COLOR CHANGE: 0
SHORTNESS OF BREATH: 0
STRIDOR: 0
WHEEZING: 0
BACK PAIN: 0
ABDOMINAL PAIN: 0
COUGH: 0

## 2024-03-02 NOTE — ED PROVIDER NOTES
[facility] EMERGENCY DEPARTMENT  EMERGENCY DEPARTMENT ENCOUNTER        Pt Name: Seth Villavicencio  MRN: [id number]  Birthdate 1970  Date of evaluation: 3/2/2024  Provider: Mitul Gan PA-C  PCP: Magan Huynh MD  Note Started: 12:11 PM EST 3/2/24      SHERRI. I have evaluated this patient.        CHIEF COMPLAINT       Chief Complaint   Patient presents with    Leg Pain     Pt to ED c/o left leg x1 month after a really bad charily horse. PT saw PCP and was prescribed meloxicam but it has not helped his pain. PT is taking tylenol arthritis and is not helping either. PT denies injury. PT pain 10/10. Pain is sharp. PT is not a smoker.        HISTORY OF PRESENT ILLNESS: 1 or more Elements     History from : Patient    Limitations to history : None    Seth Villavicencio is a 54 y.o. male insulin dependent diabetic who presents to the emergency department complaining of left leg pain on and off for 1 month.  He states that initially he got a charley horse/cramping sensation in his left leg.  After this episode 1 month ago he has had intermittent pain in the left leg.  He denies any swelling or discoloration or rash.  He denies any preceding injury.  He walked into the emergency department without difficulty.  He was prescribed Mobic by his PCP without any significant relief of his pain.  He describes the pain as sharp shooting pain that occasionally radiates from his left thigh to the left lower leg.  He denies numbness, tingling or weakness.  He states that his glucose readings have been stable.    Nursing Notes were all reviewed and agreed with or any disagreements were addressed in the HPI.    REVIEW OF SYSTEMS :      Review of Systems   Constitutional:  Negative for chills and fever.   HENT: Negative.     Eyes:  Negative for visual disturbance.   Respiratory:  Negative for cough, shortness of breath, wheezing and stridor.    Cardiovascular:  Negative for chest pain, palpitations and  [address]  [facility] [address]  [phone]      orthopedic follow up      DISCHARGE MEDICATIONS:  Discharge Medication List as of 3/2/2024 12:37 PM        START taking these medications    Details   gabapentin (NEURONTIN) 100 MG capsule Take 1 capsule by mouth 2 times daily for 14 days. Intended supply: 90 days, Disp-28 capsule, R-0Print      acetaminophen-codeine (TYLENOL/CODEINE #3) 300-30 MG per tablet Take 1 tablet by mouth every 4 hours as needed for Pain for up to 3 days. Intended supply: 3 days. Take lowest dose possible to manage pain Max Daily Amount: 6 tablets, Disp-7 tablet, R-0Print             DISCONTINUED MEDICATIONS:  Discharge Medication List as of 3/2/2024 12:37 PM                 (Please note that portions of this note were completed with a voice recognition program.  Efforts were made to edit the dictations but occasionally words are mis-transcribed.)    Mitul Gan PA-C (electronically signed)            Mitul Gan PA-C  03/02/24 [id number]

## 2024-03-05 NOTE — TELEPHONE ENCOUNTER
Medication:   Requested Prescriptions     Pending Prescriptions Disp Refills    sildenafil (VIAGRA) 100 MG tablet [Pharmacy Med Name: Sildenafil Citrate 100 MG Oral Tablet] 10 tablet 0     Sig: TAKE 1 TABLET BY MOUTH ONCE DAILY AS NEEDED        Last Filled:      Patient Phone Number: [phone] (home) [phone] (work)    Last appt: 3/22/2022   Next appt: Visit date not found    Last OARRS: No flowsheet data found. (1) Other Medications/None

## 2024-03-25 NOTE — TELEPHONE ENCOUNTER
Medication:   Requested Prescriptions     Pending Prescriptions Disp Refills    VIAGRA 100 MG tablet [Pharmacy Med Name: Viagra 100 MG Oral Tablet] 12 tablet 0     Sig: TAKE 1 TABLET BY MOUTH AS NEEDED FOR ERECTILE DYSFUNCTION        Last appt: 1/19/2024   Next appt: Visit date not found

## 2024-03-26 RX ORDER — SILDENAFIL CITRATE 100 MG
100 TABLET ORAL PRN
Qty: 12 TABLET | Refills: 0 | Status: SHIPPED | OUTPATIENT
Start: 2024-03-26

## 2024-04-11 DIAGNOSIS — E11.65 POORLY CONTROLLED TYPE 2 DIABETES MELLITUS WITH NEUROPATHY (HCC): ICD-10-CM

## 2024-04-11 DIAGNOSIS — E11.9 TYPE 2 DIABETES MELLITUS WITHOUT COMPLICATION, WITHOUT LONG-TERM CURRENT USE OF INSULIN (HCC): ICD-10-CM

## 2024-04-11 DIAGNOSIS — E78.5 DYSLIPIDEMIA ASSOCIATED WITH TYPE 2 DIABETES MELLITUS (HCC): ICD-10-CM

## 2024-04-11 DIAGNOSIS — I10 ESSENTIAL HYPERTENSION: ICD-10-CM

## 2024-04-11 DIAGNOSIS — E66.01 MORBID OBESITY DUE TO EXCESS CALORIES (HCC): ICD-10-CM

## 2024-04-11 DIAGNOSIS — E11.69 DYSLIPIDEMIA ASSOCIATED WITH TYPE 2 DIABETES MELLITUS (HCC): ICD-10-CM

## 2024-04-11 DIAGNOSIS — E11.40 POORLY CONTROLLED TYPE 2 DIABETES MELLITUS WITH NEUROPATHY (HCC): ICD-10-CM

## 2024-04-11 RX ORDER — INSULIN GLARGINE AND LIXISENATIDE 100; 33 U/ML; UG/ML
INJECTION, SOLUTION SUBCUTANEOUS
Qty: 5 ADJUSTABLE DOSE PRE-FILLED PEN SYRINGE | Refills: 0 | Status: SHIPPED | OUTPATIENT
Start: 2024-04-11

## 2024-04-11 RX ORDER — INSULIN GLARGINE 300 U/ML
INJECTION, SOLUTION SUBCUTANEOUS
Qty: 9 ML | Refills: 0 | OUTPATIENT
Start: 2024-04-11

## 2024-04-11 NOTE — TELEPHONE ENCOUNTER
Mr. Villavicencio informed refill sent.  Advised to keep follow up as would be his third no show.

## 2024-04-19 NOTE — TELEPHONE ENCOUNTER
Called pt and informed him that we have not received form faxed form [employer] [employer].. confirmed fax number and advised pt to have them fax form again   
Form received and faxed to [employer]   
HERNA[facility] form received, are you ok with signing off on the form for Dr Camp?  Please advise  
Pt is calling asking if we have received his [employer] form, please contact pt once forms are received   
3

## 2024-04-22 ENCOUNTER — TELEPHONE (OUTPATIENT)
Dept: PRIMARY CARE CLINIC | Age: 54
End: 2024-04-22

## 2024-04-22 NOTE — TELEPHONE ENCOUNTER
----- Message from Robbie Garcia sent at 4/22/2024  8:17 AM EDT -----  Regarding: E[facility] Appointment Request  E[facility] Appointment Request    Patient needs appointment for E[facility] Appointment Type: ED Follow-Up.    Reason for Appointment Request: No appointments available during search. Reschedule the appt. for May 23, 2024 for ED Follow-Up 8:40 am to April 23, 2024 same time. Accidentally click the month of May instead of April.  --------------------------------------------------------------------------------------------------------------------------    Relationship to Patient: Self     Call Back Information: OK to leave message on voicemail  Preferred Call Back Number: Phone [phone] (home) [phone] (work)

## 2024-04-24 ENCOUNTER — OFFICE VISIT (OUTPATIENT)
Dept: PRIMARY CARE CLINIC | Age: 54
End: 2024-04-24
Payer: COMMERCIAL

## 2024-04-24 VITALS
HEIGHT: 71 IN | TEMPERATURE: 97.4 F | OXYGEN SATURATION: 98 % | SYSTOLIC BLOOD PRESSURE: 124 MMHG | RESPIRATION RATE: 16 BRPM | HEART RATE: 82 BPM | DIASTOLIC BLOOD PRESSURE: 82 MMHG | BODY MASS INDEX: 30.52 KG/M2 | WEIGHT: 218 LBS

## 2024-04-24 DIAGNOSIS — Z12.5 PROSTATE CANCER SCREENING: Primary | ICD-10-CM

## 2024-04-24 DIAGNOSIS — E11.9 TYPE 2 DIABETES MELLITUS WITHOUT COMPLICATION, WITHOUT LONG-TERM CURRENT USE OF INSULIN (HCC): ICD-10-CM

## 2024-04-24 DIAGNOSIS — E11.69 HYPERLIPIDEMIA ASSOCIATED WITH TYPE 2 DIABETES MELLITUS (HCC): ICD-10-CM

## 2024-04-24 DIAGNOSIS — E55.9 VITAMIN D DEFICIENCY: ICD-10-CM

## 2024-04-24 DIAGNOSIS — E11.8 TYPE 2 DIABETES MELLITUS WITH COMPLICATION, WITH LONG-TERM CURRENT USE OF INSULIN (HCC): ICD-10-CM

## 2024-04-24 DIAGNOSIS — M79.605 MUSCULOSKELETAL PAIN OF LEFT LOWER EXTREMITY: ICD-10-CM

## 2024-04-24 DIAGNOSIS — E78.5 DYSLIPIDEMIA ASSOCIATED WITH TYPE 2 DIABETES MELLITUS (HCC): ICD-10-CM

## 2024-04-24 DIAGNOSIS — E11.65 POORLY CONTROLLED TYPE 2 DIABETES MELLITUS WITH NEUROPATHY (HCC): ICD-10-CM

## 2024-04-24 DIAGNOSIS — E78.5 HYPERLIPIDEMIA ASSOCIATED WITH TYPE 2 DIABETES MELLITUS (HCC): ICD-10-CM

## 2024-04-24 DIAGNOSIS — E66.01 MORBID OBESITY DUE TO EXCESS CALORIES (HCC): ICD-10-CM

## 2024-04-24 DIAGNOSIS — Z79.4 TYPE 2 DIABETES MELLITUS WITH COMPLICATION, WITH LONG-TERM CURRENT USE OF INSULIN (HCC): ICD-10-CM

## 2024-04-24 DIAGNOSIS — Z12.5 PROSTATE CANCER SCREENING: ICD-10-CM

## 2024-04-24 DIAGNOSIS — E11.40 POORLY CONTROLLED TYPE 2 DIABETES MELLITUS WITH NEUROPATHY (HCC): ICD-10-CM

## 2024-04-24 DIAGNOSIS — I10 ESSENTIAL HYPERTENSION: ICD-10-CM

## 2024-04-24 DIAGNOSIS — M79.605 LEFT LEG PAIN: ICD-10-CM

## 2024-04-24 DIAGNOSIS — E11.69 DYSLIPIDEMIA ASSOCIATED WITH TYPE 2 DIABETES MELLITUS (HCC): ICD-10-CM

## 2024-04-24 LAB
25(OH)D3 SERPL-MCNC: 6 NG/ML
ALBUMIN SERPL-MCNC: 4.3 G/DL (ref 3.4–5)
ALBUMIN/GLOB SERPL: 1.7 {RATIO} (ref 1.1–2.2)
ALP SERPL-CCNC: 41 U/L (ref 40–129)
ALT SERPL-CCNC: 25 U/L (ref 10–40)
ANION GAP SERPL CALCULATED.3IONS-SCNC: 11 MMOL/L (ref 3–16)
AST SERPL-CCNC: 16 U/L (ref 15–37)
BACTERIA URNS QL MICRO: ABNORMAL /HPF
BASOPHILS # BLD: 0.1 K/UL (ref 0–0.2)
BASOPHILS NFR BLD: 1.1 %
BILIRUB SERPL-MCNC: <0.2 MG/DL (ref 0–1)
BILIRUB UR QL STRIP.AUTO: NEGATIVE
BUN SERPL-MCNC: 10 MG/DL (ref 7–20)
CALCIUM SERPL-MCNC: 9.6 MG/DL (ref 8.3–10.6)
CHLORIDE SERPL-SCNC: 107 MMOL/L (ref 99–110)
CLARITY UR: CLEAR
CO2 SERPL-SCNC: 26 MMOL/L (ref 21–32)
COLOR UR: YELLOW
CREAT SERPL-MCNC: 0.7 MG/DL (ref 0.9–1.3)
DEPRECATED RDW RBC AUTO: 12.8 % (ref 12.4–15.4)
EOSINOPHIL # BLD: 0.1 K/UL (ref 0–0.6)
EOSINOPHIL NFR BLD: 1.7 %
EPI CELLS #/AREA URNS AUTO: 0 /HPF (ref 0–5)
GFR SERPLBLD CREATININE-BSD FMLA CKD-EPI: >90 ML/MIN/{1.73_M2}
GLUCOSE SERPL-MCNC: 143 MG/DL (ref 70–99)
GLUCOSE UR STRIP.AUTO-MCNC: 100 MG/DL
HCT VFR BLD AUTO: 36.7 % (ref 40.5–52.5)
HGB BLD-MCNC: 12.4 G/DL (ref 13.5–17.5)
HGB UR QL STRIP.AUTO: NEGATIVE
HYALINE CASTS #/AREA URNS AUTO: 1 /LPF (ref 0–8)
KETONES UR STRIP.AUTO-MCNC: NEGATIVE MG/DL
LEUKOCYTE ESTERASE UR QL STRIP.AUTO: ABNORMAL
LYMPHOCYTES # BLD: 2.2 K/UL (ref 1–5.1)
LYMPHOCYTES NFR BLD: 37.9 %
MCH RBC QN AUTO: 28.7 PG (ref 26–34)
MCHC RBC AUTO-ENTMCNC: 33.9 G/DL (ref 31–36)
MCV RBC AUTO: 84.6 FL (ref 80–100)
MONOCYTES # BLD: 0.5 K/UL (ref 0–1.3)
MONOCYTES NFR BLD: 8.4 %
NEUTROPHILS # BLD: 3 K/UL (ref 1.7–7.7)
NEUTROPHILS NFR BLD: 50.9 %
NITRITE UR QL STRIP.AUTO: NEGATIVE
PH UR STRIP.AUTO: 5.5 [PH] (ref 5–8)
PLATELET # BLD AUTO: 266 K/UL (ref 135–450)
PMV BLD AUTO: 9.1 FL (ref 5–10.5)
POTASSIUM SERPL-SCNC: 4.5 MMOL/L (ref 3.5–5.1)
PROT SERPL-MCNC: 6.8 G/DL (ref 6.4–8.2)
PROT UR STRIP.AUTO-MCNC: ABNORMAL MG/DL
PSA SERPL DL<=0.01 NG/ML-MCNC: 1.52 NG/ML (ref 0–4)
RBC # BLD AUTO: 4.34 M/UL (ref 4.2–5.9)
RBC CLUMPS #/AREA URNS AUTO: 0 /HPF (ref 0–4)
SODIUM SERPL-SCNC: 144 MMOL/L (ref 136–145)
SP GR UR STRIP.AUTO: 1.02 (ref 1–1.03)
TSH SERPL DL<=0.005 MIU/L-ACNC: 3.3 UIU/ML (ref 0.27–4.2)
UA DIPSTICK W REFLEX MICRO PNL UR: YES
URN SPEC COLLECT METH UR: ABNORMAL
UROBILINOGEN UR STRIP-ACNC: 0.2 E.U./DL
VIT B12 SERPL-MCNC: 223 PG/ML (ref 211–911)
WBC # BLD AUTO: 5.9 K/UL (ref 4–11)
WBC #/AREA URNS AUTO: 5 /HPF (ref 0–5)

## 2024-04-24 PROCEDURE — 3052F HG A1C>EQUAL 8.0%<EQUAL 9.0%: CPT | Performed by: INTERNAL MEDICINE

## 2024-04-24 PROCEDURE — 3079F DIAST BP 80-89 MM HG: CPT | Performed by: INTERNAL MEDICINE

## 2024-04-24 PROCEDURE — 99214 OFFICE O/P EST MOD 30 MIN: CPT | Performed by: INTERNAL MEDICINE

## 2024-04-24 PROCEDURE — 3074F SYST BP LT 130 MM HG: CPT | Performed by: INTERNAL MEDICINE

## 2024-04-24 RX ORDER — IBUPROFEN 600 MG/1
600 TABLET ORAL 4 TIMES DAILY PRN
Qty: 40 TABLET | Refills: 0 | Status: SHIPPED | OUTPATIENT
Start: 2024-04-24 | End: 2024-04-25 | Stop reason: SINTOL

## 2024-04-24 RX ORDER — ATORVASTATIN CALCIUM 40 MG/1
40 TABLET, FILM COATED ORAL DAILY
Qty: 90 TABLET | Refills: 3 | Status: SHIPPED | OUTPATIENT
Start: 2024-04-24

## 2024-04-24 RX ORDER — CYCLOBENZAPRINE HCL 10 MG
10 TABLET ORAL NIGHTLY PRN
Qty: 10 TABLET | Refills: 0 | Status: SHIPPED | OUTPATIENT
Start: 2024-04-24 | End: 2024-05-04

## 2024-04-24 NOTE — PROGRESS NOTES
Seth Villavicencio (:  1970) is a 54 y.o. male,Established patient, here for evaluation of the following chief complaint(s):  Leg Pain (Left- )      Assessment & Plan   ASSESSMENT/PLAN:  1. Prostate cancer screening needed, no LUTS.  -     Microalbumin / Creatinine Urine Ratio; Future  -     Urinalysis with Microscopic; Future  -     PSA Screening; Future  2. Poorly controlled type 2 diabetes mellitus with neuropathy (HCC)  Lab Results   Component Value Date    LABA1C 8.3 2024    LABA1C 10.8 2023    LABA1C 9.8 2022     Lab Results   Component Value Date    MALBCR 28.6 2024    LDLCALC 112 (H) 2023    CREATININE 0.7 (L) 2024     Much improved under care of endocrinology, continue.   3. Dyslipidemia associated with type 2 diabetes mellitus (HCC) tolerating atorvastatin 40 mg qd, will refill.  Add direct LDL to lab.  Lab Results   Component Value Date    CHOL 218 (H) 2017    CHOL 169 2016    CHOL 218 (H) 2016     Lab Results   Component Value Date    TRIG 236 (H) 2017    TRIG 140 2016    TRIG 223 (H) 2016     Lab Results   Component Value Date    HDL 40 2023    HDL 37 (L) 2022    HDL 35 (L) 2021     Lab Results   Component Value Date    LDLCALC 112 (H) 2023    LDLCALC 59 2022    LDLCALC 42 2021     No results found for: \"VLDL\"  No results found for: \"CHOLHDLRATIO\"    4. Essential hypertension controlled with diet .   BP Readings from Last 3 Encounters:   24 124/82   24 (!) 143/95   24 120/70      -     CBC with Auto Differential; Future  -     TSH with Reflex to FT4; Future  5. Type 2 diabetes mellitus without complication, without long-term current use of insulin (HCC)  Same as problem #2  6. Morbid obesity due to excess calories (HCC), will resolved problem.  Wt Readings from Last 3 Encounters:   24 98.9 kg (218 lb)   24 102.5 kg (226 lb)   24 101.7 kg (224 lb 3.2 oz)

## 2024-04-25 DIAGNOSIS — E55.9 VITAMIN D DEFICIENCY: Primary | ICD-10-CM

## 2024-04-25 LAB
CREAT UR-MCNC: 94.3 MG/DL (ref 39–259)
EST. AVERAGE GLUCOSE BLD GHB EST-MCNC: 191.5 MG/DL
HBA1C MFR BLD: 8.3 %
MICROALBUMIN UR DL<=1MG/L-MCNC: 2.7 MG/DL
MICROALBUMIN/CREAT UR: 28.6 MG/G (ref 0–30)

## 2024-04-25 RX ORDER — ERGOCALCIFEROL 1.25 MG/1
50000 CAPSULE ORAL WEEKLY
Qty: 12 CAPSULE | Refills: 3 | Status: SHIPPED | OUTPATIENT
Start: 2024-04-25

## 2024-04-26 PROBLEM — E66.01 MORBID OBESITY DUE TO EXCESS CALORIES (HCC): Status: RESOLVED | Noted: 2018-11-19 | Resolved: 2024-04-26

## 2024-04-26 LAB — FRUCTOSAMINE SERPL-SCNC: 312 UMOL/L (ref 205–285)

## 2024-04-26 ASSESSMENT — ENCOUNTER SYMPTOMS: SHORTNESS OF BREATH: 0

## 2024-04-26 NOTE — RESULT ENCOUNTER NOTE
The protein in the urine is improved.  It is almost normal.  Follow-up with another microalbumin level at your next visit.  The urinalysis shows no infection.  Normal kidney and liver blood test.  I recently gave you prescription for ibuprofen.  This can sometimes cause protein in the urine.  Do not take it more than once or twice a week.  This is for arthritis pain.  Do not refill the prescription.  I will remove it from your medication list.  Your vitamin D level is low.  Normally the vitamin D level should be above 30.  A level of 50 is ideal.  Vitamin D is important for healthy bones and a healthy immune system.  Low vitamin D levels making you more prone to getting viral infections.  Also a chronically low vitamin D level is a risk factor for breast and colon cancer.  I sent vitamin D to the pharmacy.  There is no anemia.    The diabetes continues to improve under the direction of your diabetic specialist.  Continue follow-up appointments with the diabetic specialist.

## 2024-04-26 NOTE — RESULT ENCOUNTER NOTE
The blood test shows your blood sugars are coming down slowly and much improved under the care of your endocrinologist.  Continue with your excellent endocrinologist.

## 2024-04-27 DIAGNOSIS — E78.5 DYSLIPIDEMIA ASSOCIATED WITH TYPE 2 DIABETES MELLITUS (HCC): ICD-10-CM

## 2024-04-27 DIAGNOSIS — E11.69 DYSLIPIDEMIA ASSOCIATED WITH TYPE 2 DIABETES MELLITUS (HCC): ICD-10-CM

## 2024-04-27 LAB — LDLC SERPL-MCNC: 58 MG/DL

## 2024-04-29 RX ORDER — SILDENAFIL CITRATE 100 MG
100 TABLET ORAL PRN
Qty: 12 TABLET | Refills: 0 | OUTPATIENT
Start: 2024-04-29

## 2024-05-01 ENCOUNTER — HOSPITAL ENCOUNTER (OUTPATIENT)
Dept: GENERAL RADIOLOGY | Age: 54
Discharge: HOME | End: 2024-05-01
Payer: COMMERCIAL

## 2024-05-01 ENCOUNTER — HOSPITAL ENCOUNTER (OUTPATIENT)
Age: 54
Discharge: HOME | End: 2024-05-01
Payer: COMMERCIAL

## 2024-05-01 DIAGNOSIS — M79.605 LEFT LEG PAIN: ICD-10-CM

## 2024-05-01 PROCEDURE — 72100 X-RAY EXAM L-S SPINE 2/3 VWS: CPT

## 2024-05-01 PROCEDURE — 73502 X-RAY EXAM HIP UNI 2-3 VIEWS: CPT

## 2024-05-02 NOTE — RESULT ENCOUNTER NOTE
The x-ray shows bulging disks of the lower back and arthritis that can impinge the nerves and cause leg pain.  If you are not improving I will send you to a spine specialist.

## 2024-05-03 ENCOUNTER — TELEPHONE (OUTPATIENT)
Dept: INTERNAL MEDICINE CLINIC | Age: 54
End: 2024-05-03

## 2024-05-03 NOTE — TELEPHONE ENCOUNTER
Pt had scan on hip tues.  Wanted to find out the results.  His New Pt appt is not till 5/30.    Please advise

## 2024-05-06 DIAGNOSIS — M47.26 OTHER SPONDYLOSIS WITH RADICULOPATHY, LUMBAR REGION: Primary | ICD-10-CM

## 2024-05-09 ENCOUNTER — TELEPHONE (OUTPATIENT)
Dept: INTERNAL MEDICINE CLINIC | Age: 54
End: 2024-05-09

## 2024-05-09 ENCOUNTER — TELEPHONE (OUTPATIENT)
Dept: ENDOCRINOLOGY | Age: 54
End: 2024-05-09

## 2024-05-09 DIAGNOSIS — E11.65 POORLY CONTROLLED TYPE 2 DIABETES MELLITUS WITH NEUROPATHY (HCC): ICD-10-CM

## 2024-05-09 DIAGNOSIS — E11.9 TYPE 2 DIABETES MELLITUS WITHOUT COMPLICATION, WITHOUT LONG-TERM CURRENT USE OF INSULIN (HCC): ICD-10-CM

## 2024-05-09 DIAGNOSIS — E11.40 POORLY CONTROLLED TYPE 2 DIABETES MELLITUS WITH NEUROPATHY (HCC): ICD-10-CM

## 2024-05-09 DIAGNOSIS — E11.69 DYSLIPIDEMIA ASSOCIATED WITH TYPE 2 DIABETES MELLITUS (HCC): ICD-10-CM

## 2024-05-09 DIAGNOSIS — E66.01 MORBID OBESITY DUE TO EXCESS CALORIES (HCC): ICD-10-CM

## 2024-05-09 DIAGNOSIS — E78.5 DYSLIPIDEMIA ASSOCIATED WITH TYPE 2 DIABETES MELLITUS (HCC): ICD-10-CM

## 2024-05-09 DIAGNOSIS — E11.40 POORLY CONTROLLED TYPE 2 DIABETES MELLITUS WITH NEUROPATHY (HCC): Primary | ICD-10-CM

## 2024-05-09 DIAGNOSIS — I10 ESSENTIAL HYPERTENSION: ICD-10-CM

## 2024-05-09 DIAGNOSIS — E11.65 POORLY CONTROLLED TYPE 2 DIABETES MELLITUS WITH NEUROPATHY (HCC): Primary | ICD-10-CM

## 2024-05-09 RX ORDER — INSULIN GLARGINE 300 U/ML
INJECTION, SOLUTION SUBCUTANEOUS
Qty: 9 ML | Refills: 0 | OUTPATIENT
Start: 2024-05-09

## 2024-05-09 RX ORDER — (INSULIN DEGLUDEC AND LIRAGLUTIDE) 100; 3.6 [IU]/ML; MG/ML
INJECTION, SOLUTION SUBCUTANEOUS
Qty: 5 ADJUSTABLE DOSE PRE-FILLED PEN SYRINGE | Refills: 0 | Status: SHIPPED | OUTPATIENT
Start: 2024-05-09

## 2024-05-09 NOTE — TELEPHONE ENCOUNTER
Pt calling back and states he's going to run out of insulin before his next appt on May 20th since they only gave him 3 pens    Pt states that his insurance will not cover Soliqua but will cover Toujeo. Pt is asking for a rx for Toujeo to be sent to [facility] on [address] [facility]# [phone]

## 2024-05-09 NOTE — TELEPHONE ENCOUNTER
Medication Request...    Patient requests refill on insulin prior to 5/30/24 New Patient appointment with Dr. Flynn. Advised  would need to contact previous provider's office for refill. Dr. Flynn is unable to refill prescription until patient is seen.

## 2024-05-09 NOTE — TELEPHONE ENCOUNTER
Call form pt stating that he only received 3 pens of his insulin Soliqua 100-33 unit-MCG     Pt is wanting to know if Dr. Khan could send in a new script for the Soliqua     Please advise

## 2024-05-09 NOTE — TELEPHONE ENCOUNTER
Mr. Villavicencio stated he is not able to come in Monday due to work.  He has follow up 5/20 which he can make.

## 2024-05-20 ENCOUNTER — OFFICE VISIT (OUTPATIENT)
Dept: ENDOCRINOLOGY | Age: 54
End: 2024-05-20
Payer: COMMERCIAL

## 2024-05-20 VITALS
SYSTOLIC BLOOD PRESSURE: 120 MMHG | WEIGHT: 216.4 LBS | HEART RATE: 87 BPM | DIASTOLIC BLOOD PRESSURE: 84 MMHG | BODY MASS INDEX: 30.3 KG/M2 | HEIGHT: 71 IN | OXYGEN SATURATION: 96 %

## 2024-05-20 DIAGNOSIS — E11.69 DYSLIPIDEMIA ASSOCIATED WITH TYPE 2 DIABETES MELLITUS (HCC): ICD-10-CM

## 2024-05-20 DIAGNOSIS — E66.01 MORBID OBESITY DUE TO EXCESS CALORIES (HCC): ICD-10-CM

## 2024-05-20 DIAGNOSIS — E78.5 DYSLIPIDEMIA ASSOCIATED WITH TYPE 2 DIABETES MELLITUS (HCC): ICD-10-CM

## 2024-05-20 DIAGNOSIS — E11.9 TYPE 2 DIABETES MELLITUS WITHOUT COMPLICATION, WITHOUT LONG-TERM CURRENT USE OF INSULIN (HCC): ICD-10-CM

## 2024-05-20 DIAGNOSIS — I10 ESSENTIAL HYPERTENSION: ICD-10-CM

## 2024-05-20 DIAGNOSIS — E11.40 POORLY CONTROLLED TYPE 2 DIABETES MELLITUS WITH NEUROPATHY (HCC): Primary | ICD-10-CM

## 2024-05-20 DIAGNOSIS — E11.65 POORLY CONTROLLED TYPE 2 DIABETES MELLITUS WITH NEUROPATHY (HCC): Primary | ICD-10-CM

## 2024-05-20 PROCEDURE — 3074F SYST BP LT 130 MM HG: CPT | Performed by: INTERNAL MEDICINE

## 2024-05-20 PROCEDURE — 99214 OFFICE O/P EST MOD 30 MIN: CPT | Performed by: INTERNAL MEDICINE

## 2024-05-20 PROCEDURE — 3052F HG A1C>EQUAL 8.0%<EQUAL 9.0%: CPT | Performed by: INTERNAL MEDICINE

## 2024-05-20 PROCEDURE — 3079F DIAST BP 80-89 MM HG: CPT | Performed by: INTERNAL MEDICINE

## 2024-05-20 RX ORDER — INSULIN GLARGINE 300 U/ML
38 INJECTION, SOLUTION SUBCUTANEOUS EVERY MORNING
Qty: 2 ADJUSTABLE DOSE PRE-FILLED PEN SYRINGE | Refills: 5 | Status: SHIPPED | OUTPATIENT
Start: 2024-05-20

## 2024-05-20 NOTE — PROGRESS NOTES
potential complications of diabetes were reviewed with the patient.  Diabetes health maintenance plan and follow-up were discussed and understood by the patient.  We reviewed the importance of medication compliance and regular follow-up.   Aggressive lifestyle modification was encouraged.     Exercise Counselling:  This patient is a candidate for regular physical exercise. Instructions to perform the following types of exercise:  Swimming or water aerobic exercise  Brisk walking  Playing tennis  Stationary bicycle or elliptical indoor  Low impact aerobic exercise    Instructions given to exercise for the following duration:  30 minutes a day for five-seven days per week.    Following instructions for being active throughout the day in addition to formal exercise:  Walk instead of drive whenever possible  Take the stairs instead of the elevator  Work in the garden  Park to the far end of the parking lot to add more walking steps to destination      Electronically signed by TIM KEYES MD on 5/20/2024 at 10:11 AM

## 2024-05-28 SDOH — HEALTH STABILITY: PHYSICAL HEALTH: ON AVERAGE, HOW MANY DAYS PER WEEK DO YOU ENGAGE IN MODERATE TO STRENUOUS EXERCISE (LIKE A BRISK WALK)?: 2 DAYS

## 2024-05-30 ENCOUNTER — OFFICE VISIT (OUTPATIENT)
Dept: INTERNAL MEDICINE CLINIC | Age: 54
End: 2024-05-30
Payer: COMMERCIAL

## 2024-05-30 ENCOUNTER — TELEPHONE (OUTPATIENT)
Dept: INTERNAL MEDICINE CLINIC | Age: 54
End: 2024-05-30

## 2024-05-30 VITALS
HEIGHT: 71 IN | WEIGHT: 216 LBS | HEART RATE: 84 BPM | OXYGEN SATURATION: 94 % | SYSTOLIC BLOOD PRESSURE: 104 MMHG | DIASTOLIC BLOOD PRESSURE: 66 MMHG | BODY MASS INDEX: 30.24 KG/M2

## 2024-05-30 DIAGNOSIS — Z79.4 TYPE 2 DIABETES MELLITUS WITH HYPERGLYCEMIA, WITH LONG-TERM CURRENT USE OF INSULIN (HCC): ICD-10-CM

## 2024-05-30 DIAGNOSIS — E66.01 MORBID OBESITY DUE TO EXCESS CALORIES (HCC): ICD-10-CM

## 2024-05-30 DIAGNOSIS — E11.65 TYPE 2 DIABETES MELLITUS WITH HYPERGLYCEMIA, WITH LONG-TERM CURRENT USE OF INSULIN (HCC): ICD-10-CM

## 2024-05-30 DIAGNOSIS — Z00.00 WELL ADULT EXAM: Primary | ICD-10-CM

## 2024-05-30 DIAGNOSIS — N52.1 ERECTILE DYSFUNCTION DUE TO DISEASES CLASSIFIED ELSEWHERE: ICD-10-CM

## 2024-05-30 PROBLEM — R74.8 ELEVATED LIVER ENZYMES: Status: RESOLVED | Noted: 2020-04-13 | Resolved: 2024-05-30

## 2024-05-30 PROBLEM — E11.40 POORLY CONTROLLED TYPE 2 DIABETES MELLITUS WITH NEUROPATHY (HCC): Status: RESOLVED | Noted: 2023-08-21 | Resolved: 2024-05-30

## 2024-05-30 PROBLEM — E11.9 TYPE 2 DIABETES MELLITUS WITHOUT COMPLICATION, WITH LONG-TERM CURRENT USE OF INSULIN (HCC): Status: ACTIVE | Noted: 2021-02-15

## 2024-05-30 PROBLEM — E11.9 TYPE 2 DIABETES MELLITUS WITHOUT COMPLICATION, WITH LONG-TERM CURRENT USE OF INSULIN (HCC): Status: RESOLVED | Noted: 2021-02-15 | Resolved: 2024-05-30

## 2024-05-30 PROCEDURE — 3078F DIAST BP <80 MM HG: CPT | Performed by: STUDENT IN AN ORGANIZED HEALTH CARE EDUCATION/TRAINING PROGRAM

## 2024-05-30 PROCEDURE — 99203 OFFICE O/P NEW LOW 30 MIN: CPT | Performed by: STUDENT IN AN ORGANIZED HEALTH CARE EDUCATION/TRAINING PROGRAM

## 2024-05-30 PROCEDURE — 99386 PREV VISIT NEW AGE 40-64: CPT | Performed by: STUDENT IN AN ORGANIZED HEALTH CARE EDUCATION/TRAINING PROGRAM

## 2024-05-30 PROCEDURE — 3074F SYST BP LT 130 MM HG: CPT | Performed by: STUDENT IN AN ORGANIZED HEALTH CARE EDUCATION/TRAINING PROGRAM

## 2024-05-30 RX ORDER — SILDENAFIL CITRATE 100 MG
100 TABLET ORAL PRN
Qty: 12 TABLET | Refills: 0 | Status: SHIPPED | OUTPATIENT
Start: 2024-05-30

## 2024-05-30 RX ORDER — INSULIN GLARGINE 300 U/ML
38 INJECTION, SOLUTION SUBCUTANEOUS EVERY MORNING
Qty: 2 ADJUSTABLE DOSE PRE-FILLED PEN SYRINGE | Refills: 5 | Status: SHIPPED | OUTPATIENT
Start: 2024-05-30

## 2024-05-30 ASSESSMENT — ENCOUNTER SYMPTOMS
ABDOMINAL PAIN: 0
BLOOD IN STOOL: 0
WHEEZING: 0
SHORTNESS OF BREATH: 0

## 2024-05-30 NOTE — PROGRESS NOTES
light-headedness.         Vitals:    05/30/24 1322   BP: 104/66   Pulse: 84   SpO2: 94%   Weight: 98 kg (216 lb)   Height: 1.803 m (5' 11\")        Body mass index is 30.13 kg/m².     Wt Readings from Last 3 Encounters:   05/30/24 98 kg (216 lb)   05/20/24 98.2 kg (216 lb 6.4 oz)   04/24/24 98.9 kg (218 lb)     BP Readings from Last 3 Encounters:   05/30/24 104/66   05/20/24 120/84   04/24/24 124/82       Physical Exam  Vitals reviewed.   Constitutional:       Appearance: Normal appearance.   HENT:      Right Ear: Tympanic membrane normal.      Left Ear: Tympanic membrane normal.      Nose: Nose normal.      Mouth/Throat:      Mouth: Mucous membranes are moist.      Pharynx: Oropharynx is clear.   Eyes:      General:         Right eye: No discharge.         Left eye: No discharge.      Extraocular Movements: Extraocular movements intact.   Cardiovascular:      Rate and Rhythm: Normal rate and regular rhythm.      Pulses: Normal pulses.      Heart sounds: Normal heart sounds.   Pulmonary:      Effort: Pulmonary effort is normal.      Breath sounds: Normal breath sounds.   Abdominal:      General: Bowel sounds are normal. There is no distension.      Palpations: Abdomen is soft.      Tenderness: There is no abdominal tenderness.   Musculoskeletal:      Cervical back: Neck supple. No tenderness.      Right lower leg: No edema.      Left lower leg: No edema.   Lymphadenopathy:      Cervical: No cervical adenopathy.   Neurological:      General: No focal deficit present.      Mental Status: He is alert.      Cranial Nerves: No cranial nerve deficit.      Sensory: No sensory deficit.      Motor: No weakness.      Gait: Gait normal.      Deep Tendon Reflexes: Reflexes normal.          Lab Results   Component Value Date    CHOLFAST 182 11/22/2023    TRIGLYCFAST 151 (H) 11/22/2023    HDL 40 11/22/2023    LDLDIRECT 58 04/24/2024     Lab Results   Component Value Date    LABA1C 8.3 04/24/2024     Lab Results   Component Value

## 2024-05-30 NOTE — PATIENT INSTRUCTIONS
112% increased risk of diabetes.  - 147% increased risk of cardiovascular events like heart attack and stroke.  - 90% increased risk of death from cardiovascular events.  - 49% increased risk of death from any cause.    THE SCIENCE  Sitting can be so relaxing.  Why is it so bad?  Here’s what happens when you spend too much time sitting:    - Blood flow slows down.  This can allow fatty acids to build up in the blood vessels, leading to heart disease.  - Sitting for extended periods of time, regularly may lead to insulin resistance which can cause type 2 diabetes and obesity--2 major risk factors for heart disease.  - A 2018 study found that 82% of people who suffer from blood clots, sat for a significantly greater amount of time than the remaining 18%.  - Your body’s ability to process fats is slowed.  When you sit, your body’s production of lipoprotein lipase (an enzyme essential for breaking down fat) drops by about 90%.  When your body cannot break down fat, it is stored instead.    THE SOLUTION  Sitting is inevitable! Here’s how you can simeon off any negative side effects:    Set a timer. Get up every hour and move. Stand, walk around, stretch.  You can even download apps onto your phone to remind you!  2. Watch your posture.  Poor posture can lead to bone damage, decreased blood circulation, fatigue, and loss of muscle strength.  If you must sit, keep your shoulders back, your chin tucked, and your stomach pulled toward your spine in order to keep muscles engaged, bones aligned, and circulation flowing.  3. Take a stand. If you’re able, why not opt for a standing desk?  Not only will your heart thank you, but standing desks have been proven to increase brain function, creativity, and productivity.  4. Work it out. Commit to exercising every single day. Go on a walk during lunch.  Plan to attend a fitness class.  Choose the far parking spot.  Every minute of physical activity counts!      In recent years, it has

## 2024-06-22 DIAGNOSIS — N52.1 ERECTILE DYSFUNCTION DUE TO DISEASES CLASSIFIED ELSEWHERE: ICD-10-CM

## 2024-06-24 RX ORDER — SILDENAFIL 100 MG/1
100 TABLET, FILM COATED ORAL PRN
Qty: 10 TABLET | Refills: 0 | Status: SHIPPED | OUTPATIENT
Start: 2024-06-24

## 2024-06-24 NOTE — TELEPHONE ENCOUNTER
Recent Visits  Date Type Provider Dept   05/30/24 Office Visit Viji Flynn DO [facility]   10/17/23 Office Visit aVsyl Camp MD [facility]   06/08/23 Office Visit Vasyl Camp MD [facility]   Showing recent visits within past 540 days with a meds authorizing provider and meeting all other requirements  Future Appointments  No visits were found meeting these conditions.  Showing future appointments within next 150 days with a meds authorizing provider and meeting all other requirements     5/30/2024

## 2024-07-11 DIAGNOSIS — N52.1 ERECTILE DYSFUNCTION DUE TO DISEASES CLASSIFIED ELSEWHERE: ICD-10-CM

## 2024-07-11 RX ORDER — SILDENAFIL 100 MG/1
100 TABLET, FILM COATED ORAL PRN
Qty: 10 TABLET | Refills: 1 | Status: SHIPPED | OUTPATIENT
Start: 2024-07-11

## 2024-08-01 DIAGNOSIS — E11.9 TYPE 2 DIABETES MELLITUS WITHOUT COMPLICATION, WITHOUT LONG-TERM CURRENT USE OF INSULIN (HCC): ICD-10-CM

## 2024-08-01 DIAGNOSIS — E11.40 POORLY CONTROLLED TYPE 2 DIABETES MELLITUS WITH NEUROPATHY (HCC): ICD-10-CM

## 2024-08-01 DIAGNOSIS — E66.01 MORBID OBESITY DUE TO EXCESS CALORIES (HCC): ICD-10-CM

## 2024-08-01 DIAGNOSIS — E11.65 POORLY CONTROLLED TYPE 2 DIABETES MELLITUS WITH NEUROPATHY (HCC): ICD-10-CM

## 2024-08-01 DIAGNOSIS — E11.69 DYSLIPIDEMIA ASSOCIATED WITH TYPE 2 DIABETES MELLITUS (HCC): ICD-10-CM

## 2024-08-01 DIAGNOSIS — N52.1 ERECTILE DYSFUNCTION DUE TO DISEASES CLASSIFIED ELSEWHERE: ICD-10-CM

## 2024-08-01 DIAGNOSIS — I10 ESSENTIAL HYPERTENSION: ICD-10-CM

## 2024-08-01 DIAGNOSIS — E78.5 DYSLIPIDEMIA ASSOCIATED WITH TYPE 2 DIABETES MELLITUS (HCC): ICD-10-CM

## 2024-08-01 RX ORDER — SILDENAFIL 100 MG/1
100 TABLET, FILM COATED ORAL PRN
Qty: 10 TABLET | Refills: 1 | Status: SHIPPED | OUTPATIENT
Start: 2024-08-01

## 2024-08-01 RX ORDER — ATORVASTATIN CALCIUM 40 MG/1
40 TABLET, FILM COATED ORAL DAILY
Qty: 90 TABLET | Refills: 0 | Status: SHIPPED | OUTPATIENT
Start: 2024-08-01

## 2024-08-01 RX ORDER — INSULIN GLARGINE 300 U/ML
38 INJECTION, SOLUTION SUBCUTANEOUS EVERY MORNING
Qty: 4 ADJUSTABLE DOSE PRE-FILLED PEN SYRINGE | Refills: 0 | Status: SHIPPED | OUTPATIENT
Start: 2024-08-01

## 2024-08-01 NOTE — TELEPHONE ENCOUNTER
insulin glargine, 1 unit dial, (TOUJEO SOLOSTAR) 300 UNIT/ML concentrated injection pen     *Hasn't had his insulin for 3 days*    sildenafil (VIAGRA) 100 MG tablet     atorvastatin (LIPITOR) 40 MG tablet     metFORMIN (GLUCOPHAGE) 1000 MG tablet     90 day supply please      Thank you    Send to Alicia

## 2024-08-02 RX ORDER — ATORVASTATIN CALCIUM 40 MG/1
40 TABLET, FILM COATED ORAL DAILY
Qty: 90 TABLET | Refills: 0 | OUTPATIENT
Start: 2024-08-02

## 2024-08-19 ENCOUNTER — TELEPHONE (OUTPATIENT)
Dept: ENDOCRINOLOGY | Age: 54
End: 2024-08-19

## 2024-08-19 DIAGNOSIS — I10 ESSENTIAL HYPERTENSION: ICD-10-CM

## 2024-08-19 DIAGNOSIS — E66.01 MORBID OBESITY DUE TO EXCESS CALORIES (HCC): ICD-10-CM

## 2024-08-19 DIAGNOSIS — E11.69 DYSLIPIDEMIA ASSOCIATED WITH TYPE 2 DIABETES MELLITUS (HCC): ICD-10-CM

## 2024-08-19 DIAGNOSIS — E78.5 DYSLIPIDEMIA ASSOCIATED WITH TYPE 2 DIABETES MELLITUS (HCC): ICD-10-CM

## 2024-08-19 DIAGNOSIS — E11.9 TYPE 2 DIABETES MELLITUS WITHOUT COMPLICATION, WITHOUT LONG-TERM CURRENT USE OF INSULIN (HCC): ICD-10-CM

## 2024-08-19 DIAGNOSIS — E11.40 POORLY CONTROLLED TYPE 2 DIABETES MELLITUS WITH NEUROPATHY (HCC): ICD-10-CM

## 2024-08-19 DIAGNOSIS — N52.1 ERECTILE DYSFUNCTION DUE TO DISEASES CLASSIFIED ELSEWHERE: ICD-10-CM

## 2024-08-19 DIAGNOSIS — E11.65 POORLY CONTROLLED TYPE 2 DIABETES MELLITUS WITH NEUROPATHY (HCC): ICD-10-CM

## 2024-08-19 RX ORDER — SILDENAFIL 100 MG/1
100 TABLET, FILM COATED ORAL PRN
Qty: 10 TABLET | Refills: 1 | Status: SHIPPED | OUTPATIENT
Start: 2024-08-19

## 2024-08-19 RX ORDER — ATORVASTATIN CALCIUM 40 MG/1
40 TABLET, FILM COATED ORAL DAILY
Qty: 90 TABLET | Refills: 0 | Status: SHIPPED | OUTPATIENT
Start: 2024-08-19

## 2024-08-19 RX ORDER — INSULIN GLARGINE 300 U/ML
38 INJECTION, SOLUTION SUBCUTANEOUS EVERY MORNING
Qty: 4 ADJUSTABLE DOSE PRE-FILLED PEN SYRINGE | Refills: 0 | Status: SHIPPED | OUTPATIENT
Start: 2024-08-19

## 2024-08-19 NOTE — TELEPHONE ENCOUNTER
Patient called to check on status of previous refill message.  The 90 day scripts were sent to the wrong pharmacy.  [facility] will only cover 30 day supplies of medication. Patient requested the 90 day scripts go to [facility].  90 day scripts re-pended.

## 2024-08-28 DIAGNOSIS — E66.01 MORBID OBESITY DUE TO EXCESS CALORIES (HCC): ICD-10-CM

## 2024-08-28 NOTE — TELEPHONE ENCOUNTER
Pharmacy sent over fax stating patient's insurance requires a 90 day supply. Please confirm the prescription I updated below.

## 2024-08-29 RX ORDER — INSULIN GLARGINE 300 U/ML
38 INJECTION, SOLUTION SUBCUTANEOUS EVERY MORNING
Qty: 12 ADJUSTABLE DOSE PRE-FILLED PEN SYRINGE | Refills: 0 | Status: SHIPPED | OUTPATIENT
Start: 2024-08-29

## 2024-10-15 ENCOUNTER — OFFICE VISIT (OUTPATIENT)
Dept: INTERNAL MEDICINE CLINIC | Age: 54
End: 2024-10-15

## 2024-10-15 VITALS
OXYGEN SATURATION: 71 % | WEIGHT: 220 LBS | HEART RATE: 69 BPM | BODY MASS INDEX: 30.68 KG/M2 | SYSTOLIC BLOOD PRESSURE: 106 MMHG | DIASTOLIC BLOOD PRESSURE: 76 MMHG

## 2024-10-15 DIAGNOSIS — E11.65 POORLY CONTROLLED TYPE 2 DIABETES MELLITUS WITH NEUROPATHY (HCC): Primary | ICD-10-CM

## 2024-10-15 DIAGNOSIS — E11.40 POORLY CONTROLLED TYPE 2 DIABETES MELLITUS WITH NEUROPATHY (HCC): Primary | ICD-10-CM

## 2024-10-15 DIAGNOSIS — E66.01 MORBID OBESITY DUE TO EXCESS CALORIES: ICD-10-CM

## 2024-10-15 LAB — HBA1C MFR BLD: 9.4 %

## 2024-10-15 RX ORDER — INSULIN GLARGINE 300 U/ML
42 INJECTION, SOLUTION SUBCUTANEOUS EVERY MORNING
Qty: 12 ADJUSTABLE DOSE PRE-FILLED PEN SYRINGE | Refills: 0 | Status: SHIPPED | OUTPATIENT
Start: 2024-10-15

## 2024-10-15 ASSESSMENT — ENCOUNTER SYMPTOMS
SHORTNESS OF BREATH: 0
VOMITING: 0
DIARRHEA: 0
NAUSEA: 0
WHEEZING: 0

## 2024-10-15 NOTE — PATIENT INSTRUCTIONS
risk of diabetes.  - 147% increased risk of cardiovascular events like heart attack and stroke.  - 90% increased risk of death from cardiovascular events.  - 49% increased risk of death from any cause.    THE SCIENCE  Sitting can be so relaxing.  Why is it so bad?  Here’s what happens when you spend too much time sitting:    - Blood flow slows down.  This can allow fatty acids to build up in the blood vessels, leading to heart disease.  - Sitting for extended periods of time, regularly may lead to insulin resistance which can cause type 2 diabetes and obesity--2 major risk factors for heart disease.  - A 2018 study found that 82% of people who suffer from blood clots, sat for a significantly greater amount of time than the remaining 18%.  - Your body’s ability to process fats is slowed.  When you sit, your body’s production of lipoprotein lipase (an enzyme essential for breaking down fat) drops by about 90%.  When your body cannot break down fat, it is stored instead.    THE SOLUTION  Sitting is inevitable! Here’s how you can simeon off any negative side effects:    Set a timer. Get up every hour and move. Stand, walk around, stretch.  You can even download apps onto your phone to remind you!  2. Watch your posture.  Poor posture can lead to bone damage, decreased blood circulation, fatigue, and loss of muscle strength.  If you must sit, keep your shoulders back, your chin tucked, and your stomach pulled toward your spine in order to keep muscles engaged, bones aligned, and circulation flowing.  3. Take a stand. If you’re able, why not opt for a standing desk?  Not only will your heart thank you, but standing desks have been proven to increase brain function, creativity, and productivity.  4. Work it out. Commit to exercising every single day. Go on a walk during lunch.  Plan to attend a fitness class.  Choose the far parking spot.  Every minute of physical activity counts!      In recent years, it has been said that

## 2024-12-03 ENCOUNTER — OFFICE VISIT (OUTPATIENT)
Dept: INTERNAL MEDICINE CLINIC | Age: 54
End: 2024-12-03

## 2024-12-03 VITALS
HEART RATE: 70 BPM | DIASTOLIC BLOOD PRESSURE: 78 MMHG | SYSTOLIC BLOOD PRESSURE: 118 MMHG | BODY MASS INDEX: 30.77 KG/M2 | OXYGEN SATURATION: 97 % | WEIGHT: 220.6 LBS

## 2024-12-03 DIAGNOSIS — E11.40 POORLY CONTROLLED TYPE 2 DIABETES MELLITUS WITH NEUROPATHY (HCC): ICD-10-CM

## 2024-12-03 DIAGNOSIS — J06.9 URI, ACUTE: Primary | ICD-10-CM

## 2024-12-03 DIAGNOSIS — E11.65 POORLY CONTROLLED TYPE 2 DIABETES MELLITUS WITH NEUROPATHY (HCC): ICD-10-CM

## 2024-12-03 RX ORDER — GUAIFENESIN 600 MG/1
600 TABLET, EXTENDED RELEASE ORAL 2 TIMES DAILY
Qty: 14 TABLET | Refills: 0 | Status: SHIPPED | OUTPATIENT
Start: 2024-12-03 | End: 2024-12-10

## 2024-12-03 RX ORDER — INSULIN GLARGINE 300 U/ML
42 INJECTION, SOLUTION SUBCUTANEOUS EVERY MORNING
Qty: 9 ADJUSTABLE DOSE PRE-FILLED PEN SYRINGE | Refills: 1 | Status: SHIPPED | OUTPATIENT
Start: 2024-12-03 | End: 2024-12-03

## 2024-12-03 RX ORDER — INSULIN GLARGINE 300 U/ML
45 INJECTION, SOLUTION SUBCUTANEOUS EVERY MORNING
Qty: 9 ADJUSTABLE DOSE PRE-FILLED PEN SYRINGE | Refills: 1 | Status: SHIPPED | OUTPATIENT
Start: 2024-12-03

## 2024-12-03 SDOH — ECONOMIC STABILITY: FOOD INSECURITY: WITHIN THE PAST 12 MONTHS, YOU WORRIED THAT YOUR FOOD WOULD RUN OUT BEFORE YOU GOT MONEY TO BUY MORE.: NEVER TRUE

## 2024-12-03 SDOH — ECONOMIC STABILITY: FOOD INSECURITY: WITHIN THE PAST 12 MONTHS, THE FOOD YOU BOUGHT JUST DIDN'T LAST AND YOU DIDN'T HAVE MONEY TO GET MORE.: NEVER TRUE

## 2024-12-03 SDOH — ECONOMIC STABILITY: INCOME INSECURITY: HOW HARD IS IT FOR YOU TO PAY FOR THE VERY BASICS LIKE FOOD, HOUSING, MEDICAL CARE, AND HEATING?: NOT HARD AT ALL

## 2024-12-03 ASSESSMENT — ANXIETY QUESTIONNAIRES
4. TROUBLE RELAXING: NOT AT ALL
6. BECOMING EASILY ANNOYED OR IRRITABLE: NOT AT ALL
1. FEELING NERVOUS, ANXIOUS, OR ON EDGE: NOT AT ALL
2. NOT BEING ABLE TO STOP OR CONTROL WORRYING: NOT AT ALL
3. WORRYING TOO MUCH ABOUT DIFFERENT THINGS: NOT AT ALL
5. BEING SO RESTLESS THAT IT IS HARD TO SIT STILL: NOT AT ALL
IF YOU CHECKED OFF ANY PROBLEMS ON THIS QUESTIONNAIRE, HOW DIFFICULT HAVE THESE PROBLEMS MADE IT FOR YOU TO DO YOUR WORK, TAKE CARE OF THINGS AT HOME, OR GET ALONG WITH OTHER PEOPLE: NOT DIFFICULT AT ALL
GAD7 TOTAL SCORE: 0
7. FEELING AFRAID AS IF SOMETHING AWFUL MIGHT HAPPEN: NOT AT ALL

## 2024-12-03 ASSESSMENT — PATIENT HEALTH QUESTIONNAIRE - PHQ9
SUM OF ALL RESPONSES TO PHQ QUESTIONS 1-9: 0
2. FEELING DOWN, DEPRESSED OR HOPELESS: NOT AT ALL
SUM OF ALL RESPONSES TO PHQ9 QUESTIONS 1 & 2: 0
1. LITTLE INTEREST OR PLEASURE IN DOING THINGS: NOT AT ALL
SUM OF ALL RESPONSES TO PHQ QUESTIONS 1-9: 0

## 2024-12-03 NOTE — PROGRESS NOTES
(Medical): Not on file     Lack of Transportation (Non-Medical): No   Physical Activity: Unknown (2024)    Exercise Vital Sign     Days of Exercise per Week: 2 days     Minutes of Exercise per Session: Not on file   Stress: Not on file   Social Connections: Not on file   Intimate Partner Violence: Not on file   Housing Stability: Unknown (12/3/2024)    Housing Stability Vital Sign     Unable to Pay for Housing in the Last Year: Not on file     Number of Times Moved in the Last Year: Not on file     Homeless in the Last Year: No     History reviewed. No pertinent surgical history.   Family History   Problem Relation Age of Onset    Diabetes Mother 38         of DKA    Diabetes Sister     Diabetes Brother     Diabetes Brother       Allergies   Allergen Reactions    Food      shrimp    Trulicity [Dulaglutide] Other (See Comments)     GI symptoms         Objective   Vitals:    24 1209   BP: 118/78   Site: Right Upper Arm   Position: Sitting   Cuff Size: Large Adult   Pulse: 70   SpO2: 97%   Weight: 100.1 kg (220 lb 9.6 oz)     Physical Exam  Vitals reviewed.   Constitutional:       Appearance: Normal appearance.   Cardiovascular:      Rate and Rhythm: Normal rate and regular rhythm.      Pulses: Normal pulses.      Heart sounds: Normal heart sounds.   Pulmonary:      Effort: Pulmonary effort is normal.      Breath sounds: Normal breath sounds.   Abdominal:      General: Bowel sounds are normal.      Palpations: Abdomen is soft.   Musculoskeletal:      Right lower leg: No edema.      Left lower leg: No edema.   Neurological:      Mental Status: He is alert.            An electronic signature was used to authenticate this note.    --Viji Flynn DO   Total time spent for this encounter: Not billed by time    Documentation was done using voice recognition dragon software.  Every effort was made to ensure accuracy; however, inadvertent, unintentional computerized transcription errors may be present.  .

## 2024-12-03 NOTE — PATIENT INSTRUCTIONS
[place] [employer]   Speak to a trained professional 24/7 who can connect you to essential community services including food, clothing, transportation, housing, utilities, employment services, childcare, and baby supplies. 211 serves nationwide.  [facility].Shoprocket for resources in [place], [facility], [place] and [facility] in [place]; [place], [place], [place], and [facility] in [place].   [employer] [facility] [facility].org/resources for resources in [place], [place], [place], [place], [place], [place], [place], [place], [facility], [place], [place], and [facility] in [place].    Fe[employer]   What they offer: [employer] is a nationwide network of food cunningham, food pantries, and meal programs.  Website: https://www.feedingamerica.org/find-your-local-foodBanner Ironwood Medical Center      National Hunger Hotline  What they offer: The hotline is a resource for individuals and families seeking information on how and where to obtain food.   Website:  https://www.hungerfreeamerica.org/en-us/[facility]-national-hunger-hotline    Hunger Hotline Phone Number: 7-022-5-HUNGGILBERT (1-872.435.5175)  Hours of Operation: Monday - Friday 7am to 10pm   The Hunger Hotline also operates a texting service. Our number is 941-307-0977. Please note that these are automated texts and we do not reply or monitor texts.   [facility]  What they offer: $1 for $1 matching for families receiving SNAP/food stamps when spent on “healthy” food.  The match money can be used to purchase fruits and vegetables so adds more healthy food choices for SNAP beneficiaries.   Contact: https://[employer]/locations/     SNAP (formerly Food [place])   What they offer: SNAP is used like cash to buy eligible food items from authorized retailers.  Apply for benefits online: https://jfs.[place].gov/ofam/foodstamps.stm     Apply for benefits by phone or in-person by visiting your local Job and Family Services. Locate your county’s Job and family services by searching the directory at

## 2025-01-20 DIAGNOSIS — I10 ESSENTIAL HYPERTENSION: ICD-10-CM

## 2025-01-20 DIAGNOSIS — E66.01 MORBID OBESITY DUE TO EXCESS CALORIES: ICD-10-CM

## 2025-01-20 DIAGNOSIS — E11.40 POORLY CONTROLLED TYPE 2 DIABETES MELLITUS WITH NEUROPATHY (HCC): ICD-10-CM

## 2025-01-20 DIAGNOSIS — E78.5 DYSLIPIDEMIA ASSOCIATED WITH TYPE 2 DIABETES MELLITUS (HCC): ICD-10-CM

## 2025-01-20 DIAGNOSIS — E11.9 TYPE 2 DIABETES MELLITUS WITHOUT COMPLICATION, WITHOUT LONG-TERM CURRENT USE OF INSULIN (HCC): ICD-10-CM

## 2025-01-20 DIAGNOSIS — E11.65 POORLY CONTROLLED TYPE 2 DIABETES MELLITUS WITH NEUROPATHY (HCC): ICD-10-CM

## 2025-01-20 DIAGNOSIS — E11.69 DYSLIPIDEMIA ASSOCIATED WITH TYPE 2 DIABETES MELLITUS (HCC): ICD-10-CM

## 2025-01-20 RX ORDER — ATORVASTATIN CALCIUM 40 MG/1
40 TABLET, FILM COATED ORAL DAILY
Qty: 90 TABLET | Refills: 1 | Status: SHIPPED | OUTPATIENT
Start: 2025-01-20

## 2025-02-19 ENCOUNTER — HOSPITAL ENCOUNTER (EMERGENCY)
Age: 55
Discharge: HOME | End: 2025-02-19
Payer: COMMERCIAL

## 2025-02-19 ENCOUNTER — APPOINTMENT (OUTPATIENT)
Dept: GENERAL RADIOLOGY | Age: 55
End: 2025-02-19
Payer: COMMERCIAL

## 2025-02-19 VITALS
HEART RATE: 73 BPM | RESPIRATION RATE: 16 BRPM | DIASTOLIC BLOOD PRESSURE: 95 MMHG | HEIGHT: 71 IN | TEMPERATURE: 98 F | BODY MASS INDEX: 30.06 KG/M2 | OXYGEN SATURATION: 93 % | SYSTOLIC BLOOD PRESSURE: 130 MMHG | WEIGHT: 214.7 LBS

## 2025-02-19 DIAGNOSIS — R07.9 CHEST PAIN, UNSPECIFIED TYPE: Primary | ICD-10-CM

## 2025-02-19 DIAGNOSIS — M25.552 LEFT HIP PAIN: ICD-10-CM

## 2025-02-19 LAB
ALBUMIN SERPL-MCNC: 4.3 G/DL (ref 3.4–5)
ALBUMIN/GLOB SERPL: 1.3 {RATIO} (ref 1.1–2.2)
ALP SERPL-CCNC: 45 U/L (ref 40–129)
ALT SERPL-CCNC: 30 U/L (ref 10–40)
ANION GAP SERPL CALCULATED.3IONS-SCNC: 11 MMOL/L (ref 3–16)
AST SERPL-CCNC: 22 U/L (ref 15–37)
BASOPHILS # BLD: 0.1 K/UL (ref 0–0.2)
BASOPHILS NFR BLD: 0.8 %
BILIRUB SERPL-MCNC: <0.2 MG/DL (ref 0–1)
BUN SERPL-MCNC: 12 MG/DL (ref 7–20)
CALCIUM SERPL-MCNC: 9.7 MG/DL (ref 8.3–10.6)
CHLORIDE SERPL-SCNC: 104 MMOL/L (ref 99–110)
CO2 SERPL-SCNC: 24 MMOL/L (ref 21–32)
CREAT SERPL-MCNC: 0.8 MG/DL (ref 0.9–1.3)
DEPRECATED RDW RBC AUTO: 13.8 % (ref 12.4–15.4)
EOSINOPHIL # BLD: 0.1 K/UL (ref 0–0.6)
EOSINOPHIL NFR BLD: 1 %
GFR SERPLBLD CREATININE-BSD FMLA CKD-EPI: >90 ML/MIN/{1.73_M2}
GLUCOSE SERPL-MCNC: 134 MG/DL (ref 70–99)
HCT VFR BLD AUTO: 40.2 % (ref 40.5–52.5)
HGB BLD-MCNC: 13.6 G/DL (ref 13.5–17.5)
LYMPHOCYTES # BLD: 2.3 K/UL (ref 1–5.1)
LYMPHOCYTES NFR BLD: 34.1 %
MCH RBC QN AUTO: 28.6 PG (ref 26–34)
MCHC RBC AUTO-ENTMCNC: 33.9 G/DL (ref 31–36)
MCV RBC AUTO: 84.2 FL (ref 80–100)
MONOCYTES # BLD: 0.5 K/UL (ref 0–1.3)
MONOCYTES NFR BLD: 8.1 %
NEUTROPHILS # BLD: 3.8 K/UL (ref 1.7–7.7)
NEUTROPHILS NFR BLD: 56 %
NT-PROBNP SERPL-MCNC: <36 PG/ML (ref 0–124)
PLATELET # BLD AUTO: 294 K/UL (ref 135–450)
PMV BLD AUTO: 8.1 FL (ref 5–10.5)
POTASSIUM SERPL-SCNC: 4.1 MMOL/L (ref 3.5–5.1)
PROT SERPL-MCNC: 7.5 G/DL (ref 6.4–8.2)
RBC # BLD AUTO: 4.78 M/UL (ref 4.2–5.9)
SODIUM SERPL-SCNC: 139 MMOL/L (ref 136–145)
TROPONIN, HIGH SENSITIVITY: 7 NG/L (ref 0–22)
TROPONIN, HIGH SENSITIVITY: 8 NG/L (ref 0–22)
WBC # BLD AUTO: 6.7 K/UL (ref 4–11)

## 2025-02-19 PROCEDURE — 80053 COMPREHEN METABOLIC PANEL: CPT

## 2025-02-19 PROCEDURE — 71045 X-RAY EXAM CHEST 1 VIEW: CPT

## 2025-02-19 PROCEDURE — 83880 ASSAY OF NATRIURETIC PEPTIDE: CPT

## 2025-02-19 PROCEDURE — 99285 EMERGENCY DEPT VISIT HI MDM: CPT

## 2025-02-19 PROCEDURE — 93005 ELECTROCARDIOGRAM TRACING: CPT | Performed by: EMERGENCY MEDICINE

## 2025-02-19 PROCEDURE — 6370000000 HC RX 637 (ALT 250 FOR IP): Performed by: NURSE PRACTITIONER

## 2025-02-19 PROCEDURE — 84484 ASSAY OF TROPONIN QUANT: CPT

## 2025-02-19 PROCEDURE — 85025 COMPLETE CBC W/AUTO DIFF WBC: CPT

## 2025-02-19 RX ORDER — ASPIRIN 81 MG/1
324 TABLET, CHEWABLE ORAL ONCE
Status: COMPLETED | OUTPATIENT
Start: 2025-02-19 | End: 2025-02-19

## 2025-02-19 RX ADMIN — ASPIRIN 324 MG: 81 TABLET, CHEWABLE ORAL at 20:58

## 2025-02-19 ASSESSMENT — PAIN - FUNCTIONAL ASSESSMENT: PAIN_FUNCTIONAL_ASSESSMENT: 0-10

## 2025-02-19 ASSESSMENT — HEART SCORE: ECG: NORMAL

## 2025-02-19 ASSESSMENT — PAIN DESCRIPTION - PAIN TYPE: TYPE: ACUTE PAIN

## 2025-02-19 ASSESSMENT — PAIN DESCRIPTION - LOCATION: LOCATION: CHEST

## 2025-02-19 ASSESSMENT — PAIN SCALES - GENERAL: PAINLEVEL_OUTOF10: 4

## 2025-02-20 LAB
EKG ATRIAL RATE: 72 BPM
EKG DIAGNOSIS: NORMAL
EKG P AXIS: 64 DEGREES
EKG P-R INTERVAL: 180 MS
EKG Q-T INTERVAL: 380 MS
EKG QRS DURATION: 88 MS
EKG QTC CALCULATION (BAZETT): 416 MS
EKG R AXIS: 12 DEGREES
EKG T AXIS: 14 DEGREES
EKG VENTRICULAR RATE: 72 BPM

## 2025-02-20 PROCEDURE — 93010 ELECTROCARDIOGRAM REPORT: CPT | Performed by: INTERNAL MEDICINE

## 2025-02-20 ASSESSMENT — ENCOUNTER SYMPTOMS
ABDOMINAL PAIN: 0
VOMITING: 0
CHEST TIGHTNESS: 0
SHORTNESS OF BREATH: 0
NAUSEA: 0
DIARRHEA: 0

## 2025-02-20 NOTE — ED PROVIDER NOTES
congestion or pulmonary artery hypertension which  is more prominent with no acute infiltrate or effusion              MEDICAL DECISION MAKING:   I considered, but did not perform, additional testing such CT Angiogram, as well as admission or transfer to a higher level of care.     I utilized an evidence-based risk rating tool (CMT) along with my training and experience to weigh the risk of discharge against the risks of further testing, imaging, or hospitalization. At this time, I estimate the risks of additional testing, imaging, or hospitalization to be equal to or greater than the risk of discharge(in the case of discharge home).      The patient's HEART Score is 3. In rare cases, I give patients with HEART Score of 4 the option of discharge, but only when they meet criteria for \"Low 4,\" meaning that HST was used, and the 4 is not from a highly suspicious story, highly suspicious EKG, or positive cardiac enzymes.  In these selected cases, the risk of a \"Low 4\" is still most likely lower than the risk of admission and further testing/imaging. [id number]    SHARED DECISION MAKING:   I discussed my risk assessment with the patient. The patient understands and consents to the risk of disposition/plan, as well as the risk of uncertainty in estimating outcomes. [id number]            Disposition Considerations (tests considered but not done, Admit vs D/C, Shared Decision Making, Pt Expectation of Test or Tx.): Shared decision making: Initial differential diagnoses were discussed with this patient, along with physical exam findings and an explanation what evaluation studies were necessary and why. Labs and Imaging results were explained to the patient in detail, including explanation of what these results mean. All treatment and disposition options were discussed with the patient and a treatment plan with the patient's best short and long term care was made in collaboration with the patient.    I did

## 2025-03-13 ENCOUNTER — TELEPHONE (OUTPATIENT)
Dept: INTERNAL MEDICINE CLINIC | Age: 55
End: 2025-03-13

## 2025-03-13 DIAGNOSIS — E11.65 POORLY CONTROLLED TYPE 2 DIABETES MELLITUS WITH NEUROPATHY (HCC): ICD-10-CM

## 2025-03-13 DIAGNOSIS — E11.40 POORLY CONTROLLED TYPE 2 DIABETES MELLITUS WITH NEUROPATHY (HCC): ICD-10-CM

## 2025-03-13 NOTE — TELEPHONE ENCOUNTER
Patient dropped off FMLA forms. Blank form has been scanned into chart. Form was then placed in a yellow sleeve and placed in office mail bin to be completed. Please call patient when completed.

## 2025-03-18 ENCOUNTER — TELEMEDICINE (OUTPATIENT)
Dept: INTERNAL MEDICINE CLINIC | Age: 55
End: 2025-03-18
Payer: COMMERCIAL

## 2025-03-18 DIAGNOSIS — E11.65 TYPE 2 DIABETES MELLITUS WITH HYPERGLYCEMIA, WITH LONG-TERM CURRENT USE OF INSULIN (HCC): ICD-10-CM

## 2025-03-18 DIAGNOSIS — M51.369 DEGENERATION OF INTERVERTEBRAL DISC OF LUMBAR REGION, UNSPECIFIED WHETHER PAIN PRESENT: Primary | ICD-10-CM

## 2025-03-18 DIAGNOSIS — Z79.4 TYPE 2 DIABETES MELLITUS WITH HYPERGLYCEMIA, WITH LONG-TERM CURRENT USE OF INSULIN (HCC): ICD-10-CM

## 2025-03-18 PROCEDURE — 99213 OFFICE O/P EST LOW 20 MIN: CPT | Performed by: STUDENT IN AN ORGANIZED HEALTH CARE EDUCATION/TRAINING PROGRAM

## 2025-03-18 RX ORDER — INSULIN GLARGINE 300 U/ML
45 INJECTION, SOLUTION SUBCUTANEOUS EVERY MORNING
Qty: 9 ADJUSTABLE DOSE PRE-FILLED PEN SYRINGE | Refills: 1 | Status: SHIPPED | OUTPATIENT
Start: 2025-03-18 | End: 2025-03-20

## 2025-03-18 SDOH — ECONOMIC STABILITY: FOOD INSECURITY: WITHIN THE PAST 12 MONTHS, YOU WORRIED THAT YOUR FOOD WOULD RUN OUT BEFORE YOU GOT MONEY TO BUY MORE.: NEVER TRUE

## 2025-03-18 SDOH — ECONOMIC STABILITY: FOOD INSECURITY: WITHIN THE PAST 12 MONTHS, THE FOOD YOU BOUGHT JUST DIDN'T LAST AND YOU DIDN'T HAVE MONEY TO GET MORE.: NEVER TRUE

## 2025-03-18 ASSESSMENT — PATIENT HEALTH QUESTIONNAIRE - PHQ9
SUM OF ALL RESPONSES TO PHQ QUESTIONS 1-9: 0
2. FEELING DOWN, DEPRESSED OR HOPELESS: NOT AT ALL
SUM OF ALL RESPONSES TO PHQ QUESTIONS 1-9: 0
1. LITTLE INTEREST OR PLEASURE IN DOING THINGS: NOT AT ALL

## 2025-03-18 ASSESSMENT — ANXIETY QUESTIONNAIRES
2. NOT BEING ABLE TO STOP OR CONTROL WORRYING: NOT AT ALL
1. FEELING NERVOUS, ANXIOUS, OR ON EDGE: NOT AT ALL
GAD7 TOTAL SCORE: 0
4. TROUBLE RELAXING: NOT AT ALL
IF YOU CHECKED OFF ANY PROBLEMS ON THIS QUESTIONNAIRE, HOW DIFFICULT HAVE THESE PROBLEMS MADE IT FOR YOU TO DO YOUR WORK, TAKE CARE OF THINGS AT HOME, OR GET ALONG WITH OTHER PEOPLE: NOT DIFFICULT AT ALL
5. BEING SO RESTLESS THAT IT IS HARD TO SIT STILL: NOT AT ALL
6. BECOMING EASILY ANNOYED OR IRRITABLE: NOT AT ALL
3. WORRYING TOO MUCH ABOUT DIFFERENT THINGS: NOT AT ALL
7. FEELING AFRAID AS IF SOMETHING AWFUL MIGHT HAPPEN: NOT AT ALL

## 2025-03-18 NOTE — PROGRESS NOTES
Seth Villavicencio, was evaluated through a synchronous (real-time) audio-video encounter. The patient (or guardian if applicable) is aware that this is a billable service, which includes applicable co-pays. This Virtual Visit was conducted with patient's (and/or legal guardian's) consent. Patient identification was verified, and a caregiver was present when appropriate.   The patient was located at Home: [address] [place] [address]  Provider was located at Facility (Appt Dept): [address] [place] [address]  Confirm you are appropriately licensed, registered, or certified to deliver care in the state where the patient is located as indicated above. If you are not or unsure, please re-schedule the visit: Yes, I confirm.     Seth Villavicencio (:  1970) is a Established patient, presenting virtually for evaluation of the following:      Below is the assessment and plan developed based on review of pertinent history, physical exam, labs, studies, and medications.     Assessment & Plan  Degeneration of intervertebral disc of lumbar region, unspecified whether pain present    - FMLA paper work to be completed  - Rest prn. Tylenol and NSAIDs prn  - Emphasized improtance of PT, home stretches and exercises   Orders:    Non BS - External Referral To Physical Therapy    Type 2 diabetes mellitus with hyperglycemia, with long-term current use of insulin ([facility])    - Labs for future visit     Orders:    Hemoglobin A1C; Future    Lipid, Fasting; Future    Albumin/Creatinine Ratio, Urine; Future      Return if symptoms worsen or fail to improve.       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

## 2025-03-20 ENCOUNTER — TELEPHONE (OUTPATIENT)
Dept: INTERNAL MEDICINE CLINIC | Age: 55
End: 2025-03-20

## 2025-03-20 DIAGNOSIS — E11.65 POORLY CONTROLLED TYPE 2 DIABETES MELLITUS WITH NEUROPATHY (HCC): ICD-10-CM

## 2025-03-20 DIAGNOSIS — E11.40 POORLY CONTROLLED TYPE 2 DIABETES MELLITUS WITH NEUROPATHY (HCC): ICD-10-CM

## 2025-03-20 RX ORDER — INSULIN GLARGINE 300 U/ML
42 INJECTION, SOLUTION SUBCUTANEOUS
Qty: 18 ML | Refills: 0 | Status: SHIPPED | OUTPATIENT
Start: 2025-03-20

## 2025-03-20 RX ORDER — INSULIN GLARGINE 300 U/ML
INJECTION, SOLUTION SUBCUTANEOUS
Qty: 18 ML | Refills: 1 | Status: SHIPPED | OUTPATIENT
Start: 2025-03-20 | End: 2025-03-20 | Stop reason: SDUPTHER

## 2025-03-20 RX ORDER — INSULIN GLARGINE 300 U/ML
42 INJECTION, SOLUTION SUBCUTANEOUS
Qty: 18 ML | Refills: 1 | Status: SHIPPED | OUTPATIENT
Start: 2025-03-20

## 2025-03-20 NOTE — TELEPHONE ENCOUNTER
One pen/3 mL should cover approx 21 days for pt - Six pens/18 mL covers 120 days. [facility]"" mail order for >180 days.

## 2025-03-20 NOTE — TELEPHONE ENCOUNTER
Per stephanie, confirmed receipt of the script being received by [facility]. She sent the updated 90 day supply this afternoon   Stage 15: Additional Anesthesia Type: 1% lidocaine with epinephrine

## 2025-03-20 NOTE — TELEPHONE ENCOUNTER
Spoke with [facility] who can provide 1 pen (300 u/21 days) for patient for $6. They have pen avail for pt to .    Sent order for 90 day supply to [facility] mail order which should arrive to pt in approx 10 days.    Spoke with pt to explain these details - he verbalized understanding and agreement and used teach-back to verbalize his action steps ( 1 pen at [facility] and speak with [facility] on the phone).

## 2025-03-20 NOTE — TELEPHONE ENCOUNTER
Patient has been w/o insulin for 4 days. He is asking for the 90 supply of his Toujeo to be approved as soon as possible.  His insurance company will not cover the   30 day supply

## 2025-03-20 NOTE — TELEPHONE ENCOUNTER
insulin glargine, 1 unit dial, (TOUJEO SOLOSTAR) 300 UNIT/ML concentrated injection pen     Needs 90 day supply, ins will only cover 90 days supply

## 2025-03-31 DIAGNOSIS — Z79.4 TYPE 2 DIABETES MELLITUS WITH HYPERGLYCEMIA, WITH LONG-TERM CURRENT USE OF INSULIN (HCC): ICD-10-CM

## 2025-03-31 DIAGNOSIS — E11.65 TYPE 2 DIABETES MELLITUS WITH HYPERGLYCEMIA, WITH LONG-TERM CURRENT USE OF INSULIN (HCC): ICD-10-CM

## 2025-03-31 LAB
CHOLEST SERPL-MCNC: 144 MG/DL (ref 0–199)
CREAT UR-MCNC: 175 MG/DL (ref 39–259)
HDLC SERPL-MCNC: 44 MG/DL (ref 40–60)
LDL CHOLESTEROL: 67 MG/DL
MICROALBUMIN UR DL<=1MG/L-MCNC: 5.67 MG/DL
MICROALBUMIN/CREAT UR: 32.4 MG/G (ref 0–30)
TRIGL SERPL-MCNC: 166 MG/DL (ref 0–150)
VLDLC SERPL CALC-MCNC: 33 MG/DL

## 2025-04-01 LAB
EST. AVERAGE GLUCOSE BLD GHB EST-MCNC: 182.9 MG/DL
HBA1C MFR BLD: 8 %

## 2025-04-04 DIAGNOSIS — N52.1 ERECTILE DYSFUNCTION DUE TO DISEASES CLASSIFIED ELSEWHERE: ICD-10-CM

## 2025-04-04 RX ORDER — SILDENAFIL 100 MG/1
100 TABLET, FILM COATED ORAL PRN
Qty: 10 TABLET | Refills: 1 | Status: SHIPPED | OUTPATIENT
Start: 2025-04-04

## 2025-04-04 NOTE — TELEPHONE ENCOUNTER
Recent Visits  Date Type Provider Dept   12/03/24 Office Visit Viji Flynn, DO [facility]   10/15/24 Office Visit Viji Flynn, DO [facility]   05/30/24 Office Visit Viji Flynn, DO [facility]   10/17/23 Office Visit Vasyl Camp MD [facility]   Showing recent visits within past 540 days with a meds authorizing provider and meeting all other requirements  Future Appointments  No visits were found meeting these conditions.  Showing future appointments within next 150 days with a meds authorizing provider and meeting all other requirements     3/18/2025     Requested Prescriptions     Pending Prescriptions Disp Refills    sildenafil (VIAGRA) 100 MG tablet 10 tablet 1     Sig: Take 1 tablet by mouth as needed for Erectile Dysfunction for erectile dysfunction

## 2025-06-05 ENCOUNTER — TELEPHONE (OUTPATIENT)
Dept: INTERNAL MEDICINE CLINIC | Age: 55
End: 2025-06-05

## 2025-06-05 NOTE — TELEPHONE ENCOUNTER
Patient called. He called wanting his prostate checked as well as his liver and kidneys. Offered an afternoon opening with Dr. Pinon at [facility] and he said he can only do mornings and he only wanted 06.12.2025. Then I offered a morning with Dr. Alejandro in [facility] and said that it was too far. I got him scheduled with Dr. Villasenor and he agreed.

## 2025-06-12 ENCOUNTER — OFFICE VISIT (OUTPATIENT)
Dept: FAMILY MEDICINE CLINIC | Age: 55
End: 2025-06-12

## 2025-06-12 VITALS
SYSTOLIC BLOOD PRESSURE: 106 MMHG | BODY MASS INDEX: 28.56 KG/M2 | HEIGHT: 71 IN | HEART RATE: 84 BPM | OXYGEN SATURATION: 98 % | WEIGHT: 204 LBS | TEMPERATURE: 98.3 F | DIASTOLIC BLOOD PRESSURE: 72 MMHG

## 2025-06-12 DIAGNOSIS — Z12.5 SCREENING FOR PROSTATE CANCER: Primary | ICD-10-CM

## 2025-06-12 DIAGNOSIS — E78.5 DYSLIPIDEMIA ASSOCIATED WITH TYPE 2 DIABETES MELLITUS (HCC): ICD-10-CM

## 2025-06-12 DIAGNOSIS — E11.69 DYSLIPIDEMIA ASSOCIATED WITH TYPE 2 DIABETES MELLITUS (HCC): ICD-10-CM

## 2025-06-12 DIAGNOSIS — E55.9 VITAMIN D DEFICIENCY: ICD-10-CM

## 2025-06-12 LAB — HBA1C MFR BLD: 9 %

## 2025-06-12 ASSESSMENT — ENCOUNTER SYMPTOMS
EYE PAIN: 0
WHEEZING: 0
SINUS PRESSURE: 0
SORE THROAT: 0
SHORTNESS OF BREATH: 0
RHINORRHEA: 0
EYE ITCHING: 0
EYES NEGATIVE: 1
ABDOMINAL PAIN: 0
COUGH: 0

## 2025-06-12 NOTE — PROGRESS NOTES
recognition software.  Occasionally, words are mistranscribed and despite editing, the text may contain inaccuracies due to incorrect word recognition.  If further clarification is needed please contact the office at [phone]

## 2025-06-16 ENCOUNTER — TELEPHONE (OUTPATIENT)
Dept: INTERNAL MEDICINE CLINIC | Age: 55
End: 2025-06-16

## 2025-06-16 DIAGNOSIS — Z12.5 SCREENING FOR PROSTATE CANCER: ICD-10-CM

## 2025-06-16 DIAGNOSIS — E55.9 VITAMIN D DEFICIENCY: ICD-10-CM

## 2025-06-16 LAB — PSA SERPL DL<=0.01 NG/ML-MCNC: 1.5 NG/ML (ref 0–4)

## 2025-06-16 NOTE — TELEPHONE ENCOUNTER
The paperwork labeled  [facility] Certification requires Viji Flynn DO's signature. It has been scanned, put in a yellow folder and in PCP's bin.

## 2025-06-17 LAB — 25(OH)D3 SERPL-MCNC: 12.6 NG/ML

## 2025-07-03 ENCOUNTER — OFFICE VISIT (OUTPATIENT)
Dept: FAMILY MEDICINE CLINIC | Age: 55
End: 2025-07-03

## 2025-07-03 VITALS
DIASTOLIC BLOOD PRESSURE: 84 MMHG | BODY MASS INDEX: 29.79 KG/M2 | HEART RATE: 83 BPM | OXYGEN SATURATION: 95 % | TEMPERATURE: 97.2 F | SYSTOLIC BLOOD PRESSURE: 124 MMHG | HEIGHT: 71 IN | WEIGHT: 212.8 LBS

## 2025-07-03 DIAGNOSIS — Z79.4 TYPE 2 DIABETES MELLITUS WITHOUT COMPLICATION, WITH LONG-TERM CURRENT USE OF INSULIN (HCC): ICD-10-CM

## 2025-07-03 DIAGNOSIS — E11.9 TYPE 2 DIABETES MELLITUS WITHOUT COMPLICATION, WITH LONG-TERM CURRENT USE OF INSULIN (HCC): ICD-10-CM

## 2025-07-03 DIAGNOSIS — E55.9 VITAMIN D DEFICIENCY: Primary | ICD-10-CM

## 2025-07-03 RX ORDER — ERGOCALCIFEROL 1.25 MG/1
50000 CAPSULE, LIQUID FILLED ORAL WEEKLY
Qty: 12 CAPSULE | Refills: 0 | Status: SHIPPED | OUTPATIENT
Start: 2025-07-03

## 2025-07-03 ASSESSMENT — ENCOUNTER SYMPTOMS
SINUS PRESSURE: 0
SHORTNESS OF BREATH: 0
EYES NEGATIVE: 1
ABDOMINAL PAIN: 0
COUGH: 0
EYE ITCHING: 0
SORE THROAT: 0
WHEEZING: 0
EYE PAIN: 0
RHINORRHEA: 0

## 2025-07-04 NOTE — PROGRESS NOTES
Seth Villavicencio (:  1970) is a 55 y.o. male,Established patient, here for evaluation of the following chief complaint(s):  Follow-up          Subjective   SUBJECTIVE/OBJECTIVE:  HPI  55 yr old male here to discuss test results  Hemoglobin A1C   Date Value Ref Range Status   2025 9.0 % Final     Pt on tuojeo 35 ubits, ot unable to tolerated GLP-1 due gastric side effects.    Lab Results   Component Value Date/Time    VITD25 12.6 2025 09:58 AM       Review of Systems   Constitutional: Negative.  Negative for fatigue.   HENT:  Negative for congestion, ear pain, rhinorrhea, sinus pressure and sore throat.    Eyes: Negative.  Negative for pain and itching.   Respiratory:  Negative for cough, shortness of breath and wheezing.    Cardiovascular:  Negative for chest pain and palpitations.   Gastrointestinal:  Negative for abdominal pain.   Genitourinary:  Negative for frequency and urgency.   Musculoskeletal:  Negative for gait problem.   Skin:  Negative for rash.   Neurological:  Negative for dizziness and headaches.   Psychiatric/Behavioral:  The patient is not nervous/anxious.           Objective   Physical Exam  Constitutional:       Appearance: Normal appearance.   HENT:      Head: Normocephalic and atraumatic.   Cardiovascular:      Rate and Rhythm: Normal rate and regular rhythm.   Pulmonary:      Effort: Pulmonary effort is normal.      Breath sounds: Normal breath sounds. No wheezing.   Neurological:      Mental Status: He is alert.   Psychiatric:         Mood and Affect: Mood normal.                  Assessment & Plan   ASSESSMENT/PLAN:  1. Vitamin D deficiency  -     vitamin D (ERGOCALCIFEROL) 1.25 MG (51601 UT) CAPS capsule; Take 1 capsule by mouth once a week, Disp-12 capsule, R-0Normal  2. Type 2 diabetes mellitus without complication, with long-term current use of insulin (HCC)    Increase tuojeo to 40 units  F/u 6 weeks with cbg log             An electronic signature was used to

## 2025-08-31 DIAGNOSIS — E11.69 DYSLIPIDEMIA ASSOCIATED WITH TYPE 2 DIABETES MELLITUS (HCC): ICD-10-CM

## 2025-08-31 DIAGNOSIS — I10 ESSENTIAL HYPERTENSION: ICD-10-CM

## 2025-08-31 DIAGNOSIS — E11.65 POORLY CONTROLLED TYPE 2 DIABETES MELLITUS WITH NEUROPATHY (HCC): ICD-10-CM

## 2025-08-31 DIAGNOSIS — E11.9 TYPE 2 DIABETES MELLITUS WITHOUT COMPLICATION, WITHOUT LONG-TERM CURRENT USE OF INSULIN (HCC): ICD-10-CM

## 2025-08-31 DIAGNOSIS — E11.40 POORLY CONTROLLED TYPE 2 DIABETES MELLITUS WITH NEUROPATHY (HCC): ICD-10-CM

## 2025-08-31 DIAGNOSIS — E78.5 DYSLIPIDEMIA ASSOCIATED WITH TYPE 2 DIABETES MELLITUS (HCC): ICD-10-CM

## 2025-08-31 DIAGNOSIS — E66.01 MORBID OBESITY DUE TO EXCESS CALORIES (HCC): ICD-10-CM

## 2025-09-04 DIAGNOSIS — E11.65 POORLY CONTROLLED TYPE 2 DIABETES MELLITUS WITH NEUROPATHY (HCC): ICD-10-CM

## 2025-09-04 DIAGNOSIS — E11.40 POORLY CONTROLLED TYPE 2 DIABETES MELLITUS WITH NEUROPATHY (HCC): ICD-10-CM

## 2025-09-04 RX ORDER — INSULIN GLARGINE 300 U/ML
INJECTION, SOLUTION SUBCUTANEOUS
Qty: 13.5 ML | Refills: 3 | Status: SHIPPED | OUTPATIENT
Start: 2025-09-04